# Patient Record
Sex: FEMALE | Race: WHITE | NOT HISPANIC OR LATINO | Employment: FULL TIME | URBAN - METROPOLITAN AREA
[De-identification: names, ages, dates, MRNs, and addresses within clinical notes are randomized per-mention and may not be internally consistent; named-entity substitution may affect disease eponyms.]

---

## 2017-01-09 ENCOUNTER — TELEPHONE (OUTPATIENT)
Dept: RADIOLOGY | Facility: MEDICAL CENTER | Age: 57
End: 2017-01-09

## 2017-01-18 DIAGNOSIS — G43.001 MIGRAINE WITHOUT AURA AND WITH STATUS MIGRAINOSUS, NOT INTRACTABLE: ICD-10-CM

## 2017-01-18 RX ORDER — TOPIRAMATE 25 MG/1
25 TABLET ORAL 2 TIMES DAILY
Qty: 120 TAB | Refills: 5 | Status: SHIPPED | OUTPATIENT
Start: 2017-01-18 | End: 2017-02-21 | Stop reason: SDUPTHER

## 2017-01-18 RX ORDER — TOPIRAMATE SPINKLE 25 MG/1
25 CAPSULE ORAL 2 TIMES DAILY
Qty: 120 CAP | Refills: 0 | Status: SHIPPED | OUTPATIENT
Start: 2017-01-18 | End: 2017-04-13 | Stop reason: RX

## 2017-02-21 DIAGNOSIS — G43.001 MIGRAINE WITHOUT AURA AND WITH STATUS MIGRAINOSUS, NOT INTRACTABLE: ICD-10-CM

## 2017-02-21 NOTE — TELEPHONE ENCOUNTER
Please clarify this before we send it, she needs refills and wants to know about three tabs is ok, she is still having headaches.

## 2017-02-22 RX ORDER — TOPIRAMATE 25 MG/1
25 TABLET ORAL 2 TIMES DAILY
Qty: 120 TAB | Refills: 0 | Status: SHIPPED | OUTPATIENT
Start: 2017-02-22 | End: 2017-04-13 | Stop reason: RX

## 2017-03-29 ENCOUNTER — OFFICE VISIT (OUTPATIENT)
Dept: URGENT CARE | Facility: CLINIC | Age: 57
End: 2017-03-29
Payer: COMMERCIAL

## 2017-03-29 VITALS
SYSTOLIC BLOOD PRESSURE: 134 MMHG | DIASTOLIC BLOOD PRESSURE: 90 MMHG | WEIGHT: 180 LBS | OXYGEN SATURATION: 99 % | HEART RATE: 71 BPM | HEIGHT: 66 IN | RESPIRATION RATE: 16 BRPM | BODY MASS INDEX: 28.93 KG/M2 | TEMPERATURE: 97.9 F

## 2017-03-29 DIAGNOSIS — G43.001 MIGRAINE WITHOUT AURA AND WITH STATUS MIGRAINOSUS, NOT INTRACTABLE: ICD-10-CM

## 2017-03-29 PROCEDURE — 99214 OFFICE O/P EST MOD 30 MIN: CPT | Performed by: FAMILY MEDICINE

## 2017-03-29 RX ORDER — KETOROLAC TROMETHAMINE 30 MG/ML
60 INJECTION, SOLUTION INTRAMUSCULAR; INTRAVENOUS ONCE
Status: COMPLETED | OUTPATIENT
Start: 2017-03-29 | End: 2017-03-29

## 2017-03-29 RX ORDER — DEXAMETHASONE SODIUM PHOSPHATE 10 MG/ML
10 INJECTION INTRAMUSCULAR; INTRAVENOUS ONCE
Status: COMPLETED | OUTPATIENT
Start: 2017-03-29 | End: 2017-03-29

## 2017-03-29 RX ORDER — BUTALBITAL, ACETAMINOPHEN AND CAFFEINE 50; 325; 40 MG/1; MG/1; MG/1
1 TABLET ORAL EVERY 4 HOURS PRN
Qty: 15 TAB | Refills: 0 | Status: ON HOLD | OUTPATIENT
Start: 2017-03-29 | End: 2017-09-18

## 2017-03-29 RX ADMIN — DEXAMETHASONE SODIUM PHOSPHATE 10 MG: 10 INJECTION INTRAMUSCULAR; INTRAVENOUS at 17:01

## 2017-03-29 RX ADMIN — KETOROLAC TROMETHAMINE 60 MG: 30 INJECTION, SOLUTION INTRAMUSCULAR; INTRAVENOUS at 16:58

## 2017-03-29 NOTE — MR AVS SNAPSHOT
"        Sharlene Garcia   3/29/2017 4:15 PM   Office Visit   MRN: [id number]    Department:  [facility]   Dept Phone:  [phone]    Description:  Female : 1960   Provider:  Rd Aponte M.D.           Reason for Visit     Sinus Problem pressure pain, mostly on left side, teeth hurt, eye on left pain pressure, hard to sleep at night, x5days      Allergies as of 3/29/2017     Allergen Noted Reactions    Nkda [No Known Drug Allergy] 2010       Other Environmental 2011         You were diagnosed with     Migraine without aura and with status migrainosus, not intractable   [[id number]]         Vital Signs     Blood Pressure Pulse Temperature Respirations Height Weight    134/90 mmHg 71 36.6 °C (97.9 °F) 16 1.676 m (5' 6\") 81.647 kg (180 lb)    Body Mass Index Oxygen Saturation Smoking Status             29.07 kg/m2 99% Never Smoker          Basic Information     Date Of Birth Sex Race Ethnicity Preferred Language    1960 Female White Non- English      Problem List              ICD-10-CM Priority Class Noted - Resolved    Allergic rhinitis    10/5/2010 - Present    Encounter for routine gynecological examination Z01.419   10/5/2010 - Present    Hyperlipidemia with target LDL less than 160 E78.5   10/28/2010 - Present    Elevated liver enzymes R74.8   10/28/2010 - Present    Menopausal and perimenopausal disorder N95.9   Unknown - Present    Encounter for routine gynecological examination Z01.419   10/10/2011 - Present    S/P colonoscopy Z98.890   10/10/2011 - Present    Calculus of gallbladder and bile duct with cholecystitis K80.60   10/27/2011 - Present    Migraine G43.909   10/24/2015 - Present    Cerebral aneurysm without rupture I67.1   10/25/2015 - Present      Health Maintenance        Date Due Completion Dates    IMM DTaP/Tdap/Td Vaccine (1 - Tdap) 1979 ---    PAP SMEAR 1981 ---    COLONOSCOPY 2010 ---    MAMMOGRAM 4/3/2014 4/3/2013, 3/21/2012, 3/18/2011, " 3/9/2010, 3/9/2010    IMM INFLUENZA (1) 9/1/2016 10/17/2015, 10/16/2015            Current Immunizations     Influenza Vaccine Adult HD 10/16/2015    Influenza Vaccine Quad Inj (Pf) 10/17/2015 10:37 AM    Pneumococcal polysaccharide vaccine (PPSV-23) 10/25/2012      Below and/or attached are the medications your provider expects you to take. Review all of your home medications and newly ordered medications with your provider and/or pharmacist. Follow medication instructions as directed by your provider and/or pharmacist. Please keep your medication list with you and share with your provider. Update the information when medications are discontinued, doses are changed, or new medications (including over-the-counter products) are added; and carry medication information at all times in the event of emergency situations     Allergies:  NKDA - (reactions not documented)     OTHER ENVIRONMENTAL - (reactions not documented)               Medications  Valid as of: March 29, 2017 -  5:18 PM    Generic Name Brand Name Tablet Size Instructions for use    ALPRAZolam (Tab) XANAX 0.25 MG Take 1 Tab by mouth at bedtime as needed for Sleep.        ALPRAZolam (Tab) XANAX 1 MG Take 1 Tab by mouth at bedtime as needed for Sleep. 1 Tab prior to MRI. May repeat x 1        ALPRAZolam (Tab) XANAX 1 MG Take 1 Tab by mouth at bedtime as needed for Sleep. 1 tab prior to MRI. May repeat x 1        Beclomethasone Dipropionate (Aero Soln) QVAR 80 MCG/ACT Inhale 1 Puff by mouth 2 times a day.        Butalbital-APAP-Caffeine (Tab) FIORICET -40 MG Take 1 Tab by mouth every four hours as needed for Headache.        Cetirizine HCl (Tab) ZYRTEC 10 MG Take 10 mg by mouth every day.        Coenzyme Q10-Fish Oil-Vit E   Take  by mouth.        Diphenhydramine-APAP (sleep)   Take  by mouth.        Ginkgo Biloba (Extract) Ginkgo Biloba  by Does not apply route.        hydrocodone/APAP 7.5/325 mg (NORCO) 7-325 Tab (Tab) NORCO 7-325 Take 1-2 Tabs by  mouth every four hours as needed.        Ibuprofen (Cap) Ibuprofen 200 MG Take  by mouth.        Iron   Take  by mouth.        Oxycodone-Acetaminophen (Tab) PERCOCET-10  MG         Promethazine HCl (Tab) PHENERGAN 25 MG Take 1 Tab by mouth every 6 hours as needed for Nausea/Vomiting.        SUMAtriptan Succinate (Tab) IMITREX 100 MG Take 1 Tab by mouth Once PRN for Migraine for up to 1 dose.        Topiramate (Tab) TOPAMAX 25 MG Take 1 Tab by mouth 2 times a day.        Topiramate (CAPSULE SPRINKLE) TOPAMAX 25 MG Take 1 Cap by mouth 2 times a day. 2 tabs AM and 2 tabs PM        Topiramate (Tab) TOPAMAX 25 MG Take 1 Tab by mouth 2 times a day. 2  25 mg. Tabs BID        .                 Medicines prescribed today were sent to:     [facility][id number] - [place], [place] - [address]    [address] [place] [address]    Phone: [phone] Fax: [phone]    Open 24 Hours?: No      Medication refill instructions:       If your prescription bottle indicates you have medication refills left, it is not necessary to call your provider’s office. Please contact your pharmacy and they will refill your medication.    If your prescription bottle indicates you do not have any refills left, you may request refills at any time through one of the following ways: The online [facility] system (except Urgent Care), by calling your provider’s office, or by asking your pharmacy to contact your provider’s office with a refill request. Medication refills are processed only during regular business hours and may not be available until the next business day. Your provider may request additional information or to have a follow-up visit with you prior to refilling your medication.   *Please Note: Medication refills are assigned a new Rx number when refilled electronically. Your pharmacy may indicate that no refills were authorized even though a new prescription for the same medication is available at the pharmacy. Please request the  medicine by name with the pharmacy before contacting your provider for a refill.           MyChart Access Code: Activation code not generated  Current MyChart Status: Active

## 2017-03-29 NOTE — PROGRESS NOTES
Chief Complaint   Patient presents with   •  headache     pressure pain, mostly on left side, teeth hurt, eye on left pain pressure, hard to sleep at night, x5days         Migraine   This is a new problem. The current episode started in the past 7 days. The problem occurs intermittently. The problem has been unchanged. The pain is located in the retro-orbital and left unilateral region. The pain does not radiate. The pain quality is similar to prior headaches. The quality of the pain is described as sharp. Associated symptoms include dizziness, nausea, phonophobia and photophobia. Pertinent negatives include no abdominal pain or fever. The symptoms are aggravated by activity and bright light. Patient has tried acetaminophen for the symptoms. The treatment provided no relief.  past medical history is significant for migraine headaches.       Social History   Substance Use Topics   • Smoking status: Never Smoker    • Smokeless tobacco: Never Used   • Alcohol Use: 0.0 - 0.5 oz/week     0-1 Glasses of wine per week      Comment: once a week           Past Medical History   Diagnosis Date   • ASTHMA    • Allergy    • Anxiety      Due to financial issues, never took medication.   • GERD (gastroesophageal reflux disease) 2005     Doing better now with weight loss 6/10.   • Chronic bronchitis      Including multiple episodes of pnemonia, until 2001   • Headache(784.0)    • Migraine      Rare, many years ago.   • Hyperlipidemia LDL goal < 160 10/28/2010   • Menopausal and perimenopausal disorder 2005           Review of Systems   Constitutional: Negative for fever and chills.   Eyes: Positive for photophobia.   Respiratory: Negative for shortness of breath.    Cardiovascular: Negative for chest pain and palpitations.   Gastrointestinal: Positive for nausea. Negative for abdominal pain.   Skin: Negative for rash.   Neurological: Positive for dizziness and headaches.   Psychiatric/Behavioral: The patient is not  "nervous/anxious.    All other systems reviewed and are negative.         Objective:     Blood pressure 134/90, pulse 71, temperature 36.6 °C (97.9 °F), resp. rate 16, height 1.676 m (5' 6\"), weight 81.647 kg (180 lb), SpO2 99 %.    Physical Exam   Constitutional: pt is oriented to person, place, and time.  Pt appears well-developed and well-nourished. No distress.   HENT:   Head: Normocephalic and atraumatic.   Mouth/Throat: Oropharynx is clear and moist. No oropharyngeal exudate.   Eyes: Conjunctivae and EOM are normal. Pupils are equal, round, and reactive to light. Right eye exhibits no discharge. Left eye exhibits no discharge. No scleral icterus.   Neck: Neck supple.   Cardiovascular: Normal rate and regular rhythm.    Pulmonary/Chest: Effort normal.   Lymphadenopathy:     Pt has no cervical adenopathy.   Neurological: pt is alert and oriented to person, place, and time. No cranial nerve deficit. Coordination normal.   Skin: Skin is warm. Pt is not diaphoretic. No erythema. No pallor.   Psychiatric:  behavior is normal.   Nursing note and vitals reviewed.              Assessment/Plan:     1. Migraine without aura and with status migrainosus, not intractable  Subjective improvement after toradol, decadron injection    - ketorolac (TORADOL) injection 60 mg; 2 mL by Intramuscular route Once.  - dexamethasone (DECADRON) injection (check route below) 10 mg; 1 mL by Intramuscular route Once.  - acetaminophen/caffeine/butalbital 325-40-50 mg (FIORICET) -40 MG Tab; Take 1 Tab by mouth every four hours as needed for Headache.  Dispense: 15 Tab; Refill: 0        "

## 2017-04-13 DIAGNOSIS — G43.109 MIGRAINE WITH AURA AND WITHOUT STATUS MIGRAINOSUS, NOT INTRACTABLE: ICD-10-CM

## 2017-04-13 RX ORDER — TOPIRAMATE 25 MG/1
TABLET ORAL
Qty: 120 TAB | Refills: 5 | Status: ON HOLD | OUTPATIENT
Start: 2017-04-13 | End: 2017-09-18

## 2017-06-09 DIAGNOSIS — G43.001 MIGRAINE WITHOUT AURA AND WITH STATUS MIGRAINOSUS, NOT INTRACTABLE: ICD-10-CM

## 2017-06-09 NOTE — TELEPHONE ENCOUNTER
Pt requesting a refill on Alprazolam, rx was last written for on 12/21/16 with 2 additional refills. Pt was seen on 12/1/16, and does not have a f/v scheduled.

## 2017-06-14 DIAGNOSIS — G43.001 MIGRAINE WITHOUT AURA AND WITH STATUS MIGRAINOSUS, NOT INTRACTABLE: ICD-10-CM

## 2017-06-14 RX ORDER — ALPRAZOLAM 0.25 MG/1
0.25 TABLET ORAL NIGHTLY PRN
Qty: 30 TAB | Refills: 2 | Status: ON HOLD | OUTPATIENT
Start: 2017-06-14 | End: 2017-09-19

## 2017-06-14 RX ORDER — ALPRAZOLAM 0.25 MG/1
0.25 TABLET ORAL NIGHTLY PRN
Qty: 30 TAB | Refills: 5 | Status: ON HOLD | OUTPATIENT
Start: 2017-06-14 | End: 2017-09-18

## 2017-09-17 ENCOUNTER — APPOINTMENT (OUTPATIENT)
Dept: RADIOLOGY | Facility: MEDICAL CENTER | Age: 57
End: 2017-09-17
Attending: EMERGENCY MEDICINE
Payer: COMMERCIAL

## 2017-09-17 ENCOUNTER — RESOLUTE PROFESSIONAL BILLING HOSPITAL PROF FEE (OUTPATIENT)
Dept: HOSPITALIST | Facility: MEDICAL CENTER | Age: 57
End: 2017-09-17
Payer: COMMERCIAL

## 2017-09-17 ENCOUNTER — HOSPITAL ENCOUNTER (OUTPATIENT)
Facility: MEDICAL CENTER | Age: 57
End: 2017-09-19
Attending: EMERGENCY MEDICINE | Admitting: HOSPITALIST
Payer: COMMERCIAL

## 2017-09-17 DIAGNOSIS — R51.9 ACUTE INTRACTABLE HEADACHE, UNSPECIFIED HEADACHE TYPE: ICD-10-CM

## 2017-09-17 DIAGNOSIS — G43.001 MIGRAINE WITHOUT AURA AND WITH STATUS MIGRAINOSUS, NOT INTRACTABLE: ICD-10-CM

## 2017-09-17 PROBLEM — E87.6 HYPOKALEMIA: Status: ACTIVE | Noted: 2017-09-17

## 2017-09-17 PROBLEM — D72.819 LEUKOPENIA: Status: ACTIVE | Noted: 2017-09-17

## 2017-09-17 PROBLEM — G43.909 MIGRAINE HEADACHE: Status: ACTIVE | Noted: 2017-09-17

## 2017-09-17 LAB
ALBUMIN SERPL BCP-MCNC: 4.3 G/DL (ref 3.2–4.9)
ALBUMIN/GLOB SERPL: 1.8 G/DL
ALP SERPL-CCNC: 96 U/L (ref 30–99)
ALT SERPL-CCNC: 124 U/L (ref 2–50)
ANION GAP SERPL CALC-SCNC: 9 MMOL/L (ref 0–11.9)
AST SERPL-CCNC: 76 U/L (ref 12–45)
BASOPHILS # BLD AUTO: 1.4 % (ref 0–1.8)
BASOPHILS # BLD: 0.05 K/UL (ref 0–0.12)
BILIRUB SERPL-MCNC: 0.6 MG/DL (ref 0.1–1.5)
BUN SERPL-MCNC: 10 MG/DL (ref 8–22)
CALCIUM SERPL-MCNC: 8.8 MG/DL (ref 8.4–10.2)
CHLORIDE SERPL-SCNC: 109 MMOL/L (ref 96–112)
CO2 SERPL-SCNC: 19 MMOL/L (ref 20–33)
CREAT SERPL-MCNC: 0.83 MG/DL (ref 0.5–1.4)
EOSINOPHIL # BLD AUTO: 0.07 K/UL (ref 0–0.51)
EOSINOPHIL NFR BLD: 2 % (ref 0–6.9)
ERYTHROCYTE [DISTWIDTH] IN BLOOD BY AUTOMATED COUNT: 45.1 FL (ref 35.9–50)
GFR SERPL CREATININE-BSD FRML MDRD: >60 ML/MIN/1.73 M 2
GLOBULIN SER CALC-MCNC: 2.4 G/DL (ref 1.9–3.5)
GLUCOSE SERPL-MCNC: 96 MG/DL (ref 65–99)
HCT VFR BLD AUTO: 38.7 % (ref 37–47)
HGB BLD-MCNC: 13.3 G/DL (ref 12–16)
IMM GRANULOCYTES # BLD AUTO: 0.01 K/UL (ref 0–0.11)
IMM GRANULOCYTES NFR BLD AUTO: 0.3 % (ref 0–0.9)
LYMPHOCYTES # BLD AUTO: 1.57 K/UL (ref 1–4.8)
LYMPHOCYTES NFR BLD: 44.1 % (ref 22–41)
MCH RBC QN AUTO: 30.7 PG (ref 27–33)
MCHC RBC AUTO-ENTMCNC: 34.4 G/DL (ref 33.6–35)
MCV RBC AUTO: 89.4 FL (ref 81.4–97.8)
MONOCYTES # BLD AUTO: 0.34 K/UL (ref 0–0.85)
MONOCYTES NFR BLD AUTO: 9.6 % (ref 0–13.4)
NEUTROPHILS # BLD AUTO: 1.52 K/UL (ref 2–7.15)
NEUTROPHILS NFR BLD: 42.6 % (ref 44–72)
NRBC # BLD AUTO: 0 K/UL
NRBC BLD AUTO-RTO: 0 /100 WBC
PLATELET # BLD AUTO: 257 K/UL (ref 164–446)
PMV BLD AUTO: 11.4 FL (ref 9–12.9)
POTASSIUM SERPL-SCNC: 3.5 MMOL/L (ref 3.6–5.5)
PROT SERPL-MCNC: 6.7 G/DL (ref 6–8.2)
RBC # BLD AUTO: 4.33 M/UL (ref 4.2–5.4)
SODIUM SERPL-SCNC: 137 MMOL/L (ref 135–145)
WBC # BLD AUTO: 3.6 K/UL (ref 4.8–10.8)

## 2017-09-17 PROCEDURE — 700105 HCHG RX REV CODE 258: Performed by: EMERGENCY MEDICINE

## 2017-09-17 PROCEDURE — G0378 HOSPITAL OBSERVATION PER HR: HCPCS

## 2017-09-17 PROCEDURE — 99285 EMERGENCY DEPT VISIT HI MDM: CPT

## 2017-09-17 PROCEDURE — 96374 THER/PROPH/DIAG INJ IV PUSH: CPT

## 2017-09-17 PROCEDURE — 700102 HCHG RX REV CODE 250 W/ 637 OVERRIDE(OP): Performed by: HOSPITALIST

## 2017-09-17 PROCEDURE — 96375 TX/PRO/DX INJ NEW DRUG ADDON: CPT

## 2017-09-17 PROCEDURE — 70450 CT HEAD/BRAIN W/O DYE: CPT

## 2017-09-17 PROCEDURE — 700111 HCHG RX REV CODE 636 W/ 250 OVERRIDE (IP): Performed by: EMERGENCY MEDICINE

## 2017-09-17 PROCEDURE — 36415 COLL VENOUS BLD VENIPUNCTURE: CPT

## 2017-09-17 PROCEDURE — 700105 HCHG RX REV CODE 258: Performed by: HOSPITALIST

## 2017-09-17 PROCEDURE — 94760 N-INVAS EAR/PLS OXIMETRY 1: CPT

## 2017-09-17 PROCEDURE — A9270 NON-COVERED ITEM OR SERVICE: HCPCS | Performed by: HOSPITALIST

## 2017-09-17 PROCEDURE — 99220 PR INITIAL OBSERVATION CARE,LEVL III: CPT | Performed by: HOSPITALIST

## 2017-09-17 PROCEDURE — 85025 COMPLETE CBC W/AUTO DIFF WBC: CPT

## 2017-09-17 PROCEDURE — 80053 COMPREHEN METABOLIC PANEL: CPT

## 2017-09-17 RX ORDER — PROMETHAZINE HYDROCHLORIDE 25 MG/1
12.5-25 SUPPOSITORY RECTAL EVERY 4 HOURS PRN
Status: DISCONTINUED | OUTPATIENT
Start: 2017-09-17 | End: 2017-09-19 | Stop reason: HOSPADM

## 2017-09-17 RX ORDER — POTASSIUM CHLORIDE 20 MEQ/1
20 TABLET, EXTENDED RELEASE ORAL DAILY
Status: DISCONTINUED | OUTPATIENT
Start: 2017-09-18 | End: 2017-09-19 | Stop reason: HOSPADM

## 2017-09-17 RX ORDER — POLYETHYLENE GLYCOL 3350 17 G/17G
1 POWDER, FOR SOLUTION ORAL
Status: DISCONTINUED | OUTPATIENT
Start: 2017-09-17 | End: 2017-09-19 | Stop reason: HOSPADM

## 2017-09-17 RX ORDER — BUTALBITAL, ACETAMINOPHEN AND CAFFEINE 50; 325; 40 MG/1; MG/1; MG/1
1 TABLET ORAL EVERY 4 HOURS PRN
Status: DISCONTINUED | OUTPATIENT
Start: 2017-09-17 | End: 2017-09-19 | Stop reason: HOSPADM

## 2017-09-17 RX ORDER — KETOROLAC TROMETHAMINE 30 MG/ML
30 INJECTION, SOLUTION INTRAMUSCULAR; INTRAVENOUS ONCE
Status: COMPLETED | OUTPATIENT
Start: 2017-09-17 | End: 2017-09-17

## 2017-09-17 RX ORDER — DIPHENHYDRAMINE HCL 25 MG
25 TABLET ORAL
Status: DISCONTINUED | OUTPATIENT
Start: 2017-09-17 | End: 2017-09-19 | Stop reason: HOSPADM

## 2017-09-17 RX ORDER — ALPRAZOLAM 0.5 MG/1
1 TABLET ORAL NIGHTLY PRN
Status: DISCONTINUED | OUTPATIENT
Start: 2017-09-17 | End: 2017-09-19 | Stop reason: HOSPADM

## 2017-09-17 RX ORDER — DIPHENHYDRAMINE HYDROCHLORIDE 50 MG/ML
25 INJECTION INTRAMUSCULAR; INTRAVENOUS ONCE
Status: COMPLETED | OUTPATIENT
Start: 2017-09-17 | End: 2017-09-17

## 2017-09-17 RX ORDER — ONDANSETRON 4 MG/1
4 TABLET, ORALLY DISINTEGRATING ORAL EVERY 4 HOURS PRN
Status: DISCONTINUED | OUTPATIENT
Start: 2017-09-17 | End: 2017-09-19 | Stop reason: HOSPADM

## 2017-09-17 RX ORDER — ATORVASTATIN CALCIUM 10 MG/1
20 TABLET, FILM COATED ORAL
Status: DISCONTINUED | OUTPATIENT
Start: 2017-09-17 | End: 2017-09-19 | Stop reason: HOSPADM

## 2017-09-17 RX ORDER — PROMETHAZINE HYDROCHLORIDE 25 MG/1
12.5-25 TABLET ORAL EVERY 4 HOURS PRN
Status: DISCONTINUED | OUTPATIENT
Start: 2017-09-17 | End: 2017-09-19 | Stop reason: HOSPADM

## 2017-09-17 RX ORDER — ONDANSETRON 2 MG/ML
4 INJECTION INTRAMUSCULAR; INTRAVENOUS EVERY 4 HOURS PRN
Status: DISCONTINUED | OUTPATIENT
Start: 2017-09-17 | End: 2017-09-19 | Stop reason: HOSPADM

## 2017-09-17 RX ORDER — SODIUM CHLORIDE 9 MG/ML
1000 INJECTION, SOLUTION INTRAVENOUS ONCE
Status: COMPLETED | OUTPATIENT
Start: 2017-09-17 | End: 2017-09-17

## 2017-09-17 RX ORDER — DIPHENHYDRAMINE HYDROCHLORIDE 50 MG/ML
12.5 INJECTION INTRAMUSCULAR; INTRAVENOUS ONCE
Status: DISCONTINUED | OUTPATIENT
Start: 2017-09-17 | End: 2017-09-17

## 2017-09-17 RX ORDER — AMOXICILLIN 250 MG
2 CAPSULE ORAL 2 TIMES DAILY
Status: DISCONTINUED | OUTPATIENT
Start: 2017-09-17 | End: 2017-09-19 | Stop reason: HOSPADM

## 2017-09-17 RX ORDER — LABETALOL HYDROCHLORIDE 5 MG/ML
10 INJECTION, SOLUTION INTRAVENOUS EVERY 4 HOURS PRN
Status: DISCONTINUED | OUTPATIENT
Start: 2017-09-17 | End: 2017-09-19 | Stop reason: HOSPADM

## 2017-09-17 RX ORDER — SODIUM CHLORIDE 9 MG/ML
INJECTION, SOLUTION INTRAVENOUS CONTINUOUS
Status: ACTIVE | OUTPATIENT
Start: 2017-09-17 | End: 2017-09-18

## 2017-09-17 RX ORDER — BISACODYL 10 MG
10 SUPPOSITORY, RECTAL RECTAL
Status: DISCONTINUED | OUTPATIENT
Start: 2017-09-17 | End: 2017-09-19 | Stop reason: HOSPADM

## 2017-09-17 RX ORDER — TOPIRAMATE 25 MG/1
25 TABLET ORAL 2 TIMES DAILY
Status: DISCONTINUED | OUTPATIENT
Start: 2017-09-17 | End: 2017-09-19

## 2017-09-17 RX ORDER — HYDROCODONE BITARTRATE AND ACETAMINOPHEN 7.5; 325 MG/1; MG/1
1-2 TABLET ORAL EVERY 4 HOURS PRN
Status: DISCONTINUED | OUTPATIENT
Start: 2017-09-17 | End: 2017-09-19 | Stop reason: HOSPADM

## 2017-09-17 RX ORDER — CETIRIZINE HYDROCHLORIDE 10 MG/1
10 TABLET ORAL DAILY
Status: DISCONTINUED | OUTPATIENT
Start: 2017-09-18 | End: 2017-09-19 | Stop reason: HOSPADM

## 2017-09-17 RX ORDER — ONDANSETRON 2 MG/ML
4 INJECTION INTRAMUSCULAR; INTRAVENOUS ONCE
Status: COMPLETED | OUTPATIENT
Start: 2017-09-17 | End: 2017-09-17

## 2017-09-17 RX ORDER — IBUPROFEN 200 MG
800 TABLET ORAL 3 TIMES DAILY PRN
Status: DISCONTINUED | OUTPATIENT
Start: 2017-09-17 | End: 2017-09-18

## 2017-09-17 RX ADMIN — TOPIRAMATE 25 MG: 25 TABLET, FILM COATED ORAL at 23:45

## 2017-09-17 RX ADMIN — KETOROLAC TROMETHAMINE 30 MG: 30 INJECTION, SOLUTION INTRAMUSCULAR at 18:52

## 2017-09-17 RX ADMIN — FENTANYL CITRATE 50 MCG: 50 INJECTION, SOLUTION INTRAMUSCULAR; INTRAVENOUS at 18:03

## 2017-09-17 RX ADMIN — ONDANSETRON 4 MG: 2 INJECTION INTRAMUSCULAR; INTRAVENOUS at 18:02

## 2017-09-17 RX ADMIN — DIPHENHYDRAMINE HYDROCHLORIDE 25 MG: 50 INJECTION, SOLUTION INTRAMUSCULAR; INTRAVENOUS at 19:38

## 2017-09-17 RX ADMIN — ATORVASTATIN CALCIUM 20 MG: 40 TABLET, FILM COATED ORAL at 23:46

## 2017-09-17 RX ADMIN — SODIUM CHLORIDE 1000 ML: 9 INJECTION, SOLUTION INTRAVENOUS at 18:03

## 2017-09-17 RX ADMIN — SODIUM CHLORIDE: 9 INJECTION, SOLUTION INTRAVENOUS at 23:44

## 2017-09-17 ASSESSMENT — COPD QUESTIONNAIRES
DO YOU EVER COUGH UP ANY MUCUS OR PHLEGM?: NO/ONLY WITH OCCASIONAL COLDS OR INFECTIONS
COPD SCREENING SCORE: 1
DURING THE PAST 4 WEEKS HOW MUCH DID YOU FEEL SHORT OF BREATH: NONE/LITTLE OF THE TIME
HAVE YOU SMOKED AT LEAST 100 CIGARETTES IN YOUR ENTIRE LIFE: NO/DON'T KNOW

## 2017-09-17 ASSESSMENT — PAIN SCALES - GENERAL
PAINLEVEL_OUTOF10: 7
PAINLEVEL_OUTOF10: 8
PAINLEVEL_OUTOF10: 6

## 2017-09-17 ASSESSMENT — LIFESTYLE VARIABLES
ALCOHOL_USE: NO
EVER_SMOKED: NEVER

## 2017-09-18 ENCOUNTER — APPOINTMENT (OUTPATIENT)
Dept: RADIOLOGY | Facility: MEDICAL CENTER | Age: 57
End: 2017-09-18
Attending: HOSPITALIST
Payer: COMMERCIAL

## 2017-09-18 LAB
ANION GAP SERPL CALC-SCNC: 4 MMOL/L (ref 0–11.9)
BUN SERPL-MCNC: 12 MG/DL (ref 8–22)
CALCIUM SERPL-MCNC: 7.9 MG/DL (ref 8.4–10.2)
CHLORIDE SERPL-SCNC: 114 MMOL/L (ref 96–112)
CO2 SERPL-SCNC: 21 MMOL/L (ref 20–33)
CREAT SERPL-MCNC: 0.77 MG/DL (ref 0.5–1.4)
ERYTHROCYTE [DISTWIDTH] IN BLOOD BY AUTOMATED COUNT: 48 FL (ref 35.9–50)
GFR SERPL CREATININE-BSD FRML MDRD: >60 ML/MIN/1.73 M 2
GLUCOSE SERPL-MCNC: 104 MG/DL (ref 65–99)
HCT VFR BLD AUTO: 36.4 % (ref 37–47)
HGB BLD-MCNC: 11.9 G/DL (ref 12–16)
MCH RBC QN AUTO: 30.3 PG (ref 27–33)
MCHC RBC AUTO-ENTMCNC: 32.7 G/DL (ref 33.6–35)
MCV RBC AUTO: 92.6 FL (ref 81.4–97.8)
PLATELET # BLD AUTO: 232 K/UL (ref 164–446)
PMV BLD AUTO: 11.6 FL (ref 9–12.9)
POTASSIUM SERPL-SCNC: 3.5 MMOL/L (ref 3.6–5.5)
RBC # BLD AUTO: 3.93 M/UL (ref 4.2–5.4)
SODIUM SERPL-SCNC: 139 MMOL/L (ref 135–145)
WBC # BLD AUTO: 3.2 K/UL (ref 4.8–10.8)

## 2017-09-18 PROCEDURE — 70553 MRI BRAIN STEM W/O & W/DYE: CPT

## 2017-09-18 PROCEDURE — 700111 HCHG RX REV CODE 636 W/ 250 OVERRIDE (IP): Performed by: HOSPITALIST

## 2017-09-18 PROCEDURE — 85027 COMPLETE CBC AUTOMATED: CPT

## 2017-09-18 PROCEDURE — 99226 PR SUBSEQUENT OBSERVATION CARE,LEVEL III: CPT | Performed by: INTERNAL MEDICINE

## 2017-09-18 PROCEDURE — A9579 GAD-BASE MR CONTRAST NOS,1ML: HCPCS | Performed by: INTERNAL MEDICINE

## 2017-09-18 PROCEDURE — 700111 HCHG RX REV CODE 636 W/ 250 OVERRIDE (IP): Performed by: INTERNAL MEDICINE

## 2017-09-18 PROCEDURE — 90471 IMMUNIZATION ADMIN: CPT

## 2017-09-18 PROCEDURE — G0378 HOSPITAL OBSERVATION PER HR: HCPCS

## 2017-09-18 PROCEDURE — 90686 IIV4 VACC NO PRSV 0.5 ML IM: CPT | Performed by: HOSPITALIST

## 2017-09-18 PROCEDURE — 80048 BASIC METABOLIC PNL TOTAL CA: CPT

## 2017-09-18 PROCEDURE — 700102 HCHG RX REV CODE 250 W/ 637 OVERRIDE(OP): Performed by: INTERNAL MEDICINE

## 2017-09-18 PROCEDURE — A9270 NON-COVERED ITEM OR SERVICE: HCPCS | Performed by: INTERNAL MEDICINE

## 2017-09-18 PROCEDURE — 700102 HCHG RX REV CODE 250 W/ 637 OVERRIDE(OP): Performed by: HOSPITALIST

## 2017-09-18 PROCEDURE — A9270 NON-COVERED ITEM OR SERVICE: HCPCS | Performed by: HOSPITALIST

## 2017-09-18 PROCEDURE — 700117 HCHG RX CONTRAST REV CODE 255: Performed by: INTERNAL MEDICINE

## 2017-09-18 RX ORDER — NAPROXEN 250 MG/1
500 TABLET ORAL 2 TIMES DAILY
Status: DISCONTINUED | OUTPATIENT
Start: 2017-09-18 | End: 2017-09-19 | Stop reason: HOSPADM

## 2017-09-18 RX ORDER — TOPIRAMATE 25 MG/1
25 TABLET ORAL 2 TIMES DAILY
Status: ON HOLD | COMMUNITY
End: 2017-09-19

## 2017-09-18 RX ORDER — FLUTICASONE PROPIONATE 50 MCG
2 SPRAY, SUSPENSION (ML) NASAL DAILY
Status: DISCONTINUED | OUTPATIENT
Start: 2017-09-18 | End: 2017-09-19 | Stop reason: HOSPADM

## 2017-09-18 RX ORDER — BENZONATATE 100 MG/1
100 CAPSULE ORAL 3 TIMES DAILY PRN
Status: DISCONTINUED | OUTPATIENT
Start: 2017-09-18 | End: 2017-09-19 | Stop reason: HOSPADM

## 2017-09-18 RX ORDER — ALPRAZOLAM 0.5 MG/1
2 TABLET ORAL
Status: COMPLETED | OUTPATIENT
Start: 2017-09-18 | End: 2017-09-18

## 2017-09-18 RX ORDER — SUMATRIPTAN 6 MG/.5ML
6 INJECTION, SOLUTION SUBCUTANEOUS ONCE
Status: COMPLETED | OUTPATIENT
Start: 2017-09-18 | End: 2017-09-18

## 2017-09-18 RX ADMIN — NAPROXEN 500 MG: 250 TABLET ORAL at 21:36

## 2017-09-18 RX ADMIN — BENZONATATE 100 MG: 100 CAPSULE ORAL at 13:18

## 2017-09-18 RX ADMIN — POTASSIUM CHLORIDE 20 MEQ: 1500 TABLET, EXTENDED RELEASE ORAL at 09:59

## 2017-09-18 RX ADMIN — CETIRIZINE HYDROCHLORIDE 10 MG: 10 TABLET, FILM COATED ORAL at 09:59

## 2017-09-18 RX ADMIN — ALPRAZOLAM 1 MG: 0.5 TABLET ORAL at 00:04

## 2017-09-18 RX ADMIN — TOPIRAMATE 25 MG: 25 TABLET, FILM COATED ORAL at 21:37

## 2017-09-18 RX ADMIN — ATORVASTATIN CALCIUM 20 MG: 40 TABLET, FILM COATED ORAL at 21:36

## 2017-09-18 RX ADMIN — ALPRAZOLAM 2 MG: 0.5 TABLET ORAL at 16:09

## 2017-09-18 RX ADMIN — ENOXAPARIN SODIUM 40 MG: 100 INJECTION SUBCUTANEOUS at 09:58

## 2017-09-18 RX ADMIN — GUAIFENESIN 200 MG: 100 SOLUTION ORAL at 09:56

## 2017-09-18 RX ADMIN — TOPIRAMATE 25 MG: 25 TABLET, FILM COATED ORAL at 09:59

## 2017-09-18 RX ADMIN — BUTALBITAL, ACETAMINOPHEN, AND CAFFEINE 1 TABLET: 50; 325; 40 TABLET ORAL at 13:18

## 2017-09-18 RX ADMIN — NAPROXEN 500 MG: 250 TABLET ORAL at 09:27

## 2017-09-18 RX ADMIN — SUMATRIPTAN SUCCINATE 6 MG: 6 INJECTION, SOLUTION SUBCUTANEOUS at 09:33

## 2017-09-18 RX ADMIN — DOCUSATE SODIUM AND SENNOSIDES 2 TABLET: 8.6; 5 TABLET, FILM COATED ORAL at 21:36

## 2017-09-18 RX ADMIN — GADODIAMIDE 20 ML: 287 INJECTION INTRAVENOUS at 17:14

## 2017-09-18 RX ADMIN — GUAIFENESIN 200 MG: 100 SOLUTION ORAL at 16:15

## 2017-09-18 RX ADMIN — INFLUENZA A VIRUS A/MICHIGAN/45/2015 X-275 (H1N1) ANTIGEN (FORMALDEHYDE INACTIVATED), INFLUENZA A VIRUS A/HONG KONG/4801/2014 X-263B (H3N2) ANTIGEN (FORMALDEHYDE INACTIVATED), INFLUENZA B VIRUS B/PHUKET/3073/2013 ANTIGEN (FORMALDEHYDE INACTIVATED), AND INFLUENZA B VIRUS B/BRISBANE/60/2008 ANTIGEN (FORMALDEHYDE INACTIVATED) 0.5 ML: 15; 15; 15; 15 INJECTION, SUSPENSION INTRAMUSCULAR at 00:04

## 2017-09-18 ASSESSMENT — PAIN SCALES - GENERAL
PAINLEVEL_OUTOF10: 10
PAINLEVEL_OUTOF10: 3

## 2017-09-18 ASSESSMENT — ENCOUNTER SYMPTOMS
COUGH: 1
HEADACHES: 1

## 2017-09-18 NOTE — ASSESSMENT & PLAN NOTE
- noted on MRI brain (old); repeat pending  - CT head negative for bleeding  - IR rec'd not amenable to coiling

## 2017-09-18 NOTE — PROGRESS NOTES
2 RN skin assessment done; skin WDL except for scratches on right forearm pt states are a scrape from her dog.

## 2017-09-18 NOTE — ED NOTES
"Chief Complaint   Patient presents with   • Migraine     Pt BIB family c/o severe headache with hx of migrains. Pt states this headache has gotten progressivley worse since it began at apprx 0300 this am. Pt states \"hx of aneurysm that has't been checked since December\"     /89   Pulse 71   Temp 36.9 °C (98.4 °F)   Resp 18   Ht 1.676 m (5' 6\")   Wt 85.5 kg (188 lb 7.9 oz)   SpO2 96%   BMI 30.42 kg/m²     "

## 2017-09-18 NOTE — ED PROVIDER NOTES
"ED Provider Note    CHIEF COMPLAINT  Chief Complaint   Patient presents with   • Migraine     Pt BIB family c/o severe headache with hx of migrains. Pt states this headache has gotten progressivley worse since it began at apprx 0300 this am. Pt states \"hx of aneurysm that has't been checked since December\"       HPI  Sharlene Garcia is a 57 y.o. female who presentsFor evaluation of a headache. Patient states she has a history of migraine headaches and also an aneurysm that is being surveilled yearly with MRI. Today she presents with a headache that she feels is similar to her previous migraines. She states she has a history of Chiari syndrome. The patient states he generally Toradol is effective for her headache she has some nausea. She's had no weakness of her upper or lower extremities. She states this headache is more severe than her usual migraine. She denies any stiff neck fever or chills. Her past medical history is reviewed and she's had an aneurysm repair in the past    REVIEW OF SYSTEMS  See HPI for further details. All other systems are reviewed and negative except as noted above    PAST MEDICAL HISTORY  Past Medical History:   Diagnosis Date   • Hyperlipidemia LDL goal < 160 10/28/2010   • GERD (gastroesophageal reflux disease)     Doing better now with weight loss 6/10.   • Menopausal and perimenopausal disorder    • Allergy    • Anxiety     Due to financial issues, never took medication.   • ASTHMA    • Chronic bronchitis     Including multiple episodes of pnemonia, until    • Headache    • Migraine     Rare, many years ago.       FAMILY HISTORY  Family History   Problem Relation Age of Onset   • Cancer Mother 53     Ovarian  at 57 from it   • Diabetes Father        SOCIAL HISTORY  Social History     Social History   • Marital status:      Spouse name: N/A   • Number of children: N/A   • Years of education: N/A     Social History Main Topics   • Smoking status: Never Smoker   • " "Smokeless tobacco: Never Used   • Alcohol use 0.0 - 0.5 oz/week      Comment: once a week   • Drug use: No   • Sexual activity: Yes     Partners: Male     Birth control/ protection: Post-Menopausal      Comment: menopausal since      Other Topics Concern   • Not on file     Social History Narrative   • No narrative on file       SURGICAL HISTORY  Past Surgical History:   Procedure Laterality Date   • RECOVERY  10/25/2015    Procedure: [facility] RECOVERY ONLY;  Surgeon: Ir-Recovery Surgery;  Location: SURGERY [facility] ORS;  Service:    • CRISTINA BY LAPAROSCOPY  2011    Performed by LUIS ALBERTO ROBLES at SURGERY [facility]   • HYSTEROSCOPY WITH VIDEO OPERATIVE  2010    Performed by PETER MORALES at SURGERY [facility] ORS   • PB  DELIVERY ONLY     • PRIMARY C SECTION     • MAMMOPLASTY AUGMENTATION      And removal calcium deposits   • MN ORAL SURGERY PROCEDURE      wisdom teeth extraction   • BUNIONECTOMY  1978/1984    x2   • PB ENLARGE BREAST WITH IMPLANT         CURRENT MEDICATIONS  Home Medications    **Home medications have not yet been reviewed for this encounter**          ALLERGIES  Allergies   Allergen Reactions   • Nkda [No Known Drug Allergy]    • Other Environmental        PHYSICAL EXAM  VITAL SIGNS: /89   Pulse 71   Temp 36.9 °C (98.4 °F)   Resp 18   Ht 1.676 m (5' 6\")   Wt 85.5 kg (188 lb 7.9 oz)   SpO2 96%   BMI 30.42 kg/m²   Constitutional :  Well developed, Well nourished,Appears to be in moderate pain Non-toxic appearance.   HENT: Head is atraumatic normocephalic, slightly dry mucous membranes  Eyes: Normal-appearing nonicteric  Neck: Normal range of motion, No tenderness, Supple, No stridor.   Lymphatic: No cervical adenopathy.   Cardiovascular: Normal heart rate, Normal rhythm, No murmurs, No rubs, No gallops.   Thorax & Lungs: Equal breath sounds bilaterally no rales rhonchi  Skin: Warm, Dry, No erythema, No rash.   Abdomen is soft " nontender no distention  Neurological the patient is awake alert without focal neurological findings  Extremities no cyanosis or edema        CT-HEAD W/O   Final Result         1.  NO ACUTE ABNORMALITIES ARE NOTED ON CT SCAN OF THE HEAD.      2.  Status post left ICA aneurysm repair.      3.  Chiari I malformation again identified.             Results for orders placed or performed during the hospital encounter of 09/17/17   CBC WITH DIFFERENTIAL   Result Value Ref Range    WBC 3.6 (L) 4.8 - 10.8 K/uL    RBC 4.33 4.20 - 5.40 M/uL    Hemoglobin 13.3 12.0 - 16.0 g/dL    Hematocrit 38.7 37.0 - 47.0 %    MCV 89.4 81.4 - 97.8 fL    MCH 30.7 27.0 - 33.0 pg    MCHC 34.4 33.6 - 35.0 g/dL    RDW 45.1 35.9 - 50.0 fL    Platelet Count 257 164 - 446 K/uL    MPV 11.4 9.0 - 12.9 fL    Neutrophils-Polys 42.60 (L) 44.00 - 72.00 %    Lymphocytes 44.10 (H) 22.00 - 41.00 %    Monocytes 9.60 0.00 - 13.40 %    Eosinophils 2.00 0.00 - 6.90 %    Basophils 1.40 0.00 - 1.80 %    Immature Granulocytes 0.30 0.00 - 0.90 %    Nucleated RBC 0.00 /100 WBC    Neutrophils (Absolute) 1.52 (L) 2.00 - 7.15 K/uL    Lymphs (Absolute) 1.57 1.00 - 4.80 K/uL    Monos (Absolute) 0.34 0.00 - 0.85 K/uL    Eos (Absolute) 0.07 0.00 - 0.51 K/uL    Baso (Absolute) 0.05 0.00 - 0.12 K/uL    Immature Granulocytes (abs) 0.01 0.00 - 0.11 K/uL    NRBC (Absolute) 0.00 K/uL   COMP METABOLIC PANEL   Result Value Ref Range    Sodium 137 135 - 145 mmol/L    Potassium 3.5 (L) 3.6 - 5.5 mmol/L    Chloride 109 96 - 112 mmol/L    Co2 19 (L) 20 - 33 mmol/L    Anion Gap 9.0 0.0 - 11.9    Glucose 96 65 - 99 mg/dL    Bun 10 8 - 22 mg/dL    Creatinine 0.83 0.50 - 1.40 mg/dL    Calcium 8.8 8.4 - 10.2 mg/dL    AST(SGOT) 76 (H) 12 - 45 U/L    ALT(SGPT) 124 (H) 2 - 50 U/L    Alkaline Phosphatase 96 30 - 99 U/L    Total Bilirubin 0.6 0.1 - 1.5 mg/dL    Albumin 4.3 3.2 - 4.9 g/dL    Total Protein 6.7 6.0 - 8.2 g/dL    Globulin 2.4 1.9 - 3.5 g/dL    A-G Ratio 1.8 g/dL   ESTIMATED GFR    Result Value Ref Range    GFR If African American >60 >60 mL/min/1.73 m 2    GFR If Non African American >60 >60 mL/min/1.73 m 2         COURSE & MEDICAL DECISION MAKING  Pertinent Labs & Imaging studies reviewed. (See chart for details)  The patient is presenting with a headache that she feels typical for her migraines. The patient is treated with combination of Toradol fentanyl and Benadryl after obtaining a noncontrast CT which showed no evidence of bleed. At this time I will admit the patient for an intractable headache and MRI scanning to insure the stability of her known aneurysm. She has no evidence of meningitis clinically or subarachnoid hemorrhage on CT. I suspect this is a migraine but given her history I think is prudent to obtain imaging and she'll be admitted to the hospitalist    FINAL IMPRESSION  1. Intractable headache  2. Cerebral aneurysm  3.      Electronically signed by: Efraín Kolb, 9/17/2017

## 2017-09-18 NOTE — PROGRESS NOTES
Pt arrived on floor via gurney. Assumed care. Pt resting comfortably in bed with safety precautions in place.

## 2017-09-18 NOTE — PROGRESS NOTES
Hospital Medicine Interval Note  Date of Service:  9/18/2017    Chief Complaint  57 y.o.-year-old female admitted 9/17/2017 with intractable headache with history of chronic migraines.    Interval Problem Update  Pt c/o headache which worsens with coughing.    Consultants/Specialty  NONE    Disposition  Home when medically stable       Review of Systems   Respiratory: Positive for cough.    Neurological: Positive for headaches.   All other systems reviewed and are negative.     Physical Exam Laboratory/Imaging   Vitals:    09/17/17 2200 09/17/17 2250 09/18/17 0226 09/18/17 0700   BP: 143/76  101/63 125/69   Pulse: 60 (!) 57 66 62   Resp: 20 18 18 18   Temp: 36.4 °C (97.6 °F)  36.6 °C (97.9 °F) 36.6 °C (97.9 °F)   SpO2: 96% 97%  97%   Weight: 86.9 kg (191 lb 9.3 oz)      Height:         Physical Exam   Constitutional: She is oriented to person, place, and time. She appears well-developed and well-nourished.   HENT:   Head: Normocephalic and atraumatic.   Right Ear: External ear normal.   Left Ear: External ear normal.   Nose: Nose normal.   Mouth/Throat: No oropharyngeal exudate.   Eyes: Conjunctivae and EOM are normal. Pupils are equal, round, and reactive to light.   Neck: Normal range of motion. Neck supple.   Cardiovascular: Normal heart sounds.  Exam reveals no gallop and no friction rub.    No murmur heard.  Pulmonary/Chest: Effort normal and breath sounds normal.   Abdominal: Soft. Bowel sounds are normal.   Musculoskeletal: Normal range of motion.   Neurological: She is alert and oriented to person, place, and time. She has normal reflexes.   Skin: Skin is warm and dry.   Nursing note and vitals reviewed.   Lab Results   Component Value Date/Time    WBC 3.2 (L) 09/18/2017 04:46 AM    HEMOGLOBIN 11.9 (L) 09/18/2017 04:46 AM    HEMATOCRIT 36.4 (L) 09/18/2017 04:46 AM    PLATELETCT 232 09/18/2017 04:46 AM     Lab Results   Component Value Date/Time    SODIUM 139 09/18/2017 04:45 AM    POTASSIUM 3.5 (L)  09/18/2017 04:45 AM    CHLORIDE 114 (H) 09/18/2017 04:45 AM    CO2 21 09/18/2017 04:45 AM    GLUCOSE 104 (H) 09/18/2017 04:45 AM    BUN 12 09/18/2017 04:45 AM    CREATININE 0.77 09/18/2017 04:45 AM      Assessment/Plan    * Migraine headache- (present on admission)   Assessment & Plan    - appears to be complex with associated nausea/vomiting/vision changes  - started on prn IV meds with fioricet and antiemetics   - will start trial of IM sumatripan + oral Naproxen and monitor  - hx of cerebral aneurysm noted on MRI; CT negative for bleed; MRI pending  - cont monitoring           Hypokalemia   Assessment & Plan    - cont oral K+ supplementation        Cerebral aneurysm without rupture- (present on admission)   Assessment & Plan    - noted on MRI brain (old); repeat pending  - CT head negative for bleeding  - IR rec'd not amenable to coiling        Leukopenia   Assessment & Plan    - cont monitoring        Hyperlipidemia with target LDL less than 160- (present on admission)   Assessment & Plan    - cont encouraging low-fat diet and Lipitor           Quality-Core Measures

## 2017-09-18 NOTE — CARE PLAN
Problem: Safety  Goal: Will remain free from falls  Outcome: PROGRESSING AS EXPECTED  Pt encouraged to call for assistance as needed. Safety precautions in place. Regular rounding.    Problem: Pain Management  Goal: Pain level will decrease to patient's comfort goal  Outcome: PROGRESSING AS EXPECTED  Meds offered per MAR. Non pharmacologic interventions offered.

## 2017-09-18 NOTE — ED NOTES
Pt sleeping, respirations regular and unlabored.  notified we received a room assignment and will transport pt upstairs at this time.

## 2017-09-18 NOTE — ASSESSMENT & PLAN NOTE
- appears to be complex with associated nausea/vomiting/vision changes  - started on prn IV meds with fioricet and antiemetics   - will start trial of IM sumatripan + oral Naproxen and monitor  - hx of cerebral aneurysm noted on MRI; CT negative for bleed; MRI pending  - cont monitoring

## 2017-09-18 NOTE — PROGRESS NOTES
Bedside report given to Meghan SCHNEIDER. Plan of care discussed. Pt resting comfortably in bed with safety precautions in place.

## 2017-09-18 NOTE — H&P
" Hospital Medicine History and Physical    Date of Service  9/17/2017    Chief Complaint  Chief Complaint   Patient presents with   • Migraine     Pt BIB family c/o severe headache with hx of migrains. Pt states this headache has gotten progressivley worse since it began at apprx 0300 this am. Pt states \"hx of aneurysm that has't been checked since December\"       History of Presenting Illness  57 y.o. female who presented 9/17/2017 with unrelenting headache. Patient states that hers headache started Friday. She has chronic migraines. It intensified over the weekend and by this morning it was 10 out of 10 and it was accompanied by nausea and vertigo and dizziness but no vomiting the patient did not have an aura. Patient at this point will be admitted overnight for management of her headache will also evaluate her with an MRI of the head given her history of an aneurysm.   Consults   None     Code status  Full code    Review of systems  Patient complains of a headache, complaints of nausea, complains of vertigo, complaints of dizziness, complains of generalized weakness denies any chest pain any shortness of breath any abdominal pain denies any auras all other review of systems were reviewed and are negative.     Past Medical History  Past Medical History:   Diagnosis Date   • Hyperlipidemia LDL goal < 160 10/28/2010   • GERD (gastroesophageal reflux disease) 2005    Doing better now with weight loss 6/10.   • Menopausal and perimenopausal disorder 2005   • Allergy    • Anxiety     Due to financial issues, never took medication.   • ASTHMA    • Chronic bronchitis     Including multiple episodes of pnemonia, until 2001   • Headache    • Migraine     Rare, many years ago.       Surgical History  Past Surgical History:   Procedure Laterality Date   • RECOVERY  10/25/2015    Procedure: [facility] RECOVERY ONLY;  Surgeon: Ir-Recovery Surgery;  Location: SURGERY [facility];  Service:    • CRISTINA BY LAPAROSCOPY  11/16/2011    " Performed by LUIS ALBERTO ROBLES at SURGERY [facility] ORS   • HYSTEROSCOPY WITH VIDEO OPERATIVE  2010    Performed by PETER MORALES at SURGERY SAME DAY [facility] ORS   • PB  DELIVERY ONLY     • PRIMARY C SECTION     • MAMMOPLASTY AUGMENTATION      And removal calcium deposits   • VT ORAL SURGERY PROCEDURE      wisdom teeth extraction   • BUNIONECTOMY  1978/1984    x2   • PB ENLARGE BREAST WITH IMPLANT         Medications  No current facility-administered medications on file prior to encounter.      Current Outpatient Prescriptions on File Prior to Encounter   Medication Sig Dispense Refill   • alprazolam (XANAX) 0.25 MG Tab Take 1 Tab by mouth at bedtime as needed for Sleep. 30 Tab 2   • alprazolam (XANAX) 0.25 MG Tab Take 1 Tab by mouth at bedtime as needed for Sleep. 30 Tab 5   • topiramate (TOPAMAX) 25 MG Tab 2 Tabs twice daily 120 Tab 5   • acetaminophen/caffeine/butalbital 325-40-50 mg (FIORICET) -40 MG Tab Take 1 Tab by mouth every four hours as needed for Headache. 15 Tab 0   • hydrocodone/APAP 7.5/325 mg (NORCO) 7-325 Tab Take 1-2 Tabs by mouth every four hours as needed. 90 Tab 0   • alprazolam (XANAX) 1 MG Tab Take 1 Tab by mouth at bedtime as needed for Sleep. 1 tab prior to MRI. May repeat x 1 2 Tab 0   • Ibuprofen (ADVIL MIGRAINE) 200 MG Cap Take  by mouth.     • Diphenhydramine-APAP, sleep, (TYLENOL PM EXTRA STRENGTH PO) Take  by mouth.     • IRON CR PO Take  by mouth.     • Coenzyme Q10-Fish Oil-Vit E (CO-Q 10 OMEGA-3 FISH OIL PO) Take  by mouth.     • Ginkgo Biloba Extract by Does not apply route.     • alprazolam (XANAX) 1 MG Tab Take 1 Tab by mouth at bedtime as needed for Sleep. 1 Tab prior to MRI. May repeat x 1 2 Tab 0   • sumatriptan (IMITREX) 100 MG tablet Take 1 Tab by mouth Once PRN for Migraine for up to 1 dose. 10 Tab 3   • promethazine (PHENERGAN) 25 MG Tab Take 1 Tab by mouth every 6 hours as needed for Nausea/Vomiting. 30 Tab 0   •  Oxycodone-Acetaminophen (PERCOCET-10)  MG Tab      • beclomethasone (QVAR) 80 MCG/ACT inhaler Inhale 1 Puff by mouth 2 times a day.     • cetirizine (ZYRTEC) 10 MG Tab Take 10 mg by mouth every day.         Family History  Family History   Problem Relation Age of Onset   • Cancer Mother 53     Ovarian  at 57 from it   • Diabetes Father        Social History  Social History   Substance Use Topics   • Smoking status: Never Smoker   • Smokeless tobacco: Never Used   • Alcohol use 0.0 - 0.5 oz/week      Comment: once a week       Allergies  Allergies   Allergen Reactions   • Nkda [No Known Drug Allergy]    • Other Environmental         Physical Exam  Laboratory   Hemodynamics  Temp (24hrs), Av.9 °C (98.4 °F), Min:36.9 °C (98.4 °F), Max:36.9 °C (98.4 °F)   Temperature: 36.9 °C (98.4 °F)  Pulse  Av  Min: 71  Max: 71    Blood Pressure: 152/89      Respiratory      Respiration: 18, Pulse Oximetry: 96 %             Physical Exam  57-year-old female who currently is in no acute distress.  She is alert awake oriented ×3 pleasant cooperative female with her  at bedside.  Head is atraumatic, she has a generalized headache that is wrapping around like a band.  Eyes following normal range of gaze, pupils are equal round and reactive bilaterally, sclerae anicteric, conjunctiva is of normal hue.  Nose is midline without any discharge and no nasal fracture identified no epistaxis.  He is bilaterally intact without any discharge no mastoid tenderness.  Oral cavity moist extremities no peripheral infection in the oral cavity.  Neck soft supple no JVD carotid bruits thyromegaly or lymphadenopathy appreciated.  Chest wall moves equally with inspiration and expiration of paradoxical motion no reproducible chest with tenderness.  Heart S1-S2 no murmurs or gallops instructed.  Lungs clear to auscultation no rales or rhonchi detected.  Abdomen is soft bowel sounds present all 4 quadrants no hepatomegaly, no  splenomegaly, no rebound, no tenderness.  Genital exam deferred, rectal exam deferred.  Upper and lower extremity spasm pulses no edema good muscle strength and muscle tone positive deep tendon reflexes.  Skin normal turgor no rashes or abrasions identified.  Neurologically cranial nerves II through XII grossly within normal limits without any focal deficits appreciated.    Recent Labs      09/17/17   1805   WBC  3.6*   RBC  4.33   HEMOGLOBIN  13.3   HEMATOCRIT  38.7   MCV  89.4   MCH  30.7   MCHC  34.4   RDW  45.1   PLATELETCT  257   MPV  11.4     Recent Labs      09/17/17   1805   SODIUM  137   POTASSIUM  3.5*   CHLORIDE  109   CO2  19*   GLUCOSE  96   BUN  10   CREATININE  0.83   CALCIUM  8.8     Recent Labs      09/17/17   1805   ALTSGPT  124*   ASTSGOT  76*   ALKPHOSPHAT  96   TBILIRUBIN  0.6   GLUCOSE  96                 No results found for: TROPONINI  Urinalysis:    Lab Results  Component Value Date/Time   SPECGRAVITY 1.004 03/26/2010 1007   GLUCOSEUR Negative 03/26/2010 1007   KETONES Negative 03/26/2010 1007   NITRITE Negative 03/26/2010 1007        Imaging  CT-HEAD W/O   Final Result         1.  NO ACUTE ABNORMALITIES ARE NOTED ON CT SCAN OF THE HEAD.      2.  Status post left ICA aneurysm repair.      3.  Chiari I malformation again identified.         MR-BRAIN-WITH & W/O    (Results Pending)        Assessment/Plan     I anticipate this patient is appropriate for observation status at this time.    Migraine headache- (present on admission)   Assessment & Plan    Patient states that her headache began Friday when it was just a dull headache. The patient then intensified with the headache by this morning it was 10 out of 10 accompanied by nausea and vertigo the patient did not have any auras the patient at this point came in to be evaluated and at this point in the emergency room after unsuccessful and medical optimization the patient will be admitted overnight for an attempt to control her headache. At  this point she will continue with the Topamax, she will continue at this point with fentanyl for severe pain Norco for moderate pain acetaminophen for mild pain.        Hypokalemia   Assessment & Plan    Patient's potassium supplementation will be provided we will monitor potassium levels.        Cerebral aneurysm without rupture- (present on admission)   Assessment & Plan    MRI of the brain will be requested most recently she had a 2 x 3 mm aneurysm that was not amenable to coiling per interventional radiology.        Leukopenia   Assessment & Plan    Patient white blood cell count is low at 3.6. At this point we'll monitor.        Hyperlipidemia with target LDL less than 160- (present on admission)   Assessment & Plan    Continue with low-fat low-cholesterol diet patient patient's most recent cholesterol was 269 with an HDL of 60 and LDL of 178 she will be initiated at this point on Lipitor.            VTE prophylaxis:Sequential .

## 2017-09-18 NOTE — ED NOTES
"Pt reports benadryl \" just barely took the edge off\". Still appears uncomfortable. Lights dimmed for comfort.   "

## 2017-09-19 ENCOUNTER — APPOINTMENT (OUTPATIENT)
Dept: RADIOLOGY | Facility: MEDICAL CENTER | Age: 57
End: 2017-09-19
Attending: INTERNAL MEDICINE
Payer: COMMERCIAL

## 2017-09-19 ENCOUNTER — HOSPITAL ENCOUNTER (OUTPATIENT)
Facility: MEDICAL CENTER | Age: 57
End: 2017-09-21
Attending: INTERNAL MEDICINE | Admitting: INTERNAL MEDICINE
Payer: COMMERCIAL

## 2017-09-19 ENCOUNTER — RESOLUTE PROFESSIONAL BILLING HOSPITAL PROF FEE (OUTPATIENT)
Dept: HOSPITALIST | Facility: MEDICAL CENTER | Age: 57
End: 2017-09-19
Payer: COMMERCIAL

## 2017-09-19 VITALS
DIASTOLIC BLOOD PRESSURE: 58 MMHG | WEIGHT: 196.87 LBS | BODY MASS INDEX: 31.64 KG/M2 | SYSTOLIC BLOOD PRESSURE: 120 MMHG | HEART RATE: 65 BPM | HEIGHT: 66 IN | TEMPERATURE: 97.4 F | OXYGEN SATURATION: 96 % | RESPIRATION RATE: 16 BRPM

## 2017-09-19 PROBLEM — G43.909 MIGRAINE HEADACHE: Status: RESOLVED | Noted: 2017-09-17 | Resolved: 2017-09-19

## 2017-09-19 PROBLEM — D72.819 LEUKOPENIA: Status: RESOLVED | Noted: 2017-09-17 | Resolved: 2017-09-19

## 2017-09-19 LAB
ALBUMIN SERPL BCP-MCNC: 4 G/DL (ref 3.2–4.9)
ALBUMIN/GLOB SERPL: 1.6 G/DL
ALP SERPL-CCNC: 88 U/L (ref 30–99)
ALT SERPL-CCNC: 84 U/L (ref 2–50)
AMMONIA PLAS-SCNC: 56 UMOL/L (ref 11–45)
ANION GAP SERPL CALC-SCNC: 6 MMOL/L (ref 0–11.9)
ANION GAP SERPL CALC-SCNC: 9 MMOL/L (ref 0–11.9)
APTT PPP: 27.3 SEC (ref 24.7–36)
AST SERPL-CCNC: 32 U/L (ref 12–45)
BASOPHILS # BLD AUTO: 0.3 % (ref 0–1.8)
BASOPHILS # BLD AUTO: 0.4 % (ref 0–1.8)
BASOPHILS # BLD: 0.02 K/UL (ref 0–0.12)
BASOPHILS # BLD: 0.04 K/UL (ref 0–0.12)
BILIRUB SERPL-MCNC: 0.7 MG/DL (ref 0.1–1.5)
BUN SERPL-MCNC: 17 MG/DL (ref 8–22)
BUN SERPL-MCNC: 9 MG/DL (ref 8–22)
CALCIUM SERPL-MCNC: 8.2 MG/DL (ref 8.4–10.2)
CALCIUM SERPL-MCNC: 8.9 MG/DL (ref 8.4–10.2)
CHLORIDE SERPL-SCNC: 111 MMOL/L (ref 96–112)
CHLORIDE SERPL-SCNC: 112 MMOL/L (ref 96–112)
CO2 SERPL-SCNC: 19 MMOL/L (ref 20–33)
CO2 SERPL-SCNC: 20 MMOL/L (ref 20–33)
CREAT SERPL-MCNC: 0.85 MG/DL (ref 0.5–1.4)
CREAT SERPL-MCNC: 0.98 MG/DL (ref 0.5–1.4)
EKG IMPRESSION: NORMAL
EOSINOPHIL # BLD AUTO: 0.04 K/UL (ref 0–0.51)
EOSINOPHIL # BLD AUTO: 0.05 K/UL (ref 0–0.51)
EOSINOPHIL NFR BLD: 0.4 % (ref 0–6.9)
EOSINOPHIL NFR BLD: 0.7 % (ref 0–6.9)
ERYTHROCYTE [DISTWIDTH] IN BLOOD BY AUTOMATED COUNT: 45.1 FL (ref 35.9–50)
ERYTHROCYTE [DISTWIDTH] IN BLOOD BY AUTOMATED COUNT: 46.5 FL (ref 35.9–50)
GFR SERPL CREATININE-BSD FRML MDRD: 58 ML/MIN/1.73 M 2
GFR SERPL CREATININE-BSD FRML MDRD: >60 ML/MIN/1.73 M 2
GLOBULIN SER CALC-MCNC: 2.5 G/DL (ref 1.9–3.5)
GLUCOSE SERPL-MCNC: 111 MG/DL (ref 65–99)
GLUCOSE SERPL-MCNC: 112 MG/DL (ref 65–99)
HCT VFR BLD AUTO: 38 % (ref 37–47)
HCT VFR BLD AUTO: 38.3 % (ref 37–47)
HGB BLD-MCNC: 12.7 G/DL (ref 12–16)
HGB BLD-MCNC: 12.9 G/DL (ref 12–16)
IMM GRANULOCYTES # BLD AUTO: 0.01 K/UL (ref 0–0.11)
IMM GRANULOCYTES # BLD AUTO: 0.02 K/UL (ref 0–0.11)
IMM GRANULOCYTES NFR BLD AUTO: 0.1 % (ref 0–0.9)
IMM GRANULOCYTES NFR BLD AUTO: 0.3 % (ref 0–0.9)
LYMPHOCYTES # BLD AUTO: 1.7 K/UL (ref 1–4.8)
LYMPHOCYTES # BLD AUTO: 4.3 K/UL (ref 1–4.8)
LYMPHOCYTES NFR BLD: 22.6 % (ref 22–41)
LYMPHOCYTES NFR BLD: 48 % (ref 22–41)
MCH RBC QN AUTO: 29.9 PG (ref 27–33)
MCH RBC QN AUTO: 30.2 PG (ref 27–33)
MCHC RBC AUTO-ENTMCNC: 33.2 G/DL (ref 33.6–35)
MCHC RBC AUTO-ENTMCNC: 33.9 G/DL (ref 33.6–35)
MCV RBC AUTO: 88 FL (ref 81.4–97.8)
MCV RBC AUTO: 91 FL (ref 81.4–97.8)
MONOCYTES # BLD AUTO: 0.39 K/UL (ref 0–0.85)
MONOCYTES # BLD AUTO: 0.6 K/UL (ref 0–0.85)
MONOCYTES NFR BLD AUTO: 5.2 % (ref 0–13.4)
MONOCYTES NFR BLD AUTO: 6.7 % (ref 0–13.4)
NEUTROPHILS # BLD AUTO: 3.96 K/UL (ref 2–7.15)
NEUTROPHILS # BLD AUTO: 5.33 K/UL (ref 2–7.15)
NEUTROPHILS NFR BLD: 44.4 % (ref 44–72)
NEUTROPHILS NFR BLD: 70.9 % (ref 44–72)
NRBC # BLD AUTO: 0 K/UL
NRBC # BLD AUTO: 0 K/UL
NRBC BLD AUTO-RTO: 0 /100 WBC
NRBC BLD AUTO-RTO: 0 /100 WBC
PLATELET # BLD AUTO: 236 K/UL (ref 164–446)
PLATELET # BLD AUTO: 247 K/UL (ref 164–446)
PMV BLD AUTO: 11.7 FL (ref 9–12.9)
PMV BLD AUTO: 11.8 FL (ref 9–12.9)
POTASSIUM SERPL-SCNC: 3.5 MMOL/L (ref 3.6–5.5)
POTASSIUM SERPL-SCNC: 3.5 MMOL/L (ref 3.6–5.5)
PROT SERPL-MCNC: 6.5 G/DL (ref 6–8.2)
RBC # BLD AUTO: 4.21 M/UL (ref 4.2–5.4)
RBC # BLD AUTO: 4.32 M/UL (ref 4.2–5.4)
SODIUM SERPL-SCNC: 138 MMOL/L (ref 135–145)
SODIUM SERPL-SCNC: 139 MMOL/L (ref 135–145)
TROPONIN I SERPL-MCNC: <0.01 NG/ML (ref 0–0.04)
TROPONIN I SERPL-MCNC: <0.02 NG/ML (ref 0–0.04)
TSH SERPL DL<=0.005 MIU/L-ACNC: 2.49 UIU/ML (ref 0.3–3.7)
WBC # BLD AUTO: 7.5 K/UL (ref 4.8–10.8)
WBC # BLD AUTO: 9 K/UL (ref 4.8–10.8)

## 2017-09-19 PROCEDURE — A9270 NON-COVERED ITEM OR SERVICE: HCPCS | Performed by: INTERNAL MEDICINE

## 2017-09-19 PROCEDURE — 70450 CT HEAD/BRAIN W/O DYE: CPT

## 2017-09-19 PROCEDURE — 82140 ASSAY OF AMMONIA: CPT

## 2017-09-19 PROCEDURE — 85025 COMPLETE CBC W/AUTO DIFF WBC: CPT

## 2017-09-19 PROCEDURE — 99220 PR INITIAL OBSERVATION CARE,LEVL III: CPT | Performed by: INTERNAL MEDICINE

## 2017-09-19 PROCEDURE — 700105 HCHG RX REV CODE 258: Performed by: INTERNAL MEDICINE

## 2017-09-19 PROCEDURE — 700111 HCHG RX REV CODE 636 W/ 250 OVERRIDE (IP): Performed by: HOSPITALIST

## 2017-09-19 PROCEDURE — 700102 HCHG RX REV CODE 250 W/ 637 OVERRIDE(OP): Performed by: INTERNAL MEDICINE

## 2017-09-19 PROCEDURE — 87040 BLOOD CULTURE FOR BACTERIA: CPT | Mod: 91

## 2017-09-19 PROCEDURE — 93005 ELECTROCARDIOGRAM TRACING: CPT | Performed by: INTERNAL MEDICINE

## 2017-09-19 PROCEDURE — 700111 HCHG RX REV CODE 636 W/ 250 OVERRIDE (IP): Performed by: INTERNAL MEDICINE

## 2017-09-19 PROCEDURE — G0378 HOSPITAL OBSERVATION PER HR: HCPCS

## 2017-09-19 PROCEDURE — 80053 COMPREHEN METABOLIC PANEL: CPT

## 2017-09-19 PROCEDURE — 84443 ASSAY THYROID STIM HORMONE: CPT

## 2017-09-19 PROCEDURE — 36415 COLL VENOUS BLD VENIPUNCTURE: CPT

## 2017-09-19 PROCEDURE — A9270 NON-COVERED ITEM OR SERVICE: HCPCS | Performed by: NURSE PRACTITIONER

## 2017-09-19 PROCEDURE — 71010 DX-CHEST-PORTABLE (1 VIEW): CPT

## 2017-09-19 PROCEDURE — 93010 ELECTROCARDIOGRAM REPORT: CPT | Performed by: INTERNAL MEDICINE

## 2017-09-19 PROCEDURE — 700102 HCHG RX REV CODE 250 W/ 637 OVERRIDE(OP): Performed by: NURSE PRACTITIONER

## 2017-09-19 PROCEDURE — 700102 HCHG RX REV CODE 250 W/ 637 OVERRIDE(OP): Performed by: HOSPITALIST

## 2017-09-19 PROCEDURE — 85730 THROMBOPLASTIN TIME PARTIAL: CPT

## 2017-09-19 PROCEDURE — 84484 ASSAY OF TROPONIN QUANT: CPT | Mod: 91

## 2017-09-19 PROCEDURE — 84484 ASSAY OF TROPONIN QUANT: CPT

## 2017-09-19 PROCEDURE — 80048 BASIC METABOLIC PNL TOTAL CA: CPT

## 2017-09-19 PROCEDURE — A9270 NON-COVERED ITEM OR SERVICE: HCPCS | Performed by: HOSPITALIST

## 2017-09-19 PROCEDURE — 96365 THER/PROPH/DIAG IV INF INIT: CPT

## 2017-09-19 RX ORDER — HYDROCODONE BITARTRATE AND ACETAMINOPHEN 7.5; 325 MG/1; MG/1
1-2 TABLET ORAL EVERY 4 HOURS PRN
Qty: 20 TAB | Refills: 0 | Status: ON HOLD
Start: 2017-09-19 | End: 2017-09-21

## 2017-09-19 RX ORDER — NAPROXEN 250 MG/1
500 TABLET ORAL 2 TIMES DAILY
Status: CANCELLED | OUTPATIENT
Start: 2017-09-19

## 2017-09-19 RX ORDER — TOPIRAMATE 25 MG/1
50 TABLET ORAL 2 TIMES DAILY
Status: CANCELLED | OUTPATIENT
Start: 2017-09-19

## 2017-09-19 RX ORDER — POLYETHYLENE GLYCOL 3350 17 G/17G
1 POWDER, FOR SOLUTION ORAL
Status: DISCONTINUED | OUTPATIENT
Start: 2017-09-19 | End: 2017-09-21 | Stop reason: HOSPADM

## 2017-09-19 RX ORDER — TOPIRAMATE 25 MG/1
50 TABLET ORAL 2 TIMES DAILY
Status: DISCONTINUED | OUTPATIENT
Start: 2017-09-19 | End: 2017-09-20

## 2017-09-19 RX ORDER — POTASSIUM CHLORIDE 20 MEQ/1
20 TABLET, EXTENDED RELEASE ORAL DAILY
Qty: 60 TAB | Refills: 11
Start: 2017-09-19 | End: 2018-12-03

## 2017-09-19 RX ORDER — NAPROXEN 500 MG/1
500 TABLET ORAL 2 TIMES DAILY
Status: DISCONTINUED | OUTPATIENT
Start: 2017-09-19 | End: 2017-09-20

## 2017-09-19 RX ORDER — ATORVASTATIN CALCIUM 20 MG/1
20 TABLET, FILM COATED ORAL
Qty: 30 TAB
Start: 2017-09-19

## 2017-09-19 RX ORDER — TOPIRAMATE 50 MG/1
50 TABLET, FILM COATED ORAL 2 TIMES DAILY
Qty: 60 TAB | Refills: 3
Start: 2017-09-19 | End: 2018-09-07

## 2017-09-19 RX ORDER — POLYETHYLENE GLYCOL 3350 17 G/17G
1 POWDER, FOR SOLUTION ORAL
Status: CANCELLED | OUTPATIENT
Start: 2017-09-19

## 2017-09-19 RX ORDER — BISACODYL 10 MG
10 SUPPOSITORY, RECTAL RECTAL
Status: DISCONTINUED | OUTPATIENT
Start: 2017-09-19 | End: 2017-09-21 | Stop reason: HOSPADM

## 2017-09-19 RX ORDER — ACETAMINOPHEN 325 MG/1
650 TABLET ORAL EVERY 4 HOURS PRN
Status: DISCONTINUED | OUTPATIENT
Start: 2017-09-19 | End: 2017-09-21 | Stop reason: HOSPADM

## 2017-09-19 RX ORDER — ONDANSETRON 4 MG/1
4 TABLET, ORALLY DISINTEGRATING ORAL EVERY 4 HOURS PRN
Status: CANCELLED | OUTPATIENT
Start: 2017-09-19

## 2017-09-19 RX ORDER — LABETALOL HYDROCHLORIDE 5 MG/ML
10 INJECTION, SOLUTION INTRAVENOUS EVERY 4 HOURS PRN
Status: CANCELLED | OUTPATIENT
Start: 2017-09-19

## 2017-09-19 RX ORDER — BENZONATATE 100 MG/1
100 CAPSULE ORAL 3 TIMES DAILY PRN
Status: CANCELLED | OUTPATIENT
Start: 2017-09-19

## 2017-09-19 RX ORDER — FLUTICASONE PROPIONATE 50 MCG
2 SPRAY, SUSPENSION (ML) NASAL DAILY
Status: CANCELLED | OUTPATIENT
Start: 2017-09-20

## 2017-09-19 RX ORDER — CETIRIZINE HYDROCHLORIDE 10 MG/1
10 TABLET ORAL DAILY
Status: DISCONTINUED | OUTPATIENT
Start: 2017-09-20 | End: 2017-09-21 | Stop reason: HOSPADM

## 2017-09-19 RX ORDER — BUTALBITAL, ACETAMINOPHEN AND CAFFEINE 50; 325; 40 MG/1; MG/1; MG/1
1 TABLET ORAL EVERY 4 HOURS PRN
Status: DISCONTINUED | OUTPATIENT
Start: 2017-09-19 | End: 2017-09-20

## 2017-09-19 RX ORDER — ONDANSETRON 2 MG/ML
4 INJECTION INTRAMUSCULAR; INTRAVENOUS EVERY 4 HOURS PRN
Status: DISCONTINUED | OUTPATIENT
Start: 2017-09-19 | End: 2017-09-21 | Stop reason: HOSPADM

## 2017-09-19 RX ORDER — ACETAMINOPHEN 325 MG/1
650 TABLET ORAL EVERY 4 HOURS PRN
Qty: 30 TAB | Refills: 0
Start: 2017-09-19 | End: 2019-01-25

## 2017-09-19 RX ORDER — PROMETHAZINE HYDROCHLORIDE 25 MG/1
12.5-25 TABLET ORAL EVERY 4 HOURS PRN
Status: CANCELLED | OUTPATIENT
Start: 2017-09-19

## 2017-09-19 RX ORDER — HYDROCODONE BITARTRATE AND ACETAMINOPHEN 7.5; 325 MG/1; MG/1
1-2 TABLET ORAL EVERY 4 HOURS PRN
Status: CANCELLED | OUTPATIENT
Start: 2017-09-19

## 2017-09-19 RX ORDER — ONDANSETRON 2 MG/ML
4 INJECTION INTRAMUSCULAR; INTRAVENOUS EVERY 4 HOURS PRN
Status: CANCELLED | OUTPATIENT
Start: 2017-09-19

## 2017-09-19 RX ORDER — CETIRIZINE HYDROCHLORIDE 10 MG/1
10 TABLET ORAL DAILY
Status: CANCELLED | OUTPATIENT
Start: 2017-09-20

## 2017-09-19 RX ORDER — ATORVASTATIN CALCIUM 20 MG/1
20 TABLET, FILM COATED ORAL
Status: DISCONTINUED | OUTPATIENT
Start: 2017-09-19 | End: 2017-09-21 | Stop reason: HOSPADM

## 2017-09-19 RX ORDER — BISACODYL 10 MG
10 SUPPOSITORY, RECTAL RECTAL
Status: CANCELLED | OUTPATIENT
Start: 2017-09-19

## 2017-09-19 RX ORDER — ONDANSETRON 4 MG/1
4 TABLET, ORALLY DISINTEGRATING ORAL EVERY 4 HOURS PRN
Qty: 10 TAB | Refills: 0
Start: 2017-09-19 | End: 2019-01-25

## 2017-09-19 RX ORDER — ALPRAZOLAM 0.5 MG/1
1 TABLET ORAL NIGHTLY PRN
Status: CANCELLED | OUTPATIENT
Start: 2017-09-19

## 2017-09-19 RX ORDER — CETIRIZINE HYDROCHLORIDE 10 MG/1
10 TABLET ORAL DAILY
Qty: 30 TAB
Start: 2017-09-19

## 2017-09-19 RX ORDER — ALPRAZOLAM 0.5 MG/1
1 TABLET ORAL NIGHTLY PRN
Status: DISCONTINUED | OUTPATIENT
Start: 2017-09-19 | End: 2017-09-21 | Stop reason: HOSPADM

## 2017-09-19 RX ORDER — ALPRAZOLAM 0.25 MG/1
0.5 TABLET ORAL NIGHTLY PRN
Qty: 30 TAB | Refills: 2
Start: 2017-09-19 | End: 2018-03-16 | Stop reason: SDUPTHER

## 2017-09-19 RX ORDER — PROMETHAZINE HYDROCHLORIDE 25 MG/1
12.5-25 SUPPOSITORY RECTAL EVERY 4 HOURS PRN
Status: DISCONTINUED | OUTPATIENT
Start: 2017-09-19 | End: 2017-09-21 | Stop reason: HOSPADM

## 2017-09-19 RX ORDER — BUTALBITAL, ACETAMINOPHEN AND CAFFEINE 50; 325; 40 MG/1; MG/1; MG/1
1 TABLET ORAL EVERY 4 HOURS PRN
Status: CANCELLED | OUTPATIENT
Start: 2017-09-19

## 2017-09-19 RX ORDER — NAPROXEN 500 MG/1
500 TABLET ORAL 2 TIMES DAILY
Qty: 60 TAB
Start: 2017-09-19 | End: 2019-01-25

## 2017-09-19 RX ORDER — AMOXICILLIN 250 MG
2 CAPSULE ORAL 2 TIMES DAILY
Status: DISCONTINUED | OUTPATIENT
Start: 2017-09-19 | End: 2017-09-21 | Stop reason: HOSPADM

## 2017-09-19 RX ORDER — PROMETHAZINE HYDROCHLORIDE 25 MG/1
12.5-25 TABLET ORAL EVERY 4 HOURS PRN
Status: DISCONTINUED | OUTPATIENT
Start: 2017-09-19 | End: 2017-09-21 | Stop reason: HOSPADM

## 2017-09-19 RX ORDER — TOPIRAMATE 25 MG/1
50 TABLET ORAL 2 TIMES DAILY
Status: DISCONTINUED | OUTPATIENT
Start: 2017-09-19 | End: 2017-09-19 | Stop reason: HOSPADM

## 2017-09-19 RX ORDER — BUTALBITAL, ACETAMINOPHEN AND CAFFEINE 50; 325; 40 MG/1; MG/1; MG/1
1 TABLET ORAL EVERY 4 HOURS PRN
Qty: 30 TAB | Refills: 0
Start: 2017-09-19 | End: 2019-01-09

## 2017-09-19 RX ORDER — ATORVASTATIN CALCIUM 10 MG/1
20 TABLET, FILM COATED ORAL
Status: CANCELLED | OUTPATIENT
Start: 2017-09-19

## 2017-09-19 RX ORDER — ACETAMINOPHEN 325 MG/1
650 TABLET ORAL EVERY 4 HOURS PRN
Status: DISCONTINUED | OUTPATIENT
Start: 2017-09-19 | End: 2017-09-19 | Stop reason: HOSPADM

## 2017-09-19 RX ORDER — ONDANSETRON 4 MG/1
4 TABLET, ORALLY DISINTEGRATING ORAL EVERY 4 HOURS PRN
Status: DISCONTINUED | OUTPATIENT
Start: 2017-09-19 | End: 2017-09-21 | Stop reason: HOSPADM

## 2017-09-19 RX ORDER — DIPHENHYDRAMINE HCL 25 MG
25 TABLET ORAL
Status: CANCELLED | OUTPATIENT
Start: 2017-09-19

## 2017-09-19 RX ORDER — AMOXICILLIN 250 MG
2 CAPSULE ORAL 2 TIMES DAILY
Status: CANCELLED | OUTPATIENT
Start: 2017-09-19

## 2017-09-19 RX ORDER — DIPHENHYDRAMINE HCL 25 MG
25 TABLET ORAL
Qty: 30 TAB | Refills: 0
Start: 2017-09-19

## 2017-09-19 RX ORDER — LABETALOL HYDROCHLORIDE 5 MG/ML
10 INJECTION, SOLUTION INTRAVENOUS EVERY 4 HOURS PRN
Status: DISCONTINUED | OUTPATIENT
Start: 2017-09-19 | End: 2017-09-21 | Stop reason: HOSPADM

## 2017-09-19 RX ORDER — PROMETHAZINE HYDROCHLORIDE 25 MG/1
12.5-25 SUPPOSITORY RECTAL EVERY 4 HOURS PRN
Status: CANCELLED | OUTPATIENT
Start: 2017-09-19

## 2017-09-19 RX ORDER — FLUTICASONE PROPIONATE 50 MCG
2 SPRAY, SUSPENSION (ML) NASAL DAILY
Status: DISCONTINUED | OUTPATIENT
Start: 2017-09-20 | End: 2017-09-21 | Stop reason: HOSPADM

## 2017-09-19 RX ORDER — POTASSIUM CHLORIDE 20 MEQ/1
20 TABLET, EXTENDED RELEASE ORAL DAILY
Status: DISCONTINUED | OUTPATIENT
Start: 2017-09-20 | End: 2017-09-21 | Stop reason: HOSPADM

## 2017-09-19 RX ORDER — HYDROCODONE BITARTRATE AND ACETAMINOPHEN 7.5; 325 MG/1; MG/1
1-2 TABLET ORAL EVERY 4 HOURS PRN
Status: DISCONTINUED | OUTPATIENT
Start: 2017-09-19 | End: 2017-09-21 | Stop reason: HOSPADM

## 2017-09-19 RX ORDER — DIPHENHYDRAMINE HCL 25 MG
25 TABLET ORAL
Status: DISCONTINUED | OUTPATIENT
Start: 2017-09-19 | End: 2017-09-21 | Stop reason: HOSPADM

## 2017-09-19 RX ORDER — BENZONATATE 100 MG/1
100 CAPSULE ORAL 3 TIMES DAILY PRN
Status: DISCONTINUED | OUTPATIENT
Start: 2017-09-19 | End: 2017-09-21 | Stop reason: HOSPADM

## 2017-09-19 RX ORDER — POTASSIUM CHLORIDE 20 MEQ/1
20 TABLET, EXTENDED RELEASE ORAL DAILY
Status: CANCELLED | OUTPATIENT
Start: 2017-09-20

## 2017-09-19 RX ORDER — ACETAMINOPHEN 325 MG/1
650 TABLET ORAL EVERY 4 HOURS PRN
Status: CANCELLED | OUTPATIENT
Start: 2017-09-19

## 2017-09-19 RX ADMIN — BENZONATATE 100 MG: 100 CAPSULE ORAL at 11:30

## 2017-09-19 RX ADMIN — NAPROXEN 500 MG: 500 TABLET ORAL at 20:27

## 2017-09-19 RX ADMIN — CETIRIZINE HYDROCHLORIDE 10 MG: 10 TABLET, FILM COATED ORAL at 08:39

## 2017-09-19 RX ADMIN — CEFTRIAXONE 2 G: 2 INJECTION, POWDER, FOR SOLUTION INTRAMUSCULAR; INTRAVENOUS at 23:46

## 2017-09-19 RX ADMIN — GUAIFENESIN 200 MG: 100 SOLUTION ORAL at 21:43

## 2017-09-19 RX ADMIN — ACETAMINOPHEN 650 MG: 325 TABLET, FILM COATED ORAL at 06:16

## 2017-09-19 RX ADMIN — ATORVASTATIN CALCIUM 20 MG: 20 TABLET, FILM COATED ORAL at 20:27

## 2017-09-19 RX ADMIN — GUAIFENESIN 200 MG: 100 SOLUTION ORAL at 09:02

## 2017-09-19 RX ADMIN — ENOXAPARIN SODIUM 40 MG: 100 INJECTION SUBCUTANEOUS at 08:41

## 2017-09-19 RX ADMIN — STANDARDIZED SENNA CONCENTRATE AND DOCUSATE SODIUM 2 TABLET: 8.6; 5 TABLET, FILM COATED ORAL at 20:27

## 2017-09-19 RX ADMIN — BUTALBITAL, ACETAMINOPHEN, AND CAFFEINE 1 TABLET: 50; 325; 40 TABLET ORAL at 21:46

## 2017-09-19 RX ADMIN — TOPIRAMATE 50 MG: 25 TABLET, FILM COATED ORAL at 20:27

## 2017-09-19 RX ADMIN — BUTALBITAL, ACETAMINOPHEN, AND CAFFEINE 1 TABLET: 50; 325; 40 TABLET ORAL at 09:03

## 2017-09-19 RX ADMIN — TOPIRAMATE 25 MG: 25 TABLET, FILM COATED ORAL at 08:39

## 2017-09-19 RX ADMIN — POLYETHYLENE GLYCOL 3350 1 PACKET: 17 POWDER, FOR SOLUTION ORAL at 11:32

## 2017-09-19 RX ADMIN — NAPROXEN 500 MG: 250 TABLET ORAL at 08:38

## 2017-09-19 RX ADMIN — POTASSIUM CHLORIDE 20 MEQ: 1500 TABLET, EXTENDED RELEASE ORAL at 08:39

## 2017-09-19 ASSESSMENT — COGNITIVE AND FUNCTIONAL STATUS - GENERAL
CLIMB 3 TO 5 STEPS WITH RAILING: A LITTLE
MOBILITY SCORE: 20
STANDING UP FROM CHAIR USING ARMS: A LITTLE
SUGGESTED CMS G CODE MODIFIER DAILY ACTIVITY: CI
SUGGESTED CMS G CODE MODIFIER MOBILITY: CJ
MOVING FROM LYING ON BACK TO SITTING ON SIDE OF FLAT BED: A LITTLE
HELP NEEDED FOR BATHING: A LITTLE
DAILY ACTIVITIY SCORE: 23
WALKING IN HOSPITAL ROOM: A LITTLE

## 2017-09-19 ASSESSMENT — PATIENT HEALTH QUESTIONNAIRE - PHQ9
2. FEELING DOWN, DEPRESSED, IRRITABLE, OR HOPELESS: NOT AT ALL
1. LITTLE INTEREST OR PLEASURE IN DOING THINGS: NOT AT ALL
SUM OF ALL RESPONSES TO PHQ9 QUESTIONS 1 AND 2: 0
SUM OF ALL RESPONSES TO PHQ QUESTIONS 1-9: 0

## 2017-09-19 ASSESSMENT — LIFESTYLE VARIABLES
ALCOHOL_USE: NO
EVER_SMOKED: NEVER

## 2017-09-19 ASSESSMENT — PAIN SCALES - GENERAL
PAINLEVEL_OUTOF10: 8
PAINLEVEL_OUTOF10: 3
PAINLEVEL_OUTOF10: 7
PAINLEVEL_OUTOF10: 3
PAINLEVEL_OUTOF10: 7

## 2017-09-19 NOTE — DISCHARGE PLANNING
Transportation has been arranged with [facility] to transfer the patient via [facility] to [facility] at 1730. SW on floor Cassie has been notified.

## 2017-09-19 NOTE — DISCHARGE SUMMARY
"CHIEF COMPLAINT ON ADMISSION  Chief Complaint   Patient presents with   • Migraine     Pt BIB family c/o severe headache with hx of migrains. Pt states this headache has gotten progressivley worse since it began at apprx 0300 this am. Pt states \"hx of aneurysm that has't been checked since December\"       CODE STATUS  Full Code    HPI & HOSPITAL COURSE  This is a 57 y.o. female here with unrelenting headache.  The patient has history of migraine headache.  The patient has received Topamax, Fioricet, norco, naproxen, and sumatriptan but still has headache which is 5/10 per the patient. Thus far, the patient had 2 CT head without contrast done.  Neither Ct head show any acute abnormalities but chiari  I malformation is identified.   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  There is a left carotid terminus aneurysm which is status post coiling.  This morning the patient passed out twice while using the bedside commode.  The patient denied any chest pain or shortness of breath prior to passing out.   After waking up, the patient appeared very somnolent. The patient was able to move bilateral lower extremities and squeeze with bilateral hands.   The patient's pupils are equal, round, and reactive.  Extraocular movements are intact.  A stat EKG was done.  It did not show any specific arrhythmia.  Given the patient's persistent headache despite received multi-drugs treatment, the patient will be transferred to [facility] for further evaluation. Also the patient has had fever of 38.7 degree Celsius in the early morning and the patient was complaining of some coughs.  Chest xray done which shows no consolidation and urine analysis ordered and it is pending.  Please note that the patient's fever has resolved.    Therefore, she is discharged in fair and stable condition with close outpatient " follow-up.    FOLLOW-UP  Neurology consult needed.    DISCHARGE PROBLEM LIST  Principal Problem:    Migraine POA: Yes  Active Problems:    Cerebral aneurysm without rupture POA: Yes    Hypokalemia POA: Unknown    Hyperlipidemia with target LDL less than 160 POA: Yes      Overview: ICD-10 transition  Resolved Problems:    Migraine headache POA: Yes    Leukopenia POA: Unknown      FOLLOW UP  No future appointments.  No follow-up provider specified.    MEDICATIONS ON DISCHARGE   Sharlene Garcia   Home Medication Instructions NAVEEN:[id number]    Printed on:09/19/17 5631   Medication Information                      acetaminophen (TYLENOL) 325 MG Tab  Take 2 Tabs by mouth every four hours as needed for Fever or Mild Pain.             acetaminophen/caffeine/butalbital 325-40-50 mg (FIORICET) -40 MG Tab  Take 1 Tab by mouth every four hours as needed for Headache.             alprazolam (XANAX) 0.25 MG Tab  Take 2 Tabs by mouth at bedtime as needed for Sleep.             atorvastatin (LIPITOR) 20 MG Tab  Take 1 Tab by mouth every bedtime.             cetirizine (ZYRTEC) 10 MG Tab  Take 1 Tab by mouth every day.             diphenhydrAMINE (BENADRYL) 25 MG Tab  Take 1 tablet by mouth at bedtime as needed.  May repeat 1 time. (insomnia).             enoxaparin (LOVENOX) 40 MG/0.4ML Solution inj  Inject 40 mg as instructed every day.             hydrocodone-acetaminophen (NORCO) 7.5-325 MG per tablet  Take 1-2 Tabs by mouth every four hours as needed for Moderate Pain.             naproxen (NAPROSYN) 500 MG Tab  Take 1 Tab by mouth 2 Times a Day.             ondansetron (ZOFRAN ODT) 4 MG TABLET DISPERSIBLE  Take 1 Tab by mouth every four hours as needed for Nausea/Vomiting (give PO if no IV route available).             potassium chloride SA (KDUR) 20 MEQ Tab CR  Take 1 Tab by mouth every day.             topiramate (TOPAMAX) 50 MG tablet  Take 1 Tab by mouth 2 Times a Day.                 DIET  Orders Placed This Encounter    Procedures   • Diet Order     Standing Status:   Standing     Number of Occurrences:   1     Order Specific Question:   Diet:     Answer:   Regular [1]       ACTIVITY  As tolerated. fall precaution  Sitter monitoring.      CONSULTATIONS  none    PROCEDURES  None    Vitals:    09/19/17 1226 09/19/17 1231 09/19/17 1236 09/19/17 1431   BP: 117/70 116/71 125/79 108/66   Pulse: 63 65 61 72   Resp:    16   Temp:    36.8 °C (98.3 °F)   SpO2: 93% 94% 95% 95%   Weight:       Height:         Physical Exam   Constitutional: She is oriented to person and place. She appears somnolent.  HENT:   Head: Normocephalic and atraumatic.   Right Ear: External ear normal.   Left Ear: External ear normal.   Nose: Nose normal.   Mouth/Throat: No oropharyngeal exudate.   Eyes: Conjunctivae and EOM are normal. Pupils are equal, round, and reactive to light.   Neck: Normal range of motion. Neck supple.   Cardiovascular: Normal heart sounds.  Exam reveals no gallop and no friction rub.    No murmur heard.  Pulmonary/Chest: Effort normal and breath sounds normal.   Abdominal: Soft. Bowel sounds are normal.   Musculoskeletal: Move bilateral lower extremities and squeeze with bilateral hands..   Neurological: She is alert and oriented to person and place.  Skin: Skin is warm and dry.   Nursing note and vitals reviewed.    LABORATORY  Lab Results   Component Value Date/Time    SODIUM 139 09/19/2017 12:01 PM    POTASSIUM 3.5 (L) 09/19/2017 12:01 PM    CHLORIDE 111 09/19/2017 12:01 PM    CO2 19 (L) 09/19/2017 12:01 PM    GLUCOSE 111 (H) 09/19/2017 12:01 PM    BUN 17 09/19/2017 12:01 PM    CREATININE 0.98 09/19/2017 12:01 PM        Lab Results   Component Value Date/Time    WBC 9.0 09/19/2017 12:01 PM    HEMOGLOBIN 12.9 09/19/2017 12:01 PM    HEMATOCRIT 38.0 09/19/2017 12:01 PM    PLATELETCT 247 09/19/2017 12:01 PM        Total time of the discharge process exceeds 32 minutes

## 2017-09-19 NOTE — PROGRESS NOTES
Code blue called, arrived to patient room, Dr. Monroy at bedside, pt on floor, awake and speaking with staff. Patient's attending Dr. Rene at bedside. Pt assisted back to bed by staff. CXR, CT head, UA, EKG, and labs ordered. Neuro assessment completed by Dr. Rene. Patient 's/70's, O2 sats > 93% on room air. No orders to transfer at this time.

## 2017-09-19 NOTE — PROGRESS NOTES
Direct admit from [facility]. Dr. Rene accepting for syncope. ADT signed and held by Dr. Rene and will need to be released upon patient arrival to unit. Patient arriving via ground transport.

## 2017-09-19 NOTE — PROGRESS NOTES
Patient was c/o being cold and noted patient is shivering. Denies of hx of seizures. Temp WNL. Updated Marianela BRANTLEY of this. No new order. Will monitor patient closely. Also, provided with warm blankets.

## 2017-09-19 NOTE — PROGRESS NOTES
BS report received. Patient in bed, alert and oriented, family at BS. No c/o pain, nausea or sob at this time. POC discussed, verbalized understanding. Bed low and locked, bed alarm on. Call light and belonging within reach and demonstrated use of call light. Fall precaution in effect. Hourly rounding in place.

## 2017-09-19 NOTE — RESPIRATORY CARE
Code Blue    Called for code blue to the room where apparently the patient had fallen off the toilet.  Patient starting to follow questions with saturations 96% on RA.  Two MD's and two RN's at the bedside.

## 2017-09-19 NOTE — PROGRESS NOTES
Report received from JENNIE Han. POC discussed, Pt resting comfortably in bed, sitter present at bedside.

## 2017-09-19 NOTE — DISCHARGE PLANNING
Pt discussed at IDT rounds.  Dr. Rene requesting transfer to [facility] for neurology consult.  ROSCOE called transfer center to request a bed assignment but transfer center needs d.c and re-admit orders.  ROSCOE preparing PCS form but needs to know if pt needs tele or medical bed.  ROSCOE called RN Clerk Mcbride requesting Dr. Rene call ROSCOE after he has finished rounds.

## 2017-09-19 NOTE — DISCHARGE PLANNING
ROSCOE Matthews receives a call from transfer center with bed assignment ([facility] [address] bed 1) and the bed is clean and ready.  ROSCOE calls [facility] Katty requesting she set up [facility] transport.  ROSCOE gave bs RN Bhupendra the COBRA and bs RN Gibran speaks to pt's , Maciel who had a lot of questions when ROSCOE called to let him know that the pt is being transferred.  ROSCOE will notify bs RN's once ETA is given from [facility].      REMSA  1950.  ROSCOE informed jorge Leary.

## 2017-09-19 NOTE — PROGRESS NOTES
Bed alarm sounded. CNA ran to room. Patient was on bedside commode. CNA at patient's side and reports patient passed out and was assisted to the floor. CNA calling for help. Ambika RN, responds patient unresponsive. Code blue called. Orders for CT of head, chest x-ray, EKG, and labs. Patient assisted back to bed. Responding to questions. Vitals signs stable. Small bump noted to the head. No other signs of injury. Patient continues to be lethargic but responds appropriately to questions.

## 2017-09-19 NOTE — PROGRESS NOTES
8013  Bed Alarm went off. Found patient standing by the bathroom room. Assisted with NIRU Chapin and this RN to the toilet. Patient instructed not get up on her own while CNA told this RN to get a new gown. Few seconds later, CNA heard a stump sound, found patient on her knees, did not hit the head. Assist by this RN and CNA to bed via wheelchair. VSS and no injury noted to knees. Bed alarm on.     3363  Marianela BRANTLEY updated. No new order at this time. Will continue to monitor patient.

## 2017-09-19 NOTE — DISCHARGE PLANNING
Medical Social Work      Referral: ROSCOE reviewed the chart this AM.      Intervention: Per flowsheet, pt lives with spouse and expects to d.c home.  No SS or DC needs at this time.      Plan: ROSCOE Matthews available to assist with any d.c planning.      Care Transition Team Assessment    Information Source  Orientation : Oriented x 4  Information Given By: Patient    Readmission Evaluation  Is this a readmission?: No    Elopement Risk  Legal Hold: No  Ambulatory or Self Mobile in Wheelchair: No-Not an Elopement Risk  Elopement Risk: Not at Risk for Elopement    Interdisciplinary Discharge Planning  Does Admitting Nurse Feel This Could be a Complex Discharge?: No  Lives with - Patient's Self Care Capacity: Spouse, Child Less than 18 Years of Age  Patient or legal guardian wants to designate a caregiver (see row info): No  Support Systems: Family Member(s), Spouse / Significant Other  Housing / Facility: [address]  Do You Take your Prescribed Medications Regularly: Yes  Able to Return to Previous ADL's: Yes  Mobility Issues: No  Prior Services: None  Patient Expects to be Discharged to:: Home  Assistance Needed: No  Durable Medical Equipment: Not Applicable    Discharge Preparedness  What is your plan after discharge?: Uncertain - pending medical team collaboration  What are your discharge supports?: Spouse         Finances  Prescription Coverage: Yes    Vision / Hearing Impairment  Vision Impairment : Yes  Right Eye Vision: Impaired, Wears Glasses  Left Eye Vision: Impaired, Wears Glasses  Hearing Impairment : No    Values / Beliefs / Concerns  Values / Beliefs Concerns : No    Advance Directive  Advance Directive?: None    Domestic Abuse  Have you ever been the victim of abuse or violence?: No    Psychological Assessment  History of Substance Abuse: None

## 2017-09-19 NOTE — PROGRESS NOTES
Patient was on bedside commode. Passed out and fell. Assisted back to bed vital signs stable no signs of injury. MD and  notified.

## 2017-09-19 NOTE — PROGRESS NOTES
0603  Paged Marianela MIRANDA to update about the patient's temp 101.7.     0606  Spoke to Marianela MIRANDA. Received order for Ylenol 650 mg Q6 PRN.

## 2017-09-19 NOTE — PROGRESS NOTES
0900Report received, plan of care reviewed and discussed, assessment complete, oriented to room, bed alarm on, nonskid socks applied, advised to call for assistance.   1100 Discussed plan of care, encouraged and answered all questions, complaints of headache and cough, medication administered per MAR.  1300 Complaints of cough and headache worsens during coughing, medication administered per MAR, will continue to monitor.  1500 Resting in bed, family at bedside.  1700 Anxiety medication given for MRI, all needs met at this time.  Report given to next shift.

## 2017-09-19 NOTE — CARE PLAN
Problem: Safety  Goal: Will remain free from falls    Intervention: Implement fall precautions  Room/floor clear. Non skid socks on. Proper signs up. Bed alarm on, bed low and locked. Call light and belonging within reach. Hourly rounding in place to make sure needs are met.       Problem: Infection  Goal: Will remain free from infection    Intervention: Implement standard precautions and perform hand washing before and after patient contact  Hand washing every encounter. IV ports scrubbed with alcohol when hanging medicine. Patient watch for s/s of infection. Patient taught to report s/s of infection, verbalized understanding.       Problem: Venous Thromboembolism (VTW)/Deep Vein Thrombosis (DVT) Prevention:  Goal: Patient will participate in Venous Thrombosis (VTE)/Deep Vein Thrombosis (DVT)Prevention Measures    Intervention: Encourage patient to perform ankle flex, foot rotation, and knee flex exercises in addition to other prophylatic measures every hour while awake  Encourage to perform flexion of the feet while in bed and awake, verbalized understanding.       Problem: Pain Management  Goal: Pain level will decrease to patient's comfort goal    Intervention: Follow pain managment plan developed in collaboration with patient and Interdisciplinary Team  Pain assessment Q4H or Q2H after medication intervention. Encouraged patient to report pain, verbalized understanding. Medicated PRN.

## 2017-09-20 ENCOUNTER — APPOINTMENT (OUTPATIENT)
Dept: RADIOLOGY | Facility: MEDICAL CENTER | Age: 57
End: 2017-09-20
Attending: INTERNAL MEDICINE
Payer: COMMERCIAL

## 2017-09-20 PROBLEM — R73.9 HYPERGLYCEMIA: Status: ACTIVE | Noted: 2017-09-20

## 2017-09-20 LAB
ALBUMIN SERPL BCP-MCNC: 3.7 G/DL (ref 3.2–4.9)
ALBUMIN/GLOB SERPL: 1.5 G/DL
ALP SERPL-CCNC: 81 U/L (ref 30–99)
ALT SERPL-CCNC: 61 U/L (ref 2–50)
ANION GAP SERPL CALC-SCNC: 9 MMOL/L (ref 0–11.9)
APPEARANCE UR: CLEAR
AST SERPL-CCNC: 18 U/L (ref 12–45)
BACTERIA #/AREA URNS HPF: ABNORMAL /HPF
BASOPHILS # BLD AUTO: 0.4 % (ref 0–1.8)
BASOPHILS # BLD: 0.02 K/UL (ref 0–0.12)
BILIRUB SERPL-MCNC: 0.3 MG/DL (ref 0.1–1.5)
BILIRUB UR QL STRIP.AUTO: NEGATIVE
BUN SERPL-MCNC: 17 MG/DL (ref 8–22)
CALCIUM SERPL-MCNC: 8.2 MG/DL (ref 8.5–10.5)
CHLORIDE SERPL-SCNC: 111 MMOL/L (ref 96–112)
CO2 SERPL-SCNC: 20 MMOL/L (ref 20–33)
COLOR UR: YELLOW
CREAT SERPL-MCNC: 0.74 MG/DL (ref 0.5–1.4)
EOSINOPHIL # BLD AUTO: 0.1 K/UL (ref 0–0.51)
EOSINOPHIL NFR BLD: 1.8 % (ref 0–6.9)
EPI CELLS #/AREA URNS HPF: ABNORMAL /HPF
ERYTHROCYTE [DISTWIDTH] IN BLOOD BY AUTOMATED COUNT: 47.4 FL (ref 35.9–50)
ERYTHROCYTE [SEDIMENTATION RATE] IN BLOOD BY WESTERGREN METHOD: 11 MM/HOUR (ref 0–30)
GFR SERPL CREATININE-BSD FRML MDRD: >60 ML/MIN/1.73 M 2
GLOBULIN SER CALC-MCNC: 2.4 G/DL (ref 1.9–3.5)
GLUCOSE SERPL-MCNC: 102 MG/DL (ref 65–99)
GLUCOSE UR STRIP.AUTO-MCNC: NEGATIVE MG/DL
HCT VFR BLD AUTO: 36.4 % (ref 37–47)
HGB BLD-MCNC: 12.1 G/DL (ref 12–16)
HYALINE CASTS #/AREA URNS LPF: ABNORMAL /LPF
IMM GRANULOCYTES # BLD AUTO: 0.01 K/UL (ref 0–0.11)
IMM GRANULOCYTES NFR BLD AUTO: 0.2 % (ref 0–0.9)
KETONES UR STRIP.AUTO-MCNC: NEGATIVE MG/DL
LEUKOCYTE ESTERASE UR QL STRIP.AUTO: ABNORMAL
LV EJECT FRACT  99904: 65
LV EJECT FRACT MOD 2C 99903: 80.73
LV EJECT FRACT MOD 4C 99902: 78.45
LV EJECT FRACT MOD BP 99901: 77.89
LYMPHOCYTES # BLD AUTO: 2.97 K/UL (ref 1–4.8)
LYMPHOCYTES NFR BLD: 53.7 % (ref 22–41)
MAGNESIUM SERPL-MCNC: 1.9 MG/DL (ref 1.5–2.5)
MCH RBC QN AUTO: 30.2 PG (ref 27–33)
MCHC RBC AUTO-ENTMCNC: 33.2 G/DL (ref 33.6–35)
MCV RBC AUTO: 90.8 FL (ref 81.4–97.8)
MICRO URNS: ABNORMAL
MONOCYTES # BLD AUTO: 0.3 K/UL (ref 0–0.85)
MONOCYTES NFR BLD AUTO: 5.4 % (ref 0–13.4)
NEUTROPHILS # BLD AUTO: 2.13 K/UL (ref 2–7.15)
NEUTROPHILS NFR BLD: 38.5 % (ref 44–72)
NITRITE UR QL STRIP.AUTO: NEGATIVE
NRBC # BLD AUTO: 0 K/UL
NRBC BLD AUTO-RTO: 0 /100 WBC
PH UR STRIP.AUTO: 5 [PH]
PLATELET # BLD AUTO: 238 K/UL (ref 164–446)
PMV BLD AUTO: 11.4 FL (ref 9–12.9)
POTASSIUM SERPL-SCNC: 3.5 MMOL/L (ref 3.6–5.5)
PROT SERPL-MCNC: 6.1 G/DL (ref 6–8.2)
PROT UR QL STRIP: NEGATIVE MG/DL
RBC # BLD AUTO: 4.01 M/UL (ref 4.2–5.4)
RBC # URNS HPF: ABNORMAL /HPF
RBC UR QL AUTO: NEGATIVE
SODIUM SERPL-SCNC: 140 MMOL/L (ref 135–145)
SP GR UR STRIP.AUTO: 1.02
TROPONIN I SERPL-MCNC: <0.01 NG/ML (ref 0–0.04)
UROBILINOGEN UR STRIP.AUTO-MCNC: 0.2 MG/DL
WBC # BLD AUTO: 5.5 K/UL (ref 4.8–10.8)
WBC #/AREA URNS HPF: ABNORMAL /HPF

## 2017-09-20 PROCEDURE — 85652 RBC SED RATE AUTOMATED: CPT

## 2017-09-20 PROCEDURE — A9270 NON-COVERED ITEM OR SERVICE: HCPCS | Performed by: INTERNAL MEDICINE

## 2017-09-20 PROCEDURE — 700111 HCHG RX REV CODE 636 W/ 250 OVERRIDE (IP): Performed by: INTERNAL MEDICINE

## 2017-09-20 PROCEDURE — 81001 URINALYSIS AUTO W/SCOPE: CPT

## 2017-09-20 PROCEDURE — 700102 HCHG RX REV CODE 250 W/ 637 OVERRIDE(OP): Performed by: INTERNAL MEDICINE

## 2017-09-20 PROCEDURE — 93306 TTE W/DOPPLER COMPLETE: CPT

## 2017-09-20 PROCEDURE — 84484 ASSAY OF TROPONIN QUANT: CPT

## 2017-09-20 PROCEDURE — 80053 COMPREHEN METABOLIC PANEL: CPT

## 2017-09-20 PROCEDURE — 36415 COLL VENOUS BLD VENIPUNCTURE: CPT

## 2017-09-20 PROCEDURE — 85025 COMPLETE CBC W/AUTO DIFF WBC: CPT

## 2017-09-20 PROCEDURE — 99226 PR SUBSEQUENT OBSERVATION CARE,LEVEL III: CPT | Performed by: INTERNAL MEDICINE

## 2017-09-20 PROCEDURE — 93306 TTE W/DOPPLER COMPLETE: CPT | Mod: 26 | Performed by: INTERNAL MEDICINE

## 2017-09-20 PROCEDURE — 83735 ASSAY OF MAGNESIUM: CPT

## 2017-09-20 PROCEDURE — G0378 HOSPITAL OBSERVATION PER HR: HCPCS

## 2017-09-20 PROCEDURE — 700101 HCHG RX REV CODE 250: Performed by: INTERNAL MEDICINE

## 2017-09-20 RX ORDER — IBUPROFEN 800 MG/1
400 TABLET ORAL EVERY 6 HOURS PRN
Status: DISCONTINUED | OUTPATIENT
Start: 2017-09-20 | End: 2017-09-21 | Stop reason: HOSPADM

## 2017-09-20 RX ORDER — TOPIRAMATE 100 MG/1
100 TABLET, FILM COATED ORAL 2 TIMES DAILY
Status: DISCONTINUED | OUTPATIENT
Start: 2017-09-20 | End: 2017-09-21 | Stop reason: HOSPADM

## 2017-09-20 RX ORDER — LORAZEPAM 1 MG/1
1 TABLET ORAL
Status: COMPLETED | OUTPATIENT
Start: 2017-09-20 | End: 2017-09-20

## 2017-09-20 RX ADMIN — GUAIFENESIN 200 MG: 100 SOLUTION ORAL at 02:45

## 2017-09-20 RX ADMIN — GUAIFENESIN 200 MG: 100 SOLUTION ORAL at 14:54

## 2017-09-20 RX ADMIN — ALPRAZOLAM 1 MG: 0.5 TABLET ORAL at 20:24

## 2017-09-20 RX ADMIN — CETIRIZINE HYDROCHLORIDE 10 MG: 10 TABLET, FILM COATED ORAL at 09:32

## 2017-09-20 RX ADMIN — LORAZEPAM 0.5 MG: 1 TABLET ORAL at 22:14

## 2017-09-20 RX ADMIN — TOPIRAMATE 50 MG: 25 TABLET, FILM COATED ORAL at 09:32

## 2017-09-20 RX ADMIN — FLUTICASONE PROPIONATE 100 MCG: 50 SPRAY, METERED NASAL at 09:33

## 2017-09-20 RX ADMIN — ATORVASTATIN CALCIUM 20 MG: 20 TABLET, FILM COATED ORAL at 20:24

## 2017-09-20 RX ADMIN — NAPROXEN 500 MG: 500 TABLET ORAL at 09:32

## 2017-09-20 RX ADMIN — POTASSIUM CHLORIDE 20 MEQ: 1500 TABLET, EXTENDED RELEASE ORAL at 09:35

## 2017-09-20 RX ADMIN — ENOXAPARIN SODIUM 40 MG: 100 INJECTION SUBCUTANEOUS at 09:33

## 2017-09-20 RX ADMIN — HYDROCODONE BITARTRATE AND ACETAMINOPHEN 1 TABLET: 7.5; 325 TABLET ORAL at 09:32

## 2017-09-20 RX ADMIN — HYDROCODONE BITARTRATE AND ACETAMINOPHEN 2 TABLET: 7.5; 325 TABLET ORAL at 02:44

## 2017-09-20 RX ADMIN — TOPIRAMATE 100 MG: 100 TABLET ORAL at 20:24

## 2017-09-20 RX ADMIN — GUAIFENESIN 200 MG: 100 SOLUTION ORAL at 23:34

## 2017-09-20 RX ADMIN — STANDARDIZED SENNA CONCENTRATE AND DOCUSATE SODIUM 2 TABLET: 8.6; 5 TABLET, FILM COATED ORAL at 20:25

## 2017-09-20 ASSESSMENT — ENCOUNTER SYMPTOMS
CONSTIPATION: 0
FEVER: 0
DIZZINESS: 1
PND: 0
PALPITATIONS: 0
TINGLING: 0
DIARRHEA: 0
HEARTBURN: 0
FALLS: 0
BACK PAIN: 0
WHEEZING: 0
BLURRED VISION: 0
SPUTUM PRODUCTION: 0
SHORTNESS OF BREATH: 0
EYE PAIN: 0
COUGH: 1
DIZZINESS: 0
SPEECH CHANGE: 0
COUGH: 0
VOMITING: 1
LOSS OF CONSCIOUSNESS: 1
NECK PAIN: 1
NAUSEA: 0
TREMORS: 0
WEIGHT LOSS: 0
FOCAL WEAKNESS: 0
ABDOMINAL PAIN: 0
WEAKNESS: 0
DEPRESSION: 0
HALLUCINATIONS: 0
SORE THROAT: 0
SEIZURES: 0
CHILLS: 0
PHOTOPHOBIA: 1
VOMITING: 0
NAUSEA: 1
HEADACHES: 1
DOUBLE VISION: 0

## 2017-09-20 ASSESSMENT — PAIN SCALES - GENERAL
PAINLEVEL_OUTOF10: 1
PAINLEVEL_OUTOF10: 2
PAINLEVEL_OUTOF10: 7
PAINLEVEL_OUTOF10: 0
PAINLEVEL_OUTOF10: 1
PAINLEVEL_OUTOF10: 2

## 2017-09-20 ASSESSMENT — LIFESTYLE VARIABLES: SUBSTANCE_ABUSE: 0

## 2017-09-20 NOTE — H&P
DATE OF ADMISSION:  09/19/2017    CHIEF COMPLAINT:  Headache.    HISTORY OF PRESENTING ILLNESS:  The patient is a 57-year-old  female   who has known history of migraine headaches, and patient actually presented to   [facility] on 09/17/2017 with chief complaint of severe headache   that just got worse within 24 hours prior to coming to hospital.  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  Patient does take Topamax at home   and while in [facility], patient received multiple medications   including Topamax, Fioricet, Naproxen and Norco and that has not helped   relieve patient's headache.  Patient was also given IM sumatriptan, and still   patient's headache continued to persist.  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  Please note the   patient was given Xanax prior to going to the MRI study to help patient remain   stable.  After giving the Xanax, patient became more lethargic and somnolent   even after over a 12-hour period.  This morning when I examined the patient in   the hospital, the patient was noted to be very lethargic; in fact code blue   was called as the patient repeatedly passed out twice while she was sitting on   the commode.  When I examined the patient, the patient appears to be very   somnolent and patient states that she does not have any chest pain or any   shortness of breath and she just blacked out.  However, patient is  breathing   spontaneously on her own and patient does not appear to have any obvious   trauma on her head, but CT scan was nonetheless repeated.  CT scan of the   head, the second set, does not show any acute intracranial hemorrhage and no   mass effect, and there was no hydrocephalus.  However, the patient still   reports headache, so given that patient has failed multiple medical therapies   to remove headache, the patient transferred to [facility] for   neurology evaluation.    PAST MEDICAL HISTORY:  According to our records is that of hyperlipidemia,   GERD, menopausal disorder, anxiety, asthma, chronic bronchitis and migraine   headaches.  Patient also had left carotid aneurysm that is status post   coiling.    PAST SURGICAL HISTORY:  Including cholecystectomy, hysteroscopy, ,   mammoplasty, wisdom teeth extraction, bunionectomy x2 and also the left   carotid coiling.    MEDICATIONS:  Please note the patient is on the following medications at home   which include Xanax 0.25 mg 1 tab my mouth at bedtime as needed for sleep, and   patient also on Topamax 25 mg 2 tablets twice daily, Fioricet 1 tablet by   mouth q. 4 hours p.r.n. for headache, patient also has been on Advil 20 mg 1   capsule by mouth as needed for headache, patient also on Benadryl with Tylenol   P.M. to be taken at night for insomnia.  Patient takes some iron supplement   at home, coenzyme Q10, Ginkgo biloba extract.  Patient also takes sumatriptan   100 mg 1 tablet by mouth as needed for migraine, Phenergan 25 mg 1 tablet   every 6 hours for nausea or vomiting and QVAR 80 mcg 1 puff by mouth twice a   day and Zyrtec 10 mg 1 capsule by mouth q. daily.    ALLERGIES:  The patient has no known drug allergies.    CODE STATUS:  Full code.    REVIEW OF SYSTEMS:  Patient's complains of headache.  Also complains of   weakness, but denies chest pain or shortness of breath or abdominal pain.  An   attempt at review of systems was  done; however, the patient was quite   somnolent to answer all my questions, so this was the limitation.    FAMILY HISTORY:  Patient's mother had ovarian cancer,  at 57, and father   had diabetes mellitus.    SOCIAL HISTORY:  The patient never smoked tobacco and patient drinks about   once a week.  Patient denies illicit drug use.    PHYSICAL EXAMINATION:  VITAL SIGNS:  The patient's temperature is 36.8, heart rate of 72, respiratory   rate is 16, blood pressure is 108/66 and oxygen saturation 95% on room air.  GENERAL:  The patient is somnolent and able to wake to commands.  Patient does   follow commands and does not appear to be in any acute distress.  HEENT:  Patient appears to be normocephalic, atraumatic.  Pupils are equal,   round and reactive to light.  There is no vertical nystagmus or horizontal   nystagmus.  There is no discharge from eyes, ears, nose or throat, ___ dry,   throat erythematous.  NECK:  Supple.  No carotid bruits.  No JVD.  CARDIOVASCULAR:  Patient has regular rate and rhythm.  No murmurs, rubs or   gallops.  RESPIRATORY:  Chest is clear to auscultation bilaterally.  There is no   wheezing, rales or rhonchi.  ABDOMEN:  Soft, nontender, nondistended.  Bowel sounds present.  EXTREMITIES:  The patient is able to move bilateral upper extremities and   lower extremities on her own.  She is able to  her bilateral hands, but   due to her drowsiness, she is not moving much.  She is mostly moving her legs   in the horizontal planes, not lifting up against direct gravity.  NEUROLOGIC:  The patient does withdraw from pain and sensation to light touch   is intact in bilateral upper extremities and lower extremities, and there is   no difference to sensation in bilateral face and there does not appear to be   any facial droop.    LABORATORY DATA AND DIAGNOSTIC STUDIES:  The patient's white blood cell count   9, hemoglobin is 12.9, hematocrit 38, MCV of 88, platelet count 247.  Sodium   139,  potassium 3.5, chloride 111, CO2 is 19, glucose is 111, BUN is 17,   creatinine is 0.98.  Calcium is 8.9 and AST is 32.  ALT is 84.  Alkaline   phosphatase is 88.  Total bilirubin is 0.7.  Albumin of 4.  Total protein is   6.5.  Globulin is 2.5.  Troponin first set less than 0.02.  Please note that   patient also had an EKG that was done when the code blue was called.  The EKG   per my interpretation showed the patient has a sinus rhythm with 62 beats per   minutes.  There were no ST elevations or any significant ST depressions.    Patient also had a chest x-ray that was done and the chest x-ray showed that   patient has interstitial prominence likely accentuated by expiratory   technique, and there were no pleural effusions.  Heart is normal in size.    ASSESSMENT:  1.  Syncope x2 episode.  2.  Intractable headache, possibly due to migraine.  3.  Hyperlipidemia.  4.  Gastroesophageal reflux disease.  5.  History of asthma.  6.  Hypokalemia.    PLAN:  In regards to patient's syncopal episode possible etiology including   vasovagal versus orthostatic or other unknown cause.  Patient already had MRI   imaging done and that showed no acute abnormality, but we need to check an   echocardiogram to assess patient's cardiac function.  I will put patient on   telemetry monitoring.  In regards to patient's intractable headache, patient   has been given multiple medications, but not effective, and patient will   continue on Fioricet and Norco and also will continue on Topamax.  I have   already consulted neurologist, Dr. Sullivan, who agreed to see the patient.    Also, patient will continue on neuro check and patient should be on fall   precautions as per the recommendation per neurology.  In regards to patient's   hypokalemia, we will continue to give patient potassium supplement.  We will   check patient's magnesium level.  In regards to patient's hyperlipidemia,   patient will be resumed on Lipitor.  Please note the patient  also had fever   which was earlier in the day around 4 a.m., and there was a documentation of   fever of 38.7 with heart rate of 101, but that has quickly resolved.  Chest   x-ray does not reveal any consolidation and urinalysis is currently pending.    I have also ordered 2 sets of blood culture to be drawn to establish the   etiology of the patient's fever.  We will continue to monitor closely.  If   patient should spike another fever, we will consider initiating antibiotic.       ____________________________________     DO MARGE Dominguez / NTS    DD:  09/19/2017 20:19:08  DT:  09/19/2017 22:32:38    D#:  [id number]  Job#:  [id number]

## 2017-09-20 NOTE — PROGRESS NOTES
Report given to JENNIE Ryan @ [facility] neurosciences, POC discussed, pt being transported via [facility] @ 8880, assigned to [facility], room 180 bed 1 @ [facility].

## 2017-09-20 NOTE — CONSULTS
NEUROLOGY INITIAL NOTE    Referring Physician Dr. Rene     Chief Complaint: intractable headache       History of Present Illness: Sharlene Garcia is a 57 y.o. Female with past medical history of migraine following Dr. Torres, patient   Has history of coiled aneurysm in the MCA in 2015, chronic migraine on Topamax 75 mg BID, on last weekend she started to develop a cough followed by intractable headache every time she cough, patient has photosensitively and prefer dark place, nausea and vomiting during the pain episode, pain described as 10/10 allover the head, increase with cough, failed to improve with ibuprofen, patient went to the hospital has some response after receiving Toradol, benadryl, Narco, pain gradually improved, patient think this episode  similar  to previous severe migraine attacks, no change in the behavior of headache, has some numbness in the upper and lower extremities during the episode, that currently resolved, the patient denies taking Fioricet, and occasionally taking Norco. she denies any fever, chills, neck stiffness.    During hospitalization patient had episode of syncope in the bathroom, not sure if its cardiac source, patient don't remember what happen, awake without confusion, she reported two attacks pf chest and neck pain in July and august.    ROS:   Review of Systems   Constitutional: Negative for chills, fever and weight loss.   HENT: Negative for hearing loss and sore throat.    Eyes: Positive for photophobia. Negative for blurred vision and double vision.   Respiratory: Positive for cough. Negative for shortness of breath.    Cardiovascular: Positive for chest pain. Negative for leg swelling.   Gastrointestinal: Positive for nausea and vomiting.   Genitourinary: Negative for frequency and urgency.   Musculoskeletal: Positive for neck pain.   Skin: Negative for rash.   Neurological: Positive for dizziness, loss of consciousness and headaches. Negative for tingling, tremors, speech  change, focal weakness and seizures.   Psychiatric/Behavioral: Negative for depression, hallucinations and substance abuse.         Past Medical History:   Past Medical History:   Diagnosis Date   • Hyperlipidemia LDL goal < 160 10/28/2010   • GERD (gastroesophageal reflux disease)     Doing better now with weight loss 6/10.   • Menopausal and perimenopausal disorder    • Allergy    • Anxiety     Due to financial issues, never took medication.   • ASTHMA    • Chronic bronchitis     Including multiple episodes of pnemonia, until    • Headache    • Migraine     Rare, many years ago.         Past Surgical History:  Past Surgical History:   Procedure Laterality Date   • RECOVERY  10/25/2015    Procedure: [facility] RECOVERY ONLY;  Surgeon: [facility]Rec[facility] Surgery;  Location: SURGERY [facility];  Service:    • CRISTINA BY LAPAROSCOPY  2011    Performed by LUIS ALBERTO ROBLES at SURGERY [facility]   • HYSTEROSCOPY WITH VIDEO OPERATIVE  2010    Performed by PETER MORALES at SURGERY SAME DAY [facility] ORS   • PB  DELIVERY ONLY     • PRIMARY C SECTION     • MAMMOPLASTY AUGMENTATION      And removal calcium deposits   • OR ORAL SURGERY PROCEDURE      wisdom teeth extraction   • BUNIONECTOMY  1978/1984    x2   • PB ENLARGE BREAST WITH IMPLANT           Current Outpatient Medications:  Home Medications    **Home medications have not yet been reviewed for this encounter**           Medication Allergy/Sensitivities:  Allergies   Allergen Reactions   • Nkda [No Known Drug Allergy]    • Other Environmental          Family History:  Family History   Problem Relation Age of Onset   • Cancer Mother 53     Ovarian  at 57 from it   • Diabetes Father          Social History:  Social History     Social History   • Marital status:      Spouse name: N/A   • Number of children: N/A   • Years of education: N/A     Occupational History   • Not on file.     Social History Main Topics   •  Smoking status: Never Smoker   • Smokeless tobacco: Never Used   • Alcohol use 0.0 - 0.5 oz/week      Comment: once a week   • Drug use: No   • Sexual activity: Yes     Partners: Male     Birth control/ protection: Post-Menopausal      Comment: menopausal since 2005     Other Topics Concern   • Not on file     Social History Narrative   • No narrative on file         Physical Exam     Vitals:    09/20/17 0005 09/20/17 0010 09/20/17 0400 09/20/17 0800   BP: 106/68 106/74 114/67 118/63   Pulse:   67 (!) 57   Resp:   12 15   Temp:   36.3 °C (97.4 °F) 36.9 °C (98.5 °F)   SpO2:   93% 93%   Weight:         Body mass index is 31.85 kg/m².  /63   Pulse (!) 57 Comment: RN notified  Temp 36.9 °C (98.5 °F)   Resp 15   Wt 89.5 kg (197 lb 5 oz)   SpO2 93%   Breastfeeding? No   BMI 31.85 kg/m²   O2 therapy: Pulse Oximetry: 93 %, O2 (LPM): 0, O2 Delivery: None (Room Air)    Physical Exam   Constitutional: No distress.   HENT:   Head: Atraumatic.   Eyes: Pupils are equal, round, and reactive to light.   Neck: No JVD present. No tracheal deviation present.   Cardiovascular: Normal rate.    No murmur heard.  Musculoskeletal: She exhibits no edema or tenderness.   Neurological:   Orientation to time, place, and person were normal.  Recent and remote memory were normal.  Attention span and concentration were normal.  spontaneous speech  Fund of knowledge was normal for age and education.  All cranial nerves were normal: Pupils were equally round and reactive to light.     Motor: (tone, bulk and strength): 5/5 strength in both upper and lower extremity, proximal and distal - no abnormal movement noted.  Patient has normal muscle bulk and tone.  Sensation  was normal  Reflexes were normal and symmetrical in both upper and lower extremities  Coordination finger-to-nose, rapid alternating movements) was normal.    Skin: She is not diaphoretic.         Lab Data Review:  Recent Results (from the past 24 hour(s))   EKG     Collection Time: 17 11:56 AM   Result Value Ref Range    Report       [facility]    Test Date:  2017  Pt Name:    BLANCA AHS [facility]                   Department: MED  MRN:        [id number]                      Room:       3313  Gender:     F                            Technician: YESICA  :        1960                   Requested By:JENNIFER ALICEA  Order #:    [id number]                    Reading MD: Dinh Duggan MD    Measurements  Intervals                                Axis  Rate:       62                           P:          41  MI:         140                          QRS:        -11  QRSD:       98                           T:          25  QT:         424  QTc:        431    Interpretive Statements  SINUS RHYTHM  Compared to ECG 11/10/2011 17:40:01  No significant changes    Electronically Signed On 2017 13:30:09 PDT by Dinh Duggan MD     TROPONIN    Collection Time: 17 12:01 PM   Result Value Ref Range    Troponin I <0.02 0.00 - 0.04 ng/mL   CBC WITH DIFFERENTIAL    Collection Time: 17 12:01 PM   Result Value Ref Range    WBC 9.0 4.8 - 10.8 K/uL    RBC 4.32 4.20 - 5.40 M/uL    Hemoglobin 12.9 12.0 - 16.0 g/dL    Hematocrit 38.0 37.0 - 47.0 %    MCV 88.0 81.4 - 97.8 fL    MCH 29.9 27.0 - 33.0 pg    MCHC 33.9 33.6 - 35.0 g/dL    RDW 45.1 35.9 - 50.0 fL    Platelet Count 247 164 - 446 K/uL    MPV 11.7 9.0 - 12.9 fL    Neutrophils-Polys 44.40 44.00 - 72.00 %    Lymphocytes 48.00 (H) 22.00 - 41.00 %    Monocytes 6.70 0.00 - 13.40 %    Eosinophils 0.40 0.00 - 6.90 %    Basophils 0.40 0.00 - 1.80 %    Immature Granulocytes 0.10 0.00 - 0.90 %    Nucleated RBC 0.00 /100 WBC    Neutrophils (Absolute) 3.96 2.00 - 7.15 K/uL    Lymphs (Absolute) 4.30 1.00 - 4.80 K/uL    Monos (Absolute) 0.60 0.00 - 0.85 K/uL    Eos (Absolute) 0.04 0.00 - 0.51 K/uL    Baso (Absolute) 0.04 0.00 - 0.12 K/uL    Immature Granulocytes (abs) 0.01 0.00 - 0.11 K/uL    NRBC (Absolute) 0.00 K/uL   COMP  METABOLIC PANEL    Collection Time: 09/19/17 12:01 PM   Result Value Ref Range    Sodium 139 135 - 145 mmol/L    Potassium 3.5 (L) 3.6 - 5.5 mmol/L    Chloride 111 96 - 112 mmol/L    Co2 19 (L) 20 - 33 mmol/L    Anion Gap 9.0 0.0 - 11.9    Glucose 111 (H) 65 - 99 mg/dL    Bun 17 8 - 22 mg/dL    Creatinine 0.98 0.50 - 1.40 mg/dL    Calcium 8.9 8.4 - 10.2 mg/dL    AST(SGOT) 32 12 - 45 U/L    ALT(SGPT) 84 (H) 2 - 50 U/L    Alkaline Phosphatase 88 30 - 99 U/L    Total Bilirubin 0.7 0.1 - 1.5 mg/dL    Albumin 4.0 3.2 - 4.9 g/dL    Total Protein 6.5 6.0 - 8.2 g/dL    Globulin 2.5 1.9 - 3.5 g/dL    A-G Ratio 1.6 g/dL   APTT    Collection Time: 09/19/17 12:01 PM   Result Value Ref Range    APTT 27.3 24.7 - 36.0 sec   ESTIMATED GFR    Collection Time: 09/19/17 12:01 PM   Result Value Ref Range    GFR If African American >60 >60 mL/min/1.73 m 2    GFR If Non African American 58 (A) >60 mL/min/1.73 m 2   BLOOD CULTURE    Collection Time: 09/19/17  3:47 PM   Result Value Ref Range    Significant Indicator NEG     Source BLD     Site Peripheral     Blood Culture       No Growth    Note: Blood cultures are incubated for 5 days and  are monitored continuously.Positive blood cultures  are called to the RN and reported as soon as  they are identified.  Blood culture testing and Gram stain, if indicated, are  performed at [facility], [address] [place] [address].  Positive blood cultures are  sent to [facility], [address] [place] [address], for organism identification and  susceptibility testing.     BLOOD CULTURE    Collection Time: 09/19/17  3:47 PM   Result Value Ref Range    Significant Indicator NEG     Source BLD     Site Peripheral     Blood Culture       No Growth    Note: Blood cultures are incubated for 5 days and  are monitored continuously.Positive blood cultures  are called to the RN and reported as soon as  they are identified.  Blood culture testing and Gram  stain, if indicated, are  performed at [facility], [address] [place] [address].  Positive blood cultures are  sent to [facility], [address] [place] [address], for organism identification and  susceptibility testing.     TSH    Collection Time: 09/19/17  8:39 PM   Result Value Ref Range    TSH 2.490 0.300 - 3.700 uIU/mL   AMMONIA    Collection Time: 09/19/17  8:39 PM   Result Value Ref Range    Ammonia 56 (H) 11 - 45 umol/L   TROPONIN    Collection Time: 09/19/17  8:39 PM   Result Value Ref Range    Troponin I <0.01 0.00 - 0.04 ng/mL   URINALYSIS    Collection Time: 09/20/17  2:00 AM   Result Value Ref Range    Color Yellow     Character Clear     Specific Gravity 1.018 <1.035    Ph 5.0 5.0 - 8.0    Glucose Negative Negative mg/dL    Ketones Negative Negative mg/dL    Protein Negative Negative mg/dL    Bilirubin Negative Negative    Urobilinogen, Urine 0.2 Negative    Nitrite Negative Negative    Leukocyte Esterase Moderate (A) Negative    Occult Blood Negative Negative    Micro Urine Req Microscopic    URINE MICROSCOPIC (W/UA)    Collection Time: 09/20/17  2:00 AM   Result Value Ref Range    WBC 5-10 (A) /hpf    RBC 0-2 /hpf    Bacteria Few (A) None /hpf    Epithelial Cells Few /hpf    Hyaline Cast 0-2 /lpf   TROPONIN    Collection Time: 09/20/17  2:38 AM   Result Value Ref Range    Troponin I <0.01 0.00 - 0.04 ng/mL   CBC WITH DIFFERENTIAL    Collection Time: 09/20/17  2:38 AM   Result Value Ref Range    WBC 5.5 4.8 - 10.8 K/uL    RBC 4.01 (L) 4.20 - 5.40 M/uL    Hemoglobin 12.1 12.0 - 16.0 g/dL    Hematocrit 36.4 (L) 37.0 - 47.0 %    MCV 90.8 81.4 - 97.8 fL    MCH 30.2 27.0 - 33.0 pg    MCHC 33.2 (L) 33.6 - 35.0 g/dL    RDW 47.4 35.9 - 50.0 fL    Platelet Count 238 164 - 446 K/uL    MPV 11.4 9.0 - 12.9 fL    Neutrophils-Polys 38.50 (L) 44.00 - 72.00 %    Lymphocytes 53.70 (H) 22.00 - 41.00 %    Monocytes 5.40 0.00 - 13.40 %    Eosinophils 1.80 0.00 - 6.90 %     Basophils 0.40 0.00 - 1.80 %    Immature Granulocytes 0.20 0.00 - 0.90 %    Nucleated RBC 0.00 /100 WBC    Neutrophils (Absolute) 2.13 2.00 - 7.15 K/uL    Lymphs (Absolute) 2.97 1.00 - 4.80 K/uL    Monos (Absolute) 0.30 0.00 - 0.85 K/uL    Eos (Absolute) 0.10 0.00 - 0.51 K/uL    Baso (Absolute) 0.02 0.00 - 0.12 K/uL    Immature Granulocytes (abs) 0.01 0.00 - 0.11 K/uL    NRBC (Absolute) 0.00 K/uL   COMP METABOLIC PANEL    Collection Time: 09/20/17  2:38 AM   Result Value Ref Range    Sodium 140 135 - 145 mmol/L    Potassium 3.5 (L) 3.6 - 5.5 mmol/L    Chloride 111 96 - 112 mmol/L    Co2 20 20 - 33 mmol/L    Anion Gap 9.0 0.0 - 11.9    Glucose 102 (H) 65 - 99 mg/dL    Bun 17 8 - 22 mg/dL    Creatinine 0.74 0.50 - 1.40 mg/dL    Calcium 8.2 (L) 8.5 - 10.5 mg/dL    AST(SGOT) 18 12 - 45 U/L    ALT(SGPT) 61 (H) 2 - 50 U/L    Alkaline Phosphatase 81 30 - 99 U/L    Total Bilirubin 0.3 0.1 - 1.5 mg/dL    Albumin 3.7 3.2 - 4.9 g/dL    Total Protein 6.1 6.0 - 8.2 g/dL    Globulin 2.4 1.9 - 3.5 g/dL    A-G Ratio 1.5 g/dL   MAGNESIUM    Collection Time: 09/20/17  2:38 AM   Result Value Ref Range    Magnesium 1.9 1.5 - 2.5 mg/dL   ESTIMATED GFR    Collection Time: 09/20/17  2:38 AM   Result Value Ref Range    GFR If African American >60 >60 mL/min/1.73 m 2    GFR If Non African American >60 >60 mL/min/1.73 m 2       Imaging/Procedures Review:    ndependant Imaging Review: Completed  ECHOCARDIOGRAM COMP W/O CONT    (Results Pending)         Assessment/Plan     # Intractable headache, severe migraine attack  - attack was secondary to cough, had similar episode in the past, currently on Topamax 75 mg tid  - CT scan and MRI head showed no acute abnormality   - not taking Fioricet, not overmedicating herself    Plan  - MRA head without contrast was ordered  - increased the dose of Topamax to 100 mg BID  - ESR was ordered  - stopped Fioricet  - consider adequate cough suppression  - OK to give narcotics for acute headache setting,  no narcotics on discharge         Thank you for this interesting consult.

## 2017-09-20 NOTE — PROGRESS NOTES
[facility] Hospitalist Progress Note    Date of Service: 2017    Chief Complaint  57 y.o. female admitted 2017 with headache and migraine    Interval Problem Update   patient was transferred here due to abnormal MRI findings. Patient still has some memory headache. Patient's pain is local, 6-8/10, intermittent and does not radiate to other location, sharp and with some tingling. Can be controlled by pain meds.      Consultants/Specialty  neuro    Disposition  home        Review of Systems   Constitutional: Negative for chills, fever and weight loss.   HENT: Negative for congestion, ear discharge, ear pain, hearing loss and nosebleeds.    Eyes: Negative for blurred vision and pain.   Respiratory: Negative for cough, sputum production, shortness of breath and wheezing.    Cardiovascular: Negative for chest pain, palpitations, leg swelling and PND.   Gastrointestinal: Negative for abdominal pain, constipation, diarrhea, heartburn, nausea and vomiting.   Genitourinary: Negative for dysuria, frequency and hematuria.   Musculoskeletal: Positive for neck pain. Negative for back pain, falls and joint pain.   Skin: Negative for rash.   Neurological: Positive for headaches. Negative for dizziness, tremors, speech change, seizures and weakness.   Psychiatric/Behavioral: Negative for depression, substance abuse and suicidal ideas.      Physical Exam  Laboratory/Imaging   Hemodynamics  Temp (24hrs), Av.1 °C (97 °F), Min:35.7 °C (96.3 °F), Max:36.3 °C (97.4 °F)   Temperature: 36.3 °C (97.4 °F)  Pulse  Av  Min: 66  Max: 68    Blood Pressure: 114/67      Respiratory      Respiration: 12, Pulse Oximetry: 93 %     Work Of Breathing / Effort: Mild  RUL Breath Sounds: Clear, RML Breath Sounds: Clear, RLL Breath Sounds: Clear, CEFERINO Breath Sounds: Clear, LLL Breath Sounds: Clear    Fluids  No intake or output data in the 24 hours ending 17 0721    Nutrition  Orders Placed This Encounter   Procedures   • Diet Order      Standing Status:   Standing     Number of Occurrences:   1     Order Specific Question:   Diet:     Answer:   Regular [1]     Physical Exam   Constitutional: She is oriented to person, place, and time. She appears well-developed and well-nourished.   HENT:   Head: Normocephalic.   Nose: Nose normal.   Mouth/Throat: No oropharyngeal exudate.   Eyes: EOM are normal. Pupils are equal, round, and reactive to light.   Neck: Normal range of motion. Neck supple. No JVD present. No thyromegaly present.   Cardiovascular: Normal rate, regular rhythm and normal heart sounds.  Exam reveals no gallop and no friction rub.    No murmur heard.  Pulmonary/Chest: Effort normal and breath sounds normal. No respiratory distress. She has no wheezes. She has no rales.   Abdominal: Soft. Bowel sounds are normal. She exhibits no distension and no mass. There is no tenderness. There is no rebound and no guarding.   Musculoskeletal: Normal range of motion. She exhibits no edema or tenderness.   Lymphadenopathy:     She has no cervical adenopathy.   Neurological: She is alert and oriented to person, place, and time. No cranial nerve deficit.   Skin: Skin is warm. No rash noted.   Psychiatric: Her behavior is normal.       Recent Labs      09/19/17   0440  09/19/17   1201  09/20/17   0238   WBC  7.5  9.0  5.5   RBC  4.21  4.32  4.01*   HEMOGLOBIN  12.7  12.9  12.1   HEMATOCRIT  38.3  38.0  36.4*   MCV  91.0  88.0  90.8   MCH  30.2  29.9  30.2   MCHC  33.2*  33.9  33.2*   RDW  46.5  45.1  47.4   PLATELETCT  236  247  238   MPV  11.8  11.7  11.4     Recent Labs      09/19/17   0440  09/19/17   1201  09/20/17   0238   SODIUM  138  139  140   POTASSIUM  3.5*  3.5*  3.5*   CHLORIDE  112  111  111   CO2  20  19*  20   GLUCOSE  112*  111*  102*   BUN  9 17  17   CREATININE  0.85  0.98  0.74   CALCIUM  8.2*  8.9  8.2*     Recent Labs      09/19/17   1201   APTT  27.3                  Assessment/Plan     No new Assessment & Plan notes have been  filed under this hospital service since the last note was generated.  Service: Hospital Medicine      Reviewed items::  Labs reviewed, Radiology images reviewed and Medications reviewed  Jung catheter::  No Jung  DVT prophylaxis - mechanical:  SCDs   For complexity-based billing, please refer to the history, exam, and decison making above. In addition, I spent 35 minutes caring for the patient today. More than 50% of the time was spent counseling and coordinating care.    I have discussed with RN and CM and SW and other consultants about patient's plan.

## 2017-09-20 NOTE — PROGRESS NOTES
Winifred Hyatt Fall Risk Assessment:     Last Known Fall: No falls  Mobility: Dizziness/generalized weakness  Medications: Cardiovascular and central nervous system meds  Mental Status/LOC/Awareness: Awake, alert, and oriented to date, place, and person  Toileting Needs: No needs  Volume/Electrolyte Status: No problems  Communication/Sensory: Visual (Glasses)/hearing deficit  Behavior: Appropriate behavior  Winifred Hyatt Fall Risk Total: 6  Fall Risk Level: NO RISK    Universal Fall Precautions:  call light/belongings in reach, bed in low position and locked, wheelchairs and assistive devices out of sight, siderails up x 2, use non-slip footwear, adequate lighting, clutter free and spill free environment, educate on level of risk, educate to call for assistance    Fall Risk Level Interventions:          Patient Specific Interventions:     Medication: review medications with patient and family  Mental Status/LOC/Awareness: reinforce the use of call light  Toileting: provide frquent toileting  Volume/Electrolyte Status: monitor abnormal lab values  Communication/Sensory: update plan of care on whiteboard  Behavioral: administer medication as ordered  Mobility: ensure bed is locked and in lowest position

## 2017-09-21 VITALS
TEMPERATURE: 97 F | HEART RATE: 79 BPM | OXYGEN SATURATION: 97 % | RESPIRATION RATE: 18 BRPM | BODY MASS INDEX: 31.85 KG/M2 | SYSTOLIC BLOOD PRESSURE: 124 MMHG | DIASTOLIC BLOOD PRESSURE: 87 MMHG | WEIGHT: 197.31 LBS

## 2017-09-21 PROBLEM — E87.6 HYPOKALEMIA: Status: RESOLVED | Noted: 2017-09-17 | Resolved: 2017-09-21

## 2017-09-21 LAB
ALBUMIN SERPL BCP-MCNC: 3.5 G/DL (ref 3.2–4.9)
ALBUMIN/GLOB SERPL: 1.3 G/DL
ALP SERPL-CCNC: 80 U/L (ref 30–99)
ALT SERPL-CCNC: 49 U/L (ref 2–50)
ANION GAP SERPL CALC-SCNC: 7 MMOL/L (ref 0–11.9)
AST SERPL-CCNC: 17 U/L (ref 12–45)
BASOPHILS # BLD AUTO: 0.9 % (ref 0–1.8)
BASOPHILS # BLD: 0.04 K/UL (ref 0–0.12)
BILIRUB SERPL-MCNC: 0.3 MG/DL (ref 0.1–1.5)
BUN SERPL-MCNC: 20 MG/DL (ref 8–22)
CALCIUM SERPL-MCNC: 8.6 MG/DL (ref 8.5–10.5)
CHLORIDE SERPL-SCNC: 111 MMOL/L (ref 96–112)
CO2 SERPL-SCNC: 21 MMOL/L (ref 20–33)
CREAT SERPL-MCNC: 0.85 MG/DL (ref 0.5–1.4)
EOSINOPHIL # BLD AUTO: 0.25 K/UL (ref 0–0.51)
EOSINOPHIL NFR BLD: 5.4 % (ref 0–6.9)
ERYTHROCYTE [DISTWIDTH] IN BLOOD BY AUTOMATED COUNT: 47.2 FL (ref 35.9–50)
GFR SERPL CREATININE-BSD FRML MDRD: >60 ML/MIN/1.73 M 2
GLOBULIN SER CALC-MCNC: 2.6 G/DL (ref 1.9–3.5)
GLUCOSE SERPL-MCNC: 93 MG/DL (ref 65–99)
HCT VFR BLD AUTO: 35.3 % (ref 37–47)
HGB BLD-MCNC: 11.7 G/DL (ref 12–16)
IMM GRANULOCYTES # BLD AUTO: 0.02 K/UL (ref 0–0.11)
IMM GRANULOCYTES NFR BLD AUTO: 0.4 % (ref 0–0.9)
LYMPHOCYTES # BLD AUTO: 2.74 K/UL (ref 1–4.8)
LYMPHOCYTES NFR BLD: 58.8 % (ref 22–41)
MCH RBC QN AUTO: 30.2 PG (ref 27–33)
MCHC RBC AUTO-ENTMCNC: 33.1 G/DL (ref 33.6–35)
MCV RBC AUTO: 91.2 FL (ref 81.4–97.8)
MONOCYTES # BLD AUTO: 0.3 K/UL (ref 0–0.85)
MONOCYTES NFR BLD AUTO: 6.4 % (ref 0–13.4)
NEUTROPHILS # BLD AUTO: 1.31 K/UL (ref 2–7.15)
NEUTROPHILS NFR BLD: 28.1 % (ref 44–72)
NRBC # BLD AUTO: 0 K/UL
NRBC BLD AUTO-RTO: 0 /100 WBC
PLATELET # BLD AUTO: 234 K/UL (ref 164–446)
PMV BLD AUTO: 11.8 FL (ref 9–12.9)
POTASSIUM SERPL-SCNC: 3.8 MMOL/L (ref 3.6–5.5)
PROT SERPL-MCNC: 6.1 G/DL (ref 6–8.2)
RBC # BLD AUTO: 3.87 M/UL (ref 4.2–5.4)
SODIUM SERPL-SCNC: 139 MMOL/L (ref 135–145)
WBC # BLD AUTO: 4.7 K/UL (ref 4.8–10.8)

## 2017-09-21 PROCEDURE — 70544 MR ANGIOGRAPHY HEAD W/O DYE: CPT

## 2017-09-21 PROCEDURE — A9270 NON-COVERED ITEM OR SERVICE: HCPCS | Performed by: INTERNAL MEDICINE

## 2017-09-21 PROCEDURE — 700102 HCHG RX REV CODE 250 W/ 637 OVERRIDE(OP): Performed by: INTERNAL MEDICINE

## 2017-09-21 PROCEDURE — 99217 PR OBSERVATION CARE DISCHARGE: CPT | Performed by: INTERNAL MEDICINE

## 2017-09-21 PROCEDURE — 36415 COLL VENOUS BLD VENIPUNCTURE: CPT

## 2017-09-21 PROCEDURE — 700111 HCHG RX REV CODE 636 W/ 250 OVERRIDE (IP): Performed by: INTERNAL MEDICINE

## 2017-09-21 PROCEDURE — 700101 HCHG RX REV CODE 250: Performed by: INTERNAL MEDICINE

## 2017-09-21 PROCEDURE — G0378 HOSPITAL OBSERVATION PER HR: HCPCS

## 2017-09-21 PROCEDURE — 80053 COMPREHEN METABOLIC PANEL: CPT

## 2017-09-21 PROCEDURE — 85025 COMPLETE CBC W/AUTO DIFF WBC: CPT

## 2017-09-21 RX ORDER — HYDROCODONE BITARTRATE AND ACETAMINOPHEN 7.5; 325 MG/1; MG/1
1-2 TABLET ORAL EVERY 4 HOURS PRN
Qty: 5 TAB | Refills: 0 | Status: SHIPPED | OUTPATIENT
Start: 2017-09-21 | End: 2018-04-26

## 2017-09-21 RX ORDER — TOPIRAMATE 100 MG/1
100 TABLET, FILM COATED ORAL 2 TIMES DAILY
Qty: 60 TAB | Refills: 0 | Status: SHIPPED | OUTPATIENT
Start: 2017-09-21 | End: 2018-01-18 | Stop reason: SDUPTHER

## 2017-09-21 RX ADMIN — TOPIRAMATE 100 MG: 100 TABLET ORAL at 08:11

## 2017-09-21 RX ADMIN — POTASSIUM CHLORIDE 20 MEQ: 1500 TABLET, EXTENDED RELEASE ORAL at 08:11

## 2017-09-21 RX ADMIN — BENZONATATE 100 MG: 100 CAPSULE ORAL at 08:20

## 2017-09-21 RX ADMIN — HYDROCODONE BITARTRATE AND ACETAMINOPHEN 2 TABLET: 7.5; 325 TABLET ORAL at 08:11

## 2017-09-21 RX ADMIN — STANDARDIZED SENNA CONCENTRATE AND DOCUSATE SODIUM 2 TABLET: 8.6; 5 TABLET, FILM COATED ORAL at 08:11

## 2017-09-21 RX ADMIN — FLUTICASONE PROPIONATE 100 MCG: 50 SPRAY, METERED NASAL at 08:13

## 2017-09-21 RX ADMIN — CETIRIZINE HYDROCHLORIDE 10 MG: 10 TABLET, FILM COATED ORAL at 08:11

## 2017-09-21 RX ADMIN — ENOXAPARIN SODIUM 40 MG: 100 INJECTION SUBCUTANEOUS at 08:11

## 2017-09-21 ASSESSMENT — LIFESTYLE VARIABLES: EVER_SMOKED: NEVER

## 2017-09-21 ASSESSMENT — PAIN SCALES - GENERAL
PAINLEVEL_OUTOF10: 0
PAINLEVEL_OUTOF10: 0
PAINLEVEL_OUTOF10: 6

## 2017-09-21 NOTE — PROGRESS NOTES
Pt is alert and oriented x4. Complains of a headache brought on due to her coughing per pt, PRN medication given to make pt more comfortable. Pt aware of safety measures in place. Will continue to monitor pt.

## 2017-09-21 NOTE — PROGRESS NOTES
Assumed care of pt from day RN. Pt in bed with frame alarm on. Call light within reach.  Needs met.   MRA completed.  Will monitor, assist and medicate per MAR for duration of shift.  Hourly rounding implemented.

## 2017-09-21 NOTE — PROGRESS NOTES
NEUROLOGY PROGRESS NOTE         Interval history: patient feel tired today, after increasing the dose of topiramate, she has an occipital  a headache in the morning that improve after giving her pain medication, headache was secondary to lying on the bed for a long time and pressure on the occipital nerve, denies nausea or vomiting, patient ok to discharge, she prefer to be discharged tomorrow so her son can come to take her home      ROS:   ROS  Constitutional: Negative for chills, fever and weight loss.   HENT: Negative for hearing loss and sore throat.    Eyes: Negative for blurred vision and double vision.   Respiratory:  Negative for shortness of breath.    Cardiovascular. Negative for leg swelling.   Gastrointestinal:negative for nausea of vomiting   Genitourinary: Negative for frequency and urgency.   Musculoskeletal: Positive for neck pain and occipital pain.   Skin: Negative for rash.   Neurological: Positive for  headaches. Negative for tingling, tremors, speech change, focal weakness and seizures.   Psychiatric/Behavioral: Negative for depression, hallucinations and substance abuse.         Current Outpatient Medications:  Home Medications    **Home medications have not yet been reviewed for this encounter**           Medication Allergy/Sensitivities:  Allergies   Allergen Reactions   • Nkda [No Known Drug Allergy]    • Other Environmental            Physical Exam     Vitals:    09/20/17 2000 09/21/17 0000 09/21/17 0400 09/21/17 0800   BP: 118/71 134/67 134/67 124/87   Pulse: 64 79 61 79   Resp: 12 16 12 18   Temp: 36.5 °C (97.7 °F) 36.4 °C (97.5 °F) 36.7 °C (98.1 °F) 36.1 °C (97 °F)   SpO2: 96% 97% 98% 97%   Weight:         Body mass index is 31.85 kg/m².  /87   Pulse 79   Temp 36.1 °C (97 °F)   Resp 18   Wt 89.5 kg (197 lb 5 oz)   SpO2 97%   Breastfeeding? No   BMI 31.85 kg/m²   O2 therapy: Pulse Oximetry: 97 %, O2 (LPM): 0, O2 Delivery: None (Room Air)    Physical Exam  Head: Atraumatic.    Eyes: Pupils are equal, round, and reactive to light.   Neck: No JVD present. No tracheal deviation present.   Cardiovascular: Normal rate.    No murmur heard.  Musculoskeletal: She exhibits no edema or tenderness.   Neurological:   Orientation to time, place, and person were normal.  Recent and remote memory were normal.  Attention span and concentration were normal.  spontaneous speech  Fund of knowledge was normal for age and education.  All cranial nerves were normal: Pupils were equally round and reactive to light.   Motor: (tone, bulk and strength): 5/5 strength in both upper and lower extremity, proximal and distal - no abnormal movement noted.  Patient has normal muscle bulk and tone.  Sensation  was normal  Reflexes were normal and symmetrical in both upper and lower extremities  Coordination finger-to-nose, rapid alternating movements) was normal.    Skin: She is not diaphoretic.     Lab Data Review:  Recent Results (from the past 24 hour(s))   ECHOCARDIOGRAM COMP W/O CONT    Collection Time: 09/20/17 11:52 AM   Result Value Ref Range    Eject.Frac. MOD BP 77.89     Eject.Frac. MOD 4C 78.45     Eject.Frac. MOD 2C 80.73     Left Ventrical Ejection Fraction 65    CBC WITH DIFFERENTIAL    Collection Time: 09/21/17  3:52 AM   Result Value Ref Range    WBC 4.7 (L) 4.8 - 10.8 K/uL    RBC 3.87 (L) 4.20 - 5.40 M/uL    Hemoglobin 11.7 (L) 12.0 - 16.0 g/dL    Hematocrit 35.3 (L) 37.0 - 47.0 %    MCV 91.2 81.4 - 97.8 fL    MCH 30.2 27.0 - 33.0 pg    MCHC 33.1 (L) 33.6 - 35.0 g/dL    RDW 47.2 35.9 - 50.0 fL    Platelet Count 234 164 - 446 K/uL    MPV 11.8 9.0 - 12.9 fL    Neutrophils-Polys 28.10 (L) 44.00 - 72.00 %    Lymphocytes 58.80 (H) 22.00 - 41.00 %    Monocytes 6.40 0.00 - 13.40 %    Eosinophils 5.40 0.00 - 6.90 %    Basophils 0.90 0.00 - 1.80 %    Immature Granulocytes 0.40 0.00 - 0.90 %    Nucleated RBC 0.00 /100 WBC    Neutrophils (Absolute) 1.31 (L) 2.00 - 7.15 K/uL    Lymphs (Absolute) 2.74 1.00 - 4.80  K/uL    Monos (Absolute) 0.30 0.00 - 0.85 K/uL    Eos (Absolute) 0.25 0.00 - 0.51 K/uL    Baso (Absolute) 0.04 0.00 - 0.12 K/uL    Immature Granulocytes (abs) 0.02 0.00 - 0.11 K/uL    NRBC (Absolute) 0.00 K/uL   COMP METABOLIC PANEL    Collection Time: 09/21/17  3:52 AM   Result Value Ref Range    Sodium 139 135 - 145 mmol/L    Potassium 3.8 3.6 - 5.5 mmol/L    Chloride 111 96 - 112 mmol/L    Co2 21 20 - 33 mmol/L    Anion Gap 7.0 0.0 - 11.9    Glucose 93 65 - 99 mg/dL    Bun 20 8 - 22 mg/dL    Creatinine 0.85 0.50 - 1.40 mg/dL    Calcium 8.6 8.5 - 10.5 mg/dL    AST(SGOT) 17 12 - 45 U/L    ALT(SGPT) 49 2 - 50 U/L    Alkaline Phosphatase 80 30 - 99 U/L    Total Bilirubin 0.3 0.1 - 1.5 mg/dL    Albumin 3.5 3.2 - 4.9 g/dL    Total Protein 6.1 6.0 - 8.2 g/dL    Globulin 2.6 1.9 - 3.5 g/dL    A-G Ratio 1.3 g/dL   ESTIMATED GFR    Collection Time: 09/21/17  3:52 AM   Result Value Ref Range    GFR If African American >60 >60 mL/min/1.73 m 2    GFR If Non African American >60 >60 mL/min/1.73 m 2       Imaging/Procedures Review:    ndependant Imaging Review: Completed  MR-MRA HEAD-W/O   Final Result      Stable residual aneurysm at the left supraclinoid carotid bifurcation.      ECHOCARDIOGRAM COMP W/O CONT   Final Result          Assessment/Plan     # Intractable headache, severe migraine attack  - attack was secondary to cough, had similar episode in the past, currently on Topamax 75 mg tid  - CT scan and MRI head showed no acute abnormality   - not taking Fioricet, not overmedicating herself  - MRA head without contrast was reviewed,  - patient feel more lethargic after we increased the dose of Topamax to 100 mg BID  - ESR was normal    Plan  - OK to discharge the patient from neurology stand point  - continue Topamax to 100 mg BID  - avoid Fioricet on discharge  - OK to give narcotics for acute headache setting, no narcotics on discharge

## 2017-09-21 NOTE — DISCHARGE SUMMARY
Hospital Medicine Discharge Note     Admit Date:  9/19/2017       Discharge Date:   9/21/2017    Attending Physician:  Petrona Barillas     Diagnoses (includes active and resolved):   Principal Problem:    Migraine POA: Yes  Active Problems:    Cerebral aneurysm without rupture POA: Yes    Elevated liver enzymes POA: Yes    Hyperglycemia POA: Unknown  Resolved Problems:    Hypokalemia POA: Yes        Chief Complain  Migraine headache    Hosiery of Present Illness  Patient was admitted for medical headache and abnormal MRI findings.   Detailed info pls see H&P by Dr. Mai    [facility] Summary (Brief Narrative):       Patient was admitted to the hospital for migraine headache. MRI showing of normal venous finding. Patient was then transferred to this hospital for neurology consult. Neurologist was consulted and recommended MRA. MRI did show residual stable aneurysm without significant changes. Patient is headache slightly improved after medication. Patient currently stable and will be discharged home and follow-up as outpatient.     Consultants:      neurologist    Procedures:        none    Discharge Medications:        (x)  Medication Reconciliation Completed       Medication List      CHANGE how you take these medications      Instructions   * topiramate 50 MG tablet  What changed:  Another medication with the same name was added. Make sure you understand how and when to take each.  Commonly known as:  TOPAMAX   Take 1 Tab by mouth 2 Times a Day.  Dose:  50 mg     * topiramate 100 MG Tabs  What changed:  You were already taking a medication with the same name, and this prescription was added. Make sure you understand how and when to take each.  Commonly known as:  TOPAMAX   Take 1 Tab by mouth 2 Times a Day.  Dose:  100 mg        * This list has 2 medication(s) that are the same as other medications prescribed for you. Read the directions carefully, and ask your doctor or other care provider to review them with you.             CONTINUE taking these medications      Instructions   acetaminophen 325 MG Tabs  Commonly known as:  TYLENOL   Take 2 Tabs by mouth every four hours as needed for Fever or Mild Pain.  Dose:  650 mg     acetaminophen/caffeine/butalbital 325-40-50 mg -40 MG Tabs  Commonly known as:  FIORICET   Take 1 Tab by mouth every four hours as needed for Headache.  Dose:  1 Tab     alprazolam 0.25 MG Tabs  Commonly known as:  XANAX   Doctor's comments:  Si at hs prn sleep  Take 2 Tabs by mouth at bedtime as needed for Sleep.  Dose:  0.5 mg     atorvastatin 20 MG Tabs  Commonly known as:  LIPITOR   Take 1 Tab by mouth every bedtime.  Dose:  20 mg     cetirizine 10 MG Tabs  Commonly known as:  ZYRTEC   Take 1 Tab by mouth every day.  Dose:  10 mg     diphenhydrAMINE 25 MG Tabs  Commonly known as:  BENADRYL   Take 1 tablet by mouth at bedtime as needed.  May repeat 1 time. (insomnia).  Dose:  25 mg     enoxaparin 40 MG/0.4ML Soln inj  Commonly known as:  LOVENOX   Inject 40 mg as instructed every day.  Dose:  40 mg     hydrocodone-acetaminophen 7.5-325 MG per tablet  Commonly known as:  NORCO   Take 1-2 Tabs by mouth every four hours as needed for Moderate Pain.  Dose:  1-2 Tab     naproxen 500 MG Tabs  Commonly known as:  NAPROSYN   Take 1 Tab by mouth 2 Times a Day.  Dose:  500 mg     ondansetron 4 MG Tbdp  Commonly known as:  ZOFRAN ODT   Take 1 Tab by mouth every four hours as needed for Nausea/Vomiting (give PO if no IV route available).  Dose:  4 mg     potassium chloride SA 20 MEQ Tbcr  Commonly known as:  Kdur   Take 1 Tab by mouth every day.  Dose:  20 mEq            Disposition:   Discharge home    Diet:   Regular    Activity:   As tolerated    Code status:   Full code    Primary Care Provider:    Alberto Gandara M.D.    Follow up appointment details :      PCP in 2 weeks  No follow-up provider specified.  No future appointments.    Pending Studies:        None    Time spent on discharge day  patient visit: >35 minutes    #################################################    Interval history/exam for day of discharge:    Vitals:    09/20/17 2000 09/21/17 0000 09/21/17 0400 09/21/17 0800   BP: 118/71 134/67 134/67 124/87   Pulse: 64 79 61 79   Resp: 12 16 12 18   Temp: 36.5 °C (97.7 °F) 36.4 °C (97.5 °F) 36.7 °C (98.1 °F) 36.1 °C (97 °F)   SpO2: 96% 97% 98% 97%   Weight:         Weight/BMI: Body mass index is 31.85 kg/m².  Pulse Oximetry: 97 %, O2 (LPM): 0, O2 Delivery: None (Room Air)    Gen: AAOx3, NAD  Eyes: PELLA  Neck: no JVD, no lymphadenopathy  Cardia: RRR, no mrg  Lungs: CTAB, no rales, rhonci or wheezing  Abd: NABS, soft, non extended, no mass  EXT: No C/C/E, peripheral pulse 2+ b/l  Neuro: CN II-XII intact, non focal, reflex 2+ symmetrical  Skin: Intact, no lesion, warm  Psych: Appropriate.    Most Recent Labs:    Lab Results   Component Value Date/Time    WBC 4.7 (L) 09/21/2017 03:52 AM    RBC 3.87 (L) 09/21/2017 03:52 AM    HEMOGLOBIN 11.7 (L) 09/21/2017 03:52 AM    HEMATOCRIT 35.3 (L) 09/21/2017 03:52 AM    MCV 91.2 09/21/2017 03:52 AM    MCH 30.2 09/21/2017 03:52 AM    MCHC 33.1 (L) 09/21/2017 03:52 AM    MPV 11.8 09/21/2017 03:52 AM    NEUTSPOLYS 28.10 (L) 09/21/2017 03:52 AM    LYMPHOCYTES 58.80 (H) 09/21/2017 03:52 AM    MONOCYTES 6.40 09/21/2017 03:52 AM    EOSINOPHILS 5.40 09/21/2017 03:52 AM    BASOPHILS 0.90 09/21/2017 03:52 AM    HYPOCHROMIA 1+ 03/26/2010 10:07 AM      Lab Results   Component Value Date/Time    SODIUM 139 09/21/2017 03:52 AM    POTASSIUM 3.8 09/21/2017 03:52 AM    CHLORIDE 111 09/21/2017 03:52 AM    CO2 21 09/21/2017 03:52 AM    GLUCOSE 93 09/21/2017 03:52 AM    BUN 20 09/21/2017 03:52 AM    CREATININE 0.85 09/21/2017 03:52 AM      Lab Results   Component Value Date/Time    ALTSGPT 49 09/21/2017 03:52 AM    ASTSGOT 17 09/21/2017 03:52 AM    ALKPHOSPHAT 80 09/21/2017 03:52 AM    TBILIRUBIN 0.3 09/21/2017 03:52 AM    ALBUMIN 3.5 09/21/2017 03:52 AM    GLOBULIN 2.6  09/21/2017 03:52 AM    INR 0.96 10/24/2015 01:52 PM     Lab Results   Component Value Date/Time    PROTHROMBTM 12.8 10/24/2015 01:52 PM    INR 0.96 10/24/2015 01:52 PM        Imaging/ Testing:      MR-MRA HEAD-W/O   Final Result      Stable residual aneurysm at the left supraclinoid carotid bifurcation.      ECHOCARDIOGRAM COMP W/O CONT   Final Result          Instructions:      The patient was instructed to return to the ER in the event of worsening symptoms. I have counseled the patient on the importance of compliance and the patient has agreed to proceed with all medical recommendations and follow up plan indicated above.   The patient understands that all medications come with benefits and risks. Risks may include permanent injury or death and these risks can be minimized with close reassessment and monitoring.        This dictation was created using voice recognition software. The accuracy of the dictation is limited to the abilities of the software. Although every efforts have been used to decrease the error, I expect there may be some errors of grammar and possibly content.

## 2017-09-21 NOTE — CARE PLAN
Problem: Infection  Goal: Will remain free from infection  Outcome: PROGRESSING AS EXPECTED      Problem: Bowel/Gastric:  Goal: Normal bowel function is maintained or improved  Outcome: PROGRESSING AS EXPECTED

## 2017-09-21 NOTE — PROGRESS NOTES
Winifred Hyatt Fall Risk Assessment:     Last Known Fall: No falls  Mobility: Dizziness/generalized weakness  Medications: Cardiovascular and central nervous system meds  Mental Status/LOC/Awareness: Awake, alert, and oriented to date, place, and person  Toileting Needs: No needs  Volume/Electrolyte Status: No problems  Communication/Sensory: Visual (Glasses)/hearing deficit  Behavior: Appropriate behavior  Winifred Hyatt Fall Risk Total: 6  Fall Risk Level: NO RISK    Universal Fall Precautions:  call light/belongings in reach, bed in low position and locked, wheelchairs and assistive devices out of sight, siderails up x 2, use non-slip footwear, adequate lighting, clutter free and spill free environment, educate on level of risk, educate to call for assistance    Fall Risk Level Interventions:          Patient Specific Interventions:     Medication: review medications with patient and family and limit combination of prn medications  Mental Status/LOC/Awareness: reinforce falls education, check on patient hourly, utilize bed/chair fall alarm and reinforce the use of call light  Toileting: provide frquent toileting, instruct patient/family on the need to call for assistance when toileting and do not leave patient unattended in bathroom/refer to toileting scripting  Volume/Electrolyte Status: ensure patient remains hydrated and monitor abnormal lab values  Communication/Sensory: update plan of care on whiteboard, ensure proper positioning when transferrng/ambulating and ensure patient has glasses/contacts and hearing aids/dentures  Behavioral: collaborate with doctor for possible psych consult, engage patient in daily activities, administer medication as ordered, initiate plan of care for alcohol withdrawal and instruct/reinforce fall program rationale  Mobility: dangle prior to standing, utilize bed/chair fall alarm, ensure bed is locked and in lowest position and provide appropriate assistive device

## 2017-09-21 NOTE — CARE PLAN
Problem: Safety  Goal: Will remain free from falls  Outcome: PROGRESSING AS EXPECTED      Problem: Infection  Goal: Will remain free from infection  Outcome: PROGRESSING AS EXPECTED    Intervention: Assess signs and symptoms of infection  Assessed for signs and symptoms of infection.   Intervention: Implement standard precautions and perform hand washing before and after patient contact  Standard precautions and hand washing performed before and after patient contact per protocol.    Intervention: Assess for removal of potential routes of infection, such as IV, central line, intra-arterial or urinary catheters  Removal of potential routes of infection assessed.

## 2017-09-21 NOTE — DISCHARGE INSTRUCTIONS
Discharge Instructions    Discharged to home by car with relative. Discharged via wheelchair, hospital escort: Yes.  Special equipment needed: Not Applicable    Be sure to schedule a follow-up appointment with your primary care doctor or any specialists as instructed.     Discharge Plan:   Diet Plan: Discussed  Activity Level: Discussed  Confirmed Symptoms Management: Discussed  Medication Reconciliation Updated: Yes  Influenza Vaccine Indication: Not indicated: Previously immunized this influenza season and > 8 years of age    I understand that a diet low in cholesterol, fat, and sodium is recommended for good health. Unless I have been given specific instructions below for another diet, I accept this instruction as my diet prescription.   Other diet: Regular diet as tolerated     Special Instructions: None    · Is patient discharged on Warfarin / Coumadin?   No     · Is patient Post Blood Transfusion?  No    Depression / Suicide Risk    As you are discharged from this [facility] facility, it is important to learn how to keep safe from harming yourself.    Recognize the warning signs:  · Abrupt changes in personality, positive or negative- including increase in energy   · Giving away possessions  · Change in eating patterns- significant weight changes-  positive or negative  · Change in sleeping patterns- unable to sleep or sleeping all the time   · Unwillingness or inability to communicate  · Depression  · Unusual sadness, discouragement and loneliness  · Talk of wanting to die  · Neglect of personal appearance   · Rebelliousness- reckless behavior  · Withdrawal from people/activities they love  · Confusion- inability to concentrate     If you or a loved one observes any of these behaviors or has concerns about self-harm, here's what you can do:  · Talk about it- your feelings and reasons for harming yourself  · Remove any means that you might use to hurt yourself (examples: pills, rope, extension cords,  firearm)  · Get professional help from the community (Mental Health, Substance Abuse, psychological counseling)  · Do not be alone:Call your Safe Contact- someone whom you trust who will be there for you.  · Call your local CRISIS HOTLINE 417-4318 or 247-110-3239  · Call your local Children's Mobile Crisis Response Team [place] (446) 869-8147 or www.[employer]  · Call the toll free National Suicide Prevention Hotlines   · National Suicide Prevention Lifeline 859-398-HZQI (5364)  · National Hope Line Network 800-SUICIDE (173-6635)

## 2017-09-21 NOTE — PROGRESS NOTES
Pt is aware that she is being discharged. IV has been removed and discharge instructions have been given. Pt has prescription for pain medication and educated on tylenol additive to medication. Pt states that her ride will be here within the hour.

## 2017-09-22 ENCOUNTER — PATIENT OUTREACH (OUTPATIENT)
Dept: HEALTH INFORMATION MANAGEMENT | Facility: OTHER | Age: 57
End: 2017-09-22

## 2017-09-24 LAB
BACTERIA BLD CULT: NORMAL
BACTERIA BLD CULT: NORMAL
SIGNIFICANT IND 70042: NORMAL
SIGNIFICANT IND 70042: NORMAL
SITE SITE: NORMAL
SITE SITE: NORMAL
SOURCE SOURCE: NORMAL
SOURCE SOURCE: NORMAL

## 2017-09-27 ENCOUNTER — TELEPHONE (OUTPATIENT)
Dept: NEUROLOGY | Facility: MEDICAL CENTER | Age: 57
End: 2017-09-27

## 2017-09-27 NOTE — TELEPHONE ENCOUNTER
I was just in [facility] for a week and they ran an MRI, an MRA a heart ultra-sound and I believe that may be all.  However, I was given no results.  Is there any way I can ask Dr. Torres to review my test results and let me know what's going on?  I know I was due for an MRI in December.  I imagine I wont need one now.  I am very disappointed with the [facility] team this visit.  I will await an response to hear the outcome of my tests.  Dr. Felton came in to see me while I was in the Hospital as well.     Sharlene Garcia      Pt called left  also stating that she is really frustrated that has not gotten any of her results for when she was seen in the hospital was there for a week without any results.

## 2018-01-18 ENCOUNTER — PATIENT MESSAGE (OUTPATIENT)
Dept: NEUROLOGY | Facility: MEDICAL CENTER | Age: 58
End: 2018-01-18

## 2018-01-18 ENCOUNTER — OFFICE VISIT (OUTPATIENT)
Dept: NEUROLOGY | Facility: MEDICAL CENTER | Age: 58
End: 2018-01-18
Payer: COMMERCIAL

## 2018-01-18 VITALS
OXYGEN SATURATION: 98 % | HEIGHT: 66 IN | HEART RATE: 74 BPM | BODY MASS INDEX: 30.95 KG/M2 | RESPIRATION RATE: 16 BRPM | SYSTOLIC BLOOD PRESSURE: 134 MMHG | DIASTOLIC BLOOD PRESSURE: 88 MMHG | TEMPERATURE: 97.9 F | WEIGHT: 192.6 LBS

## 2018-01-18 DIAGNOSIS — I67.1 CEREBRAL ANEURYSM WITHOUT RUPTURE: ICD-10-CM

## 2018-01-18 DIAGNOSIS — G43.001 MIGRAINE WITHOUT AURA AND WITH STATUS MIGRAINOSUS, NOT INTRACTABLE: ICD-10-CM

## 2018-01-18 DIAGNOSIS — R41.89 COGNITIVE CHANGE: ICD-10-CM

## 2018-01-18 PROCEDURE — 99214 OFFICE O/P EST MOD 30 MIN: CPT | Performed by: PSYCHIATRY & NEUROLOGY

## 2018-01-18 RX ORDER — TOPIRAMATE 100 MG/1
100 TABLET, FILM COATED ORAL 2 TIMES DAILY
Qty: 180 TAB | Refills: 1 | Status: SHIPPED | OUTPATIENT
Start: 2018-01-18 | End: 2018-07-06 | Stop reason: SDUPTHER

## 2018-01-18 ASSESSMENT — PATIENT HEALTH QUESTIONNAIRE - PHQ9
CLINICAL INTERPRETATION OF PHQ2 SCORE: 4
SUM OF ALL RESPONSES TO PHQ QUESTIONS 1-9: 11
5. POOR APPETITE OR OVEREATING: 1 - SEVERAL DAYS

## 2018-01-18 NOTE — PATIENT INSTRUCTIONS
IMPRESSION:    1. Migraine under Topamax, without Topamax, headache became worse  2. Aneurysm status post coiling -- monitored by Dr Hazel and us-- every year  3. Claustrophobia ( needs Xanax 2mg before procedure)    PLAN/RECOMMENDATIONS:        We will continue Topamax 100mg two times per day  We will offer EEG with all these aneurysm and intractable headache    SIGNATURE:  Cathie Cam      CC:  Alberto Gandara M.D.      9/2017  1.  Minimal supratentorial white matter disease most consistent with microvascular ischemic change versus demyelination or gliosis. Common findings for the patient's age.  2.  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 No evidence of intercurrent hemorrhage. No significant change.  3.  Chiari I malformation. Stable finding.  4.  Status post coiling supraclinoid left carotid terminus aneurysm. Expected susceptibility artifact from the coil pack. No definite recurrent aneurysm, however, MRA would be a better way to evaluate.

## 2018-01-18 NOTE — PROGRESS NOTES
NEUROLOGY NOTE    Referring Physician  Alberto Gandara M.D.      CHIEF COMPLAINT:  Headache responded with Topamax  Chief Complaint   Patient presents with   • Establish Care     Migraine       PRESENT ILLNESS:       This 57-year-old woman who has had a history of migraines in the past that usually were rare but has had episodes of severe facial pain that is bilateral although it more common more severe on the left that can persist for a couple of weeks. One time when she had this a chiropractor manipulated her jaw which relieved. She was found have a 7 mm left carotid aneurysm that was coiled by interventional radiology and was scheduled to have a follow-up MRI but has never been scheduled. She has a complaint of falling sound heard in her head that is not apparently tinnitus but it is diminished by the presence of external white noise. One of her MRI study she was shown to have some sort of vascular anomaly and around the deborah and could not be clearly seen on the MRA that was done prior to the coiling. She has been using nasal steroids frequently on the advice of an allergist. She does have some wheezing attributable to allergies. She says she does not sleep well at night. Considerable stress with job changes which he now apparently has stable employment pattern. She complains of a chronic daily headache different from her migraines often felt occipital region occasionally bifrontal generally described as steady occasionally with photophobia rarely with nausea significantly milder than her usual migraines.    PAST MEDICAL HISTORY:  Past Medical History:   Diagnosis Date   • Allergy    • Anxiety     Due to financial issues, never took medication.   • ASTHMA    • Chronic bronchitis     Including multiple episodes of pnemonia, until 2001   • GERD (gastroesophageal reflux disease) 2005    Doing better now with weight loss 6/10.   • Headache    • Hyperlipidemia LDL goal < 160 10/28/2010   • Menopausal and  perimenopausal disorder    • Migraine     Rare, many years ago.       PAST SURGICAL HISTORY:  Past Surgical History:   Procedure Laterality Date   • RECOVERY  10/25/2015    Procedure: [facility] RECOVERY ONLY;  Surgeon: [facility]Rec[facility] Surgery;  Location: SURGERY [facility];  Service:    • CRISTINA BY LAPAROSCOPY  2011    Performed by LUIS ALBERTO ROBLES at SURGERY [facility]   • HYSTEROSCOPY WITH VIDEO OPERATIVE  2010    Performed by PETER MORALES at SURGERY SAME DAY [facility]   • PB  DELIVERY ONLY     • PRIMARY C SECTION     • MAMMOPLASTY AUGMENTATION      And removal calcium deposits   • ME ORAL SURGERY PROCEDURE      wisdom teeth extraction   • BUNIONECTOMY  1978/1984    x2   • PB ENLARGE BREAST WITH IMPLANT         FAMILY HISTORY:  Family History   Problem Relation Age of Onset   • Cancer Mother 53     Ovarian  at 57 from it   • Diabetes Father        SOCIAL HISTORY:  Social History     Social History   • Marital status:      Spouse name: N/A   • Number of children: N/A   • Years of education: N/A     Occupational History   • Not on file.     Social History Main Topics   • Smoking status: Never Smoker   • Smokeless tobacco: Never Used   • Alcohol use 0.0 - 0.5 oz/week      Comment: once a week   • Drug use: No   • Sexual activity: Yes     Partners: Male     Birth control/ protection: Post-Menopausal      Comment: menopausal since      Other Topics Concern   • Not on file     Social History Narrative   • No narrative on file     ALLERGIES:  Allergies   Allergen Reactions   • Nkda [No Known Drug Allergy]    • Other Environmental      TOBHX  History   Smoking Status   • Never Smoker   Smokeless Tobacco   • Never Used     ALCHX  History   Alcohol Use   • 0.0 - 0.5 oz/week     Comment: once a week     DRUGHX  History   Drug Use No           MEDICATIONS:  Current Outpatient Prescriptions   Medication   • cetirizine (ZYRTEC) 10 MG Tab   • diphenhydrAMINE  "(BENADRYL) 25 MG Tab   • hydrocodone-acetaminophen (NORCO) 7.5-325 MG per tablet   • topiramate (TOPAMAX) 100 MG Tab   • acetaminophen/caffeine/butalbital 325-40-50 mg (FIORICET) -40 MG Tab   • acetaminophen (TYLENOL) 325 MG Tab   • naproxen (NAPROSYN) 500 MG Tab   • alprazolam (XANAX) 0.25 MG Tab   • enoxaparin (LOVENOX) 40 MG/0.4ML Solution inj   • topiramate (TOPAMAX) 50 MG tablet   • ondansetron (ZOFRAN ODT) 4 MG TABLET DISPERSIBLE   • atorvastatin (LIPITOR) 20 MG Tab   • potassium chloride SA (KDUR) 20 MEQ Tab CR     No current facility-administered medications for this visit.        REVIEW OF SYSTEM:    Constitutional: Denies fevers, Denies weight changes   Eyes: Denies changes in vision, no eye pain   Ears/Nose/Throat/Mouth: Denies nasal congestion or sore throat   Cardiovascular: Denies chest pain or palpitations   Respiratory: Denies SOB.   Gastrointestinal/Hepatic: Denies abdominal pain, nausea, vomiting, diarrhea, constipation or GI bleeding   Genitourinary: Denies bladder dysfunction, dysuria or frequency   Musculoskeletal/Rheum: Denies joint pain and swelling   Skin/Breast: Denies rash, denies breast lumps or discharge   Neurological: aneurysm status post coil  Psychiatric: denies mood disorder   Endocrine: denies hx of diabetes or thyroid dysfunction   Heme/Oncology/Lymph Nodes: Denies enlarged lymph nodes, denies brusing or known bleeding disorder   Allergic/Immunologic: Denies hx of allergies         PHYSICAL AND NEUROLOGICAL EXMAINATIONS:  VITAL SIGNS: /88   Pulse 74   Temp 36.6 °C (97.9 °F)   Resp 16   Ht 1.676 m (5' 6\")   Wt 87.4 kg (192 lb 9.6 oz)   SpO2 98%   BMI 31.09 kg/m²   CURRENT WEIGHT: [unfilled]  BMI: Body mass index is 31.09 kg/m².  PREVIOUS WEIGHTS:  Wt Readings from Last 25 Encounters:   01/18/18 87.4 kg (192 lb 9.6 oz)   09/19/17 89.5 kg (197 lb 5 oz)   09/19/17 89.3 kg (196 lb 13.9 oz)   03/29/17 81.6 kg (180 lb)   12/01/16 89.7 kg (197 lb 12.8 oz)   08/31/16 88.6 kg " (195 lb 6.4 oz)   06/29/16 88.5 kg (195 lb 3.2 oz)   05/11/16 78 kg (172 lb)   04/22/16 78 kg (172 lb)   10/26/15 81.2 kg (179 lb 0.2 oz)   10/24/15 78 kg (172 lb)   11/16/11 77.1 kg (170 lb)   10/10/11 78.5 kg (173 lb)   11/29/10 76.2 kg (168 lb)   10/05/10 75.8 kg (167 lb 3.2 oz)   03/26/10 83.5 kg (184 lb)       General appearance of patient: WDWN(+) NAD(+)    EYES  o Fundus : Papilledem(-) Exudates(-) Hemorrhage(-)  Nervous System  Orientation to time, place and person(+)  Memory normal(+)  Language: aphasia(-)  Knowledge: past(+) Current(+)  Attention(+)  Cranial Nerves  • Nerve 2: intact  • Nerve 3,4,6: intact  • Nerve 5 : intact  • Nerve 7: intact  • Nerve 8: intact  • Nerve 9 & 10: intact  • Nerve 11: intact  • Nerve 12: intact  Muscle Power and muscle tone: symmetric, normal in upper and lower  Sensory System: Pin sensation intact(+)  Reflexes: symmetric throughout  Cerebellar Function FNP normal   Gait : Steady(+) TandemGait steady(+)  Heart and Vascular  Peripheral Vasucular system : Edema (-) Swelling(-)  RHB, Breathing sound clear  abdomen bowel sound normoactive  Extremities freely moveable  Joints no contracture       NEUROIMAGING: I reviewed the MRI/CT of brain           IMPRESSION:    1. Migraine under Topamax, without Topamax, headache became worse  2. Aneurysm status post coiling -- monitored by Dr Hazel every year  3. Claustrophobia ( needs Xanax 2mg before procedure)    PLAN/RECOMMENDATIONS:        We will continue Topamax 100mg two times per day  We will offer EEG with all these aneurysm and intractable headache    SIGNATURE:  Cathie aCm      CC:  Alberto Gandara M.D.      9/2017  1.  Minimal supratentorial white matter disease most consistent with microvascular ischemic change versus demyelination or gliosis. Common findings for the patient's age.  2.  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 No evidence of intercurrent hemorrhage. No significant change.  3.  Chiari I  malformation. Stable finding.  4.  Status post coiling supraclinoid left carotid terminus aneurysm. Expected susceptibility artifact from the coil pack. No definite recurrent aneurysm, however, MRA would be a better way to evaluate.

## 2018-01-19 ENCOUNTER — TELEPHONE (OUTPATIENT)
Dept: NEUROLOGY | Facility: MEDICAL CENTER | Age: 58
End: 2018-01-19

## 2018-01-19 NOTE — TELEPHONE ENCOUNTER
Sharlene Garcia   to Cathie Cam M.D.           4:03 PM   I was contacted by [facility] regarding the test that Dr. Cam wanted me to have.  Two problems.  #1 they cannot do the test on a Saturday, which would not be a problem except #2, they want me to be sleep deprived and I have a job.  I have to work, so I do not see how I could keep myself awake and even if I was able to, I would not be able to function at work if I did.  I do not believe I will be able to take this test unless I can do it on a Weekend.  Please pass this info on to Dr. Cam.     me  to Sharlene Garcia           10:56 AM   You can schedule test for the first appoint of the day @ 7:30am. We can give you a note if you need to go home and take a nap afterward.     Have a good weekend      Last read by Sharlene Garcia at 11:10 AM on 1/19/2018.   My  would want to take off work to accompany me.  The last time I was admitted to the hospital, I had a couple of episodes of blacking out and I have severe vertigo issues.  I don't take chances.  I would have to take a whole day off.  If I could do the test on a Saturday, it would not be an issue.  But since I cannot, both my  and I cannot afford to take a day off for a test.     Dr Cam please advise

## 2018-01-22 ENCOUNTER — TELEPHONE (OUTPATIENT)
Dept: NEUROLOGY | Facility: MEDICAL CENTER | Age: 58
End: 2018-01-22

## 2018-01-22 NOTE — TELEPHONE ENCOUNTER
Patient called wondering why she is getting calls from here. I informed her it was probably someone trying to schedule her for a EEG  that was ordered. Patient still is not wanting to schedule EEg. She is concerned about not being able to stay awake and cannot miss work. She wants to get done on a Saturday. Explained to her they do not don that exam on Saturday's. I reassured her if she can't the full amount of awake time she can still do testing. I asked to think about her options and call scheduling when she makes a decision.

## 2018-02-15 ENCOUNTER — TELEPHONE (OUTPATIENT)
Dept: NEUROLOGY | Facility: MEDICAL CENTER | Age: 58
End: 2018-02-15

## 2018-02-15 NOTE — TELEPHONE ENCOUNTER
Sharlene ARIZMENDI [facility]   Phone Number: [phone]             I would like to ask Dr. Cam if he is aware of anything that will help with my tinnitus.   I have been told by several people that they have gotten help with theirs.   Dr. Torres never really discussed it with me.  However, mine is quite severe and seems to be getting worse.  It's getting to the point I can barely hear over it.  Do you think you could pass a note to Dr. Cam to see if he might have some ideas as to what I can do to help me with the horrible noise in my head?  It is getting to the point where I just want to scream.  It is so disruptive that I am having a hard time at work.  Please let me know.     Thank you!     Sharlene     Spoke to patient she in a lot of pain all of the time. She sleeps every opportunity she can even at work.   She stated Dr Torres gave her Xanax to  Relax her prn for sleep when her pain is bad.       Please advise    Sent via [employer]    Thank you

## 2018-02-16 NOTE — TELEPHONE ENCOUNTER
First of all, I do not favor xanax, valium for tinnitus  Advise dietary restrictions  Advise EEG ( after EEG, we may offer anti-seizure medication to help headache and tinnitus

## 2018-02-16 NOTE — TELEPHONE ENCOUNTER
First of all, I do not favor xanax, valium for tinnitus  Advise dietary restrictions  Advise EEG ( after EEG, we may offer anti-seizure medication to help headache and tinnitus)    ________________________________________________________________________          ________________________________________________________________________

## 2018-03-16 DIAGNOSIS — G43.001 MIGRAINE WITHOUT AURA AND WITH STATUS MIGRAINOSUS, NOT INTRACTABLE: ICD-10-CM

## 2018-03-19 RX ORDER — ALPRAZOLAM 0.25 MG/1
0.5 TABLET ORAL NIGHTLY PRN
Qty: 16 TAB | Refills: 0 | Status: SHIPPED | OUTPATIENT
Start: 2018-03-19 | End: 2018-04-18

## 2018-04-11 ENCOUNTER — TELEPHONE (OUTPATIENT)
Dept: NEUROLOGY | Facility: MEDICAL CENTER | Age: 58
End: 2018-04-11

## 2018-04-11 NOTE — TELEPHONE ENCOUNTER
Spoke to patient leg pain so bad she could hardly to get out of bed. She she does not want to go to her pcp, because she thinks it's a neuro problem. I made appointment for tomorrow.

## 2018-04-12 ENCOUNTER — OFFICE VISIT (OUTPATIENT)
Dept: NEUROLOGY | Facility: MEDICAL CENTER | Age: 58
End: 2018-04-12
Payer: COMMERCIAL

## 2018-04-12 VITALS
SYSTOLIC BLOOD PRESSURE: 140 MMHG | DIASTOLIC BLOOD PRESSURE: 82 MMHG | HEART RATE: 72 BPM | WEIGHT: 197 LBS | OXYGEN SATURATION: 97 % | BODY MASS INDEX: 31.66 KG/M2 | TEMPERATURE: 98 F | HEIGHT: 66 IN | RESPIRATION RATE: 18 BRPM

## 2018-04-12 DIAGNOSIS — G43.019 INTRACTABLE MIGRAINE WITHOUT AURA AND WITHOUT STATUS MIGRAINOSUS: ICD-10-CM

## 2018-04-12 DIAGNOSIS — G93.5 CHIARI MALFORMATION TYPE I (HCC): ICD-10-CM

## 2018-04-12 DIAGNOSIS — H93.13 TINNITUS OF BOTH EARS: ICD-10-CM

## 2018-04-12 PROCEDURE — 99214 OFFICE O/P EST MOD 30 MIN: CPT | Performed by: PSYCHIATRY & NEUROLOGY

## 2018-04-12 RX ORDER — ACETAZOLAMIDE 250 MG/1
500 TABLET ORAL 2 TIMES DAILY
Qty: 120 TAB | Refills: 4 | Status: SHIPPED | OUTPATIENT
Start: 2018-04-12 | End: 2018-09-07

## 2018-04-12 ASSESSMENT — PATIENT HEALTH QUESTIONNAIRE - PHQ9: CLINICAL INTERPRETATION OF PHQ2 SCORE: 0

## 2018-04-12 NOTE — PATIENT INSTRUCTIONS
IMPRESSION:    1. Migraine under Topamax, without Topamax, headache became worse  2. Aneurysm status post coiling -- monitored by Dr Hazel every year ( MRI compatible)  3. Claustrophobia ( needs Xanax 2mg before procedure)  4. Chiari I malformation. Stable finding ( coughing did make her headache worse)      PLAN/RECOMMENDATIONS:    The major concerns today are funny spells of pain and hurting    Regarding the tinnitus, advise      1. ENT consultation   2. EEG and try new medicine (We will offer EEG with all these aneurysm and intractable headache)   3. Diet Modification-- see reference    We will continue Topamax 100mg two times per day  This time, we will add Diamox 500mg two times per day regarding headache as well    Advise dietary restrictions  Advise EEG ( after EEG, we may offer anti-seizure medication to help headache and tinnitus)    ________________________________________________________________________          ________________________________________________________________________        SIGNATURE:  Cathie Cam      CC:  Alberto Gandara M.D.      9/2017  1.  Minimal supratentorial white matter disease most consistent with microvascular ischemic change versus demyelination or gliosis. Common findings for the patient's age.  2.  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 No evidence of intercurrent hemorrhage. No significant change.  3.  Chiari I malformation. Stable finding.  4.  Status post coiling supraclinoid left carotid terminus aneurysm. Expected susceptibility artifact from the coil pack. No definite recurrent aneurysm, however, MRA would be a better way to evaluate.            ________________________________________________________________________  REFERENCE ARNOLD-CHIARI TYPE I          ________________________________________________________________________

## 2018-04-12 NOTE — PROGRESS NOTES
NEUROLOGY NOTE    Referring Physician  Alberto Gandara M.D.      CHIEF COMPLAINT:  Headache responded with Topamax  Chief Complaint   Patient presents with   • Follow-Up     Leg pain       PRESENT ILLNESS:       This 57-year-old woman who has had a history of migraines in the past that usually were rare but has had episodes of severe facial pain that is bilateral although it more common more severe on the left that can persist for a couple of weeks. One time when she had this a chiropractor manipulated her jaw which relieved. She was found have a 7 mm left carotid aneurysm that was coiled by interventional radiology and was scheduled to have a follow-up MRI but has never been scheduled. She has a complaint of falling sound heard in her head that is not apparently tinnitus but it is diminished by the presence of external white noise. One of her MRI study she was shown to have some sort of vascular anomaly and around the deborah and could not be clearly seen on the MRA that was done prior to the coiling. She has been using nasal steroids frequently on the advice of an allergist. She does have some wheezing attributable to allergies. She says she does not sleep well at night. Considerable stress with job changes which he now apparently has stable employment pattern. She complains of a chronic daily headache different from her migraines often felt occipital region occasionally bifrontal generally described as steady occasionally with photophobia rarely with nausea significantly milder than her usual migraines.    PAST MEDICAL HISTORY:  Past Medical History:   Diagnosis Date   • Allergy    • Anxiety     Due to financial issues, never took medication.   • ASTHMA    • Chronic bronchitis     Including multiple episodes of pnemonia, until 2001   • GERD (gastroesophageal reflux disease) 2005    Doing better now with weight loss 6/10.   • Headache    • Hyperlipidemia LDL goal < 160 10/28/2010   • Menopausal and  perimenopausal disorder    • Migraine     Rare, many years ago.       PAST SURGICAL HISTORY:  Past Surgical History:   Procedure Laterality Date   • RECOVERY  10/25/2015    Procedure: [facility] RECOVERY ONLY;  Surgeon: Ir-Recovery Surgery;  Location: SURGERY [facility];  Service:    • CRISTINA BY LAPAROSCOPY  2011    Performed by LUIS ALBERTO ROBLES at SURGERY [facility]   • HYSTEROSCOPY WITH VIDEO OPERATIVE  2010    Performed by PETER MORALES at SURGERY SAME DAY [facility]S   • PB  DELIVERY ONLY     • PRIMARY C SECTION     • MAMMOPLASTY AUGMENTATION      And removal calcium deposits   • NC ORAL SURGERY PROCEDURE      wisdom teeth extraction   • BUNIONECTOMY  1978/1984    x2   • PB ENLARGE BREAST WITH IMPLANT         FAMILY HISTORY:  Family History   Problem Relation Age of Onset   • Cancer Mother 53     Ovarian  at 57 from it   • Diabetes Father        SOCIAL HISTORY:  Social History     Social History   • Marital status:      Spouse name: N/A   • Number of children: N/A   • Years of education: N/A     Occupational History   • Not on file.     Social History Main Topics   • Smoking status: Never Smoker   • Smokeless tobacco: Never Used   • Alcohol use 0.0 - 0.5 oz/week      Comment: once a week   • Drug use: No   • Sexual activity: Yes     Partners: Male     Birth control/ protection: Post-Menopausal      Comment: menopausal since      Other Topics Concern   • Not on file     Social History Narrative   • No narrative on file     ALLERGIES:  Allergies   Allergen Reactions   • Nkda [No Known Drug Allergy]    • Other Environmental      TOBHX  History   Smoking Status   • Never Smoker   Smokeless Tobacco   • Never Used     ALCHX  History   Alcohol Use   • 0.0 - 0.5 oz/week     Comment: once a week     DRUGHX  History   Drug Use No           MEDICATIONS:  Current Outpatient Prescriptions   Medication   • topiramate (TOPAMAX) 100 MG Tab   • cetirizine (ZYRTEC) 10  "MG Tab   • diphenhydrAMINE (BENADRYL) 25 MG Tab   • ALPRAZolam (XANAX) 0.25 MG Tab   • hydrocodone-acetaminophen (NORCO) 7.5-325 MG per tablet   • acetaminophen/caffeine/butalbital 325-40-50 mg (FIORICET) -40 MG Tab   • acetaminophen (TYLENOL) 325 MG Tab   • naproxen (NAPROSYN) 500 MG Tab   • enoxaparin (LOVENOX) 40 MG/0.4ML Solution inj   • topiramate (TOPAMAX) 50 MG tablet   • ondansetron (ZOFRAN ODT) 4 MG TABLET DISPERSIBLE   • atorvastatin (LIPITOR) 20 MG Tab   • potassium chloride SA (KDUR) 20 MEQ Tab CR     No current facility-administered medications for this visit.        REVIEW OF SYSTEM:    Constitutional: Denies fevers, Denies weight changes   Eyes: Denies changes in vision, no eye pain   Ears/Nose/Throat/Mouth: Denies nasal congestion or sore throat   Cardiovascular: Denies chest pain or palpitations   Respiratory: Denies SOB.   Gastrointestinal/Hepatic: Denies abdominal pain, nausea, vomiting, diarrhea, constipation or GI bleeding   Genitourinary: Denies bladder dysfunction, dysuria or frequency   Musculoskeletal/Rheum: Denies joint pain and swelling   Skin/Breast: Denies rash, denies breast lumps or discharge   Neurological: aneurysm status post coil  Psychiatric: denies mood disorder   Endocrine: denies hx of diabetes or thyroid dysfunction   Heme/Oncology/Lymph Nodes: Denies enlarged lymph nodes, denies brusing or known bleeding disorder   Allergic/Immunologic: Denies hx of allergies         PHYSICAL AND NEUROLOGICAL EXMAINATIONS:  VITAL SIGNS: /82   Pulse 72   Temp 36.7 °C (98 °F)   Resp 18   Ht 1.676 m (5' 6\")   Wt 89.4 kg (197 lb)   SpO2 97%   BMI 31.80 kg/m²   CURRENT WEIGHT: [unfilled]  BMI: Body mass index is 31.8 kg/m².  PREVIOUS WEIGHTS:  Wt Readings from Last 25 Encounters:   04/12/18 89.4 kg (197 lb)   01/18/18 87.4 kg (192 lb 9.6 oz)   09/19/17 89.5 kg (197 lb 5 oz)   09/19/17 89.3 kg (196 lb 13.9 oz)   03/29/17 81.6 kg (180 lb)   12/01/16 89.7 kg (197 lb 12.8 oz)   "   08/31/16 88.6 kg (195 lb 6.4 oz)   06/29/16 88.5 kg (195 lb 3.2 oz)   05/11/16 78 kg (172 lb)   04/22/16 78 kg (172 lb)   10/26/15 81.2 kg (179 lb 0.2 oz)   10/24/15 78 kg (172 lb)   11/16/11 77.1 kg (170 lb)   10/10/11 78.5 kg (173 lb)   11/29/10 76.2 kg (168 lb)   10/05/10 75.8 kg (167 lb 3.2 oz)   03/26/10 83.5 kg (184 lb)       General appearance of patient: WDWN(+) NAD(+)    EYES  o Fundus : Papilledem(-) Exudates(-) Hemorrhage(-)  Nervous System  Orientation to time, place and person(+)  Memory normal(+)  Language: aphasia(-)  Knowledge: past(+) Current(+)  Attention(+)  Cranial Nerves  • Nerve 2: intact  • Nerve 3,4,6: intact  • Nerve 5 : intact  • Nerve 7: intact  • Nerve 8: intact  • Nerve 9 & 10: intact  • Nerve 11: intact  • Nerve 12: intact  Muscle Power and muscle tone: symmetric, normal in upper and lower  Sensory System: Pin sensation intact(+)  Reflexes: symmetric throughout  Cerebellar Function FNP normal   Gait : Steady(+) TandemGait steady(+)  Heart and Vascular  Peripheral Vasucular system : Edema (-) Swelling(-)  RHB, Breathing sound clear  abdomen bowel sound normoactive  Extremities freely moveable  Joints no contracture       NEUROIMAGING: I reviewed the MRI/CT of brain   First of all, I do not favor xanax, valium for tinnitus  Advise dietary restrictions  Advise EEG ( after EEG, we may offer anti-seizure medication to help headache and tinnitus)    ________________________________________________________________________          ________________________________________________________________________          IMPRESSION:    1. Migraine under Topamax, without Topamax, headache became worse  2. Aneurysm status post coiling -- monitored by Dr Hazel every year ( MRI compatible)  3. Claustrophobia ( needs Xanax 2mg before procedure)  4. Chiari I malformation. Stable finding ( coughing did make her headache worse)      PLAN/RECOMMENDATIONS:    The major concerns today are funny spells of pain  and hurting    Regarding the tinnitus, advise      1. ENT consultation   2. EEG and try new medicine (We will offer EEG with all these aneurysm and intractable headache)   3. Diet Modification-- see reference    We will continue Topamax 100mg two times per day  This time, we will add Diamox 500mg two times per day regarding headache as well    Advise dietary restrictions  Advise EEG ( after EEG, we may offer anti-seizure medication to help headache and tinnitus)    ________________________________________________________________________          ________________________________________________________________________        SIGNATURE:  Cathie Cam      CC:  Alberto Gandara M.D.      9/2017  1.  Minimal supratentorial white matter disease most consistent with microvascular ischemic change versus demyelination or gliosis. Common findings for the patient's age.  2.  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 No evidence of intercurrent hemorrhage. No significant change.  3.  Chiari I malformation. Stable finding.  4.  Status post coiling supraclinoid left carotid terminus aneurysm. Expected susceptibility artifact from the coil pack. No definite recurrent aneurysm, however, MRA would be a better way to evaluate.            ________________________________________________________________________  REFERENCE ARNOLD-CHIARI TYPE I          ________________________________________________________________________

## 2018-04-26 ENCOUNTER — OFFICE VISIT (OUTPATIENT)
Dept: URGENT CARE | Facility: CLINIC | Age: 58
End: 2018-04-26
Payer: COMMERCIAL

## 2018-04-26 VITALS
HEIGHT: 66 IN | WEIGHT: 206 LBS | TEMPERATURE: 97.4 F | RESPIRATION RATE: 18 BRPM | BODY MASS INDEX: 33.11 KG/M2 | OXYGEN SATURATION: 95 % | SYSTOLIC BLOOD PRESSURE: 130 MMHG | DIASTOLIC BLOOD PRESSURE: 85 MMHG | HEART RATE: 68 BPM

## 2018-04-26 DIAGNOSIS — G43.009 MIGRAINE WITHOUT AURA AND WITHOUT STATUS MIGRAINOSUS, NOT INTRACTABLE: ICD-10-CM

## 2018-04-26 DIAGNOSIS — J22 LRTI (LOWER RESPIRATORY TRACT INFECTION): ICD-10-CM

## 2018-04-26 DIAGNOSIS — R05.9 COUGH: ICD-10-CM

## 2018-04-26 DIAGNOSIS — J98.01 BRONCHOSPASM: ICD-10-CM

## 2018-04-26 PROCEDURE — 99214 OFFICE O/P EST MOD 30 MIN: CPT | Performed by: NURSE PRACTITIONER

## 2018-04-26 RX ORDER — DOXYCYCLINE HYCLATE 100 MG
100 TABLET ORAL 2 TIMES DAILY
Qty: 14 TAB | Refills: 0 | Status: SHIPPED | OUTPATIENT
Start: 2018-04-26 | End: 2018-05-03

## 2018-04-26 RX ORDER — METHYLPREDNISOLONE 4 MG/1
4 TABLET ORAL DAILY
Qty: 1 KIT | Refills: 0 | Status: SHIPPED | OUTPATIENT
Start: 2018-04-26 | End: 2018-12-03

## 2018-04-26 RX ORDER — CODEINE PHOSPHATE AND GUAIFENESIN 10; 100 MG/5ML; MG/5ML
5 SOLUTION ORAL EVERY 4 HOURS PRN
Qty: 75 ML | Refills: 0 | Status: SHIPPED | OUTPATIENT
Start: 2018-04-26 | End: 2018-04-29

## 2018-04-26 ASSESSMENT — ENCOUNTER SYMPTOMS
DIZZINESS: 0
INSOMNIA: 0
COUGH: 1
SEIZURES: 0
HEADACHES: 1
SHORTNESS OF BREATH: 0
SPEECH CHANGE: 0
SINUS PRESSURE: 0
FEVER: 1
MYALGIAS: 1
BLURRED VISION: 0
SCALP TENDERNESS: 0
VISUAL CHANGE: 0
MIGRAINE HEADACHES: 1
CHILLS: 0
TINGLING: 0
EYE PAIN: 0
VOMITING: 0
NAUSEA: 1
SENSORY CHANGE: 0
WEIGHT LOSS: 0
FOCAL WEAKNESS: 0
SORE THROAT: 1

## 2018-04-26 NOTE — PROGRESS NOTES
Subjective:     Sharlene Garcia is a 58 y.o. female who presents for Cough (X 4 days dry cough and congestion, headache .)       Cough   This is a new problem. The current episode started in the past 7 days. The problem has been unchanged. The problem occurs constantly. The cough is productive of sputum. Associated symptoms include a fever, headaches, myalgias, nasal congestion, postnasal drip and a sore throat. Pertinent negatives include no chest pain, chills, rash, shortness of breath or weight loss. Nothing aggravates the symptoms. She has tried OTC cough suppressant for the symptoms. The treatment provided no relief. Her past medical history is significant for pneumonia.   Headache    This is a chronic problem. The current episode started in the past 7 days. The problem occurs constantly. The problem has been unchanged. The pain is located in the bilateral region. The pain does not radiate. The pain quality is similar to prior headaches. The quality of the pain is described as throbbing. The pain is at a severity of 7/10. The pain is severe. Associated symptoms include coughing, a fever, nausea and a sore throat. Pertinent negatives include no blurred vision, dizziness, eye pain, insomnia, scalp tenderness, seizures, sinus pressure, tingling, visual change, vomiting or weight loss. Nothing aggravates the symptoms. She has tried triptans, Excedrin and NSAIDs for the symptoms. The treatment provided no relief. Her past medical history is significant for migraine headaches.     Past Medical History:   Diagnosis Date   • Allergy    • Anxiety     Due to financial issues, never took medication.   • ASTHMA    • Chronic bronchitis     Including multiple episodes of pnemonia, until 2001   • GERD (gastroesophageal reflux disease) 2005    Doing better now with weight loss 6/10.   • Headache(784.0)    • Hyperlipidemia LDL goal < 160 10/28/2010   • Menopausal and perimenopausal disorder 2005   • Migraine     Rare, many years  ago.     Past Surgical History:   Procedure Laterality Date   • RECOVERY  10/25/2015    Procedure: [facility] RECOVERY ONLY;  Surgeon: Ir-Recovery Surgery;  Location: SURGERY [facility];  Service:    • CRISTINA BY LAPAROSCOPY  2011    Performed by LUIS ALBERTO ROBLES at SURGERY [facility]   • HYSTEROSCOPY WITH VIDEO OPERATIVE  2010    Performed by PETER MORALES at SURGERY SAME DAY [facility]S   • PB  DELIVERY ONLY     • PRIMARY C SECTION     • MAMMOPLASTY AUGMENTATION      And removal calcium deposits   • KS ORAL SURGERY PROCEDURE      wisdom teeth extraction   • BUNIONECTOMY  1978/1984    x2   • PB ENLARGE BREAST WITH IMPLANT       Social History     Social History   • Marital status:      Spouse name: N/A   • Number of children: N/A   • Years of education: N/A     Occupational History   • Not on file.     Social History Main Topics   • Smoking status: Never Smoker   • Smokeless tobacco: Never Used   • Alcohol use 0.0 - 0.5 oz/week      Comment: once a week   • Drug use: No   • Sexual activity: Yes     Partners: Male     Birth control/ protection: Post-Menopausal      Comment: menopausal since      Other Topics Concern   • Not on file     Social History Narrative   • No narrative on file      Family History   Problem Relation Age of Onset   • Cancer Mother 53     Ovarian  at 57 from it   • Diabetes Father     Review of Systems   Constitutional: Positive for fever and malaise/fatigue. Negative for chills and weight loss.   HENT: Positive for congestion, postnasal drip and sore throat. Negative for sinus pressure.    Eyes: Negative for blurred vision and pain.   Respiratory: Positive for cough. Negative for shortness of breath.    Cardiovascular: Negative for chest pain.   Gastrointestinal: Positive for nausea. Negative for vomiting.   Genitourinary: Negative for hematuria.   Musculoskeletal: Positive for myalgias.   Skin: Negative for rash.   Neurological:  "Positive for headaches. Negative for dizziness, tingling, sensory change, speech change, focal weakness and seizures.   Psychiatric/Behavioral: The patient does not have insomnia.      Allergies   Allergen Reactions   • Nkda [No Known Drug Allergy]    • Other Environmental       Objective:   /85   Pulse 68   Temp 36.3 °C (97.4 °F)   Resp 18   Ht 1.676 m (5' 6\")   Wt 93.4 kg (206 lb)   SpO2 95%   BMI 33.25 kg/m²   Physical Exam   Constitutional: She is oriented to person, place, and time. She appears well-developed and well-nourished. No distress.   HENT:   Head: Normocephalic and atraumatic.   Right Ear: Tympanic membrane normal.   Left Ear: Tympanic membrane normal.   Nose: Nose normal. Right sinus exhibits no maxillary sinus tenderness and no frontal sinus tenderness. Left sinus exhibits no maxillary sinus tenderness and no frontal sinus tenderness.   Mouth/Throat: Uvula is midline, oropharynx is clear and moist and mucous membranes are normal. No posterior oropharyngeal edema, posterior oropharyngeal erythema or tonsillar abscesses. No tonsillar exudate.   Eyes: Conjunctivae and EOM are normal. Pupils are equal, round, and reactive to light. Right eye exhibits no discharge. Left eye exhibits no discharge.   Cardiovascular: Normal rate and regular rhythm.    No murmur heard.  Pulmonary/Chest: Effort normal and breath sounds normal. No respiratory distress. She has no decreased breath sounds. She has no wheezes. She has no rhonchi. She has no rales.   Patient coughing throughout exam   Abdominal: Soft. She exhibits no distension. There is no tenderness.   Neurological: She is alert and oriented to person, place, and time. She has normal strength and normal reflexes. No cranial nerve deficit or sensory deficit. She displays a negative Romberg sign. GCS eye subscore is 4. GCS verbal subscore is 5. GCS motor subscore is 6.   Skin: Skin is warm, dry and intact.   Psychiatric: She has a normal mood and " affect.         Assessment/Plan:   Assessment    1. LRTI (lower respiratory tract infection)  2. Cough  3. Migraine without aura and without status migrainosus, not intractable  - doxycycline (VIBRAMYCIN) 100 MG Tab; Take 1 Tab by mouth 2 times a day for 7 days.  Dispense: 14 Tab; Refill: 0  - guaifenesin-codeine (CHERATUSSIN AC) Solution oral solution; Take 5 mL by mouth every four hours as needed for Cough for up to 3 days.  Dispense: 75 mL; Refill: 0  - MethylPREDNISolone (MEDROL DOSEPAK) 4 MG Tablet Therapy Pack; Take 1 Tab by mouth every day.  Dispense: 1 Kit; Refill: 0    Patient with a bronchospasms and cough causing increase in pain and migraine headache. We'll provide patient with cough suppressant.  Mitro query was performed to review the . The patient appears appropriate to receive medication as prescribed.  Patient currently taking medication for daily migraine headaches.    Patient given precautionary s/sx that mandate immediate follow up and evaluation in the ED. Advised of risks of not doing so.    DDX, Supportive care, and indications for immediate follow-up discussed with patient.    Instructed to return to clinic or nearest emergency department if we are not available for any change in condition, further concerns, or worsening of symptoms.    The patient demonstrated a good understanding and agreed with the treatment plan.

## 2018-04-26 NOTE — LETTER
April 26, 2018         Patient: Sharlene Garcia   YOB: 1960   Date of Visit: 4/26/2018           To Whom it May Concern:    Sharlene Garcia was seen in my clinic on 4/26/2018. She may return to work on 4/28/18.    If you have any questions or concerns, please don't hesitate to call.        Sincerely,           RENUKA Ash.  Electronically Signed

## 2018-06-03 ENCOUNTER — OFFICE VISIT (OUTPATIENT)
Dept: URGENT CARE | Facility: CLINIC | Age: 58
End: 2018-06-03
Payer: COMMERCIAL

## 2018-06-03 VITALS
TEMPERATURE: 97.9 F | HEIGHT: 66 IN | SYSTOLIC BLOOD PRESSURE: 126 MMHG | OXYGEN SATURATION: 96 % | DIASTOLIC BLOOD PRESSURE: 78 MMHG | BODY MASS INDEX: 33.75 KG/M2 | WEIGHT: 210 LBS | RESPIRATION RATE: 14 BRPM | HEART RATE: 79 BPM

## 2018-06-03 DIAGNOSIS — G43.801 OTHER MIGRAINE WITH STATUS MIGRAINOSUS, NOT INTRACTABLE: ICD-10-CM

## 2018-06-03 PROCEDURE — 99214 OFFICE O/P EST MOD 30 MIN: CPT | Mod: 25 | Performed by: PHYSICIAN ASSISTANT

## 2018-06-03 PROCEDURE — 96372 THER/PROPH/DIAG INJ SC/IM: CPT | Performed by: PHYSICIAN ASSISTANT

## 2018-06-03 RX ORDER — KETOROLAC TROMETHAMINE 30 MG/ML
60 INJECTION, SOLUTION INTRAMUSCULAR; INTRAVENOUS ONCE
Status: COMPLETED | OUTPATIENT
Start: 2018-06-03 | End: 2018-06-03

## 2018-06-03 RX ADMIN — KETOROLAC TROMETHAMINE 60 MG: 30 INJECTION, SOLUTION INTRAMUSCULAR; INTRAVENOUS at 11:59

## 2018-06-03 ASSESSMENT — ENCOUNTER SYMPTOMS
NAUSEA: 1
PALPITATIONS: 0
COUGH: 0
VISUAL CHANGE: 0
FEVER: 0
SINUS PRESSURE: 0
WHEEZING: 0
SENSORY CHANGE: 0
TINGLING: 0
VOMITING: 0
ABDOMINAL PAIN: 0
FACIAL SWEATING: 0
BLURRED VISION: 0
SORE THROAT: 0
CHILLS: 0
MIGRAINE HEADACHES: 1
INSOMNIA: 0
SPUTUM PRODUCTION: 0
LOSS OF BALANCE: 0
SHORTNESS OF BREATH: 0
FOCAL WEAKNESS: 0
PHOTOPHOBIA: 1
HEADACHES: 1
EYE PAIN: 0
MYALGIAS: 0
ABNORMAL BEHAVIOR: 0

## 2018-06-03 NOTE — PROGRESS NOTES
Subjective:      Sharlene Garcia is a 58 y.o. female who presents with Migraine            Headache    This is a recurrent (new onset 4 days ago) problem. Episode onset: 4 days. The problem occurs constantly. The problem has been unchanged. The pain is located in the parietal region. The pain does not radiate. The pain quality is similar to prior headaches. The quality of the pain is described as aching. The pain is moderate. Associated symptoms include nausea and photophobia. Pertinent negatives include no abdominal pain, abnormal behavior, blurred vision, coughing, ear pain, eye pain, facial sweating, fever, insomnia, loss of balance, muscle aches, sinus pressure, sore throat, tingling, visual change or vomiting. She has tried NSAIDs and triptans (last dose of IBuprofen last night) for the symptoms. Her past medical history is significant for migraine headaches. There is no history of recent head traumas.     Past Medical History:   Diagnosis Date   • Allergy    • Anxiety     Due to financial issues, never took medication.   • ASTHMA    • Chronic bronchitis     Including multiple episodes of pnemonia, until    • GERD (gastroesophageal reflux disease)     Doing better now with weight loss 6/10.   • Headache(784.0)    • Hyperlipidemia LDL goal < 160 10/28/2010   • Menopausal and perimenopausal disorder    • Migraine     Rare, many years ago.       Past Surgical History:   Procedure Laterality Date   • RECOVERY  10/25/2015    Procedure: [facility] RECOVERY ONLY;  Surgeon: Ir-Recovery Surgery;  Location: SURGERY [facility];  Service:    • CRISTINA BY LAPAROSCOPY  2011    Performed by LUIS ALBERTO ROBLES at SURGERY [facility]   • HYSTEROSCOPY WITH VIDEO OPERATIVE  2010    Performed by PETER MORALES at SURGERY SAME DAY [facility]   • PB  DELIVERY ONLY     • PRIMARY C SECTION     • MAMMOPLASTY AUGMENTATION      And removal calcium deposits   • CO ORAL SURGERY PROCEDURE    "   wisdom teeth extraction   • BUNIONECTOMY  1978/1984    x2   • PB ENLARGE BREAST WITH IMPLANT         Family History   Problem Relation Age of Onset   • Cancer Mother 53     Ovarian  at 57 from it   • Diabetes Father        Allergies   Allergen Reactions   • Nkda [No Known Drug Allergy]    • Other Environmental        Medications, Allergies, and current problem list reviewed today in Epic    Review of Systems   Constitutional: Negative for chills, fever and malaise/fatigue.   HENT: Negative for congestion, ear discharge, ear pain, sinus pressure and sore throat.    Eyes: Positive for photophobia. Negative for blurred vision and pain.   Respiratory: Negative for cough, sputum production, shortness of breath and wheezing.    Cardiovascular: Negative for chest pain, palpitations and leg swelling.   Gastrointestinal: Positive for nausea. Negative for abdominal pain and vomiting.   Musculoskeletal: Negative for myalgias.   Neurological: Positive for headaches. Negative for tingling, sensory change, focal weakness and loss of balance.   Psychiatric/Behavioral: The patient does not have insomnia.        All other systems reviewed and are negative.      Objective:     /78   Pulse 79   Temp 36.6 °C (97.9 °F)   Resp 14   Ht 1.676 m (5' 6\")   Wt 95.3 kg (210 lb)   SpO2 96%   BMI 33.89 kg/m²      Physical Exam   Constitutional: She is oriented to person, place, and time. She appears well-developed and well-nourished. No distress.   HENT:   Head: Normocephalic and atraumatic.   Mouth/Throat: Uvula is midline and oropharynx is clear and moist.   Eyes: Conjunctivae, EOM and lids are normal. Pupils are equal, round, and reactive to light.   Neck: Neck supple.   Cardiovascular: Normal rate, regular rhythm and normal heart sounds.  Exam reveals no gallop and no friction rub.    No murmur heard.  Pulmonary/Chest: Effort normal and breath sounds normal. No respiratory distress. She has no wheezes. She has no rales. "   Lymphadenopathy:     She has no cervical adenopathy.   Neurological: She is alert and oriented to person, place, and time. No cranial nerve deficit.   CN II-XII intact. Cerebellar function  intact. Motor coordination intact.  strength strong and symmetrical bilaterally. Proprioception intact. Strength 5/5 equal in the upper and lower extremities. Facial features symmetric with equal movement. No focal deficits.    Skin: Skin is warm and dry. No rash noted.   Psychiatric: She has a normal mood and affect. Her behavior is normal. Judgment and thought content normal. Her speech is rapid and/or pressured and tangential. Cognition and memory are normal.               Assessment/Plan:     1. Other migraine with status migrainosus, not intractable    - ketorolac (TORADOL) injection 60 mg; 2 mL by Intramuscular route Once.    Patient  given precautionary s/sx that mandate immediate follow up and evaluation in the ED. Advised of risks of not doing so.     Differential diagnoses, Supportive care, and indications for immediate follow-up discussed with patient.   Instructed to return to clinic or nearest emergency department for any change in condition, further concerns, or worsening of symptoms.    The patient demonstrated a good understanding and agreed with the treatment plan.    Zaira Pineda P.A.-C.

## 2018-06-15 ENCOUNTER — NON-PROVIDER VISIT (OUTPATIENT)
Dept: NEUROLOGY | Facility: MEDICAL CENTER | Age: 58
End: 2018-06-15
Payer: COMMERCIAL

## 2018-06-15 DIAGNOSIS — G43.001 MIGRAINE WITHOUT AURA AND WITH STATUS MIGRAINOSUS, NOT INTRACTABLE: ICD-10-CM

## 2018-06-15 DIAGNOSIS — R41.89 COGNITIVE CHANGE: ICD-10-CM

## 2018-06-15 DIAGNOSIS — I67.1 CEREBRAL ANEURYSM WITHOUT RUPTURE: ICD-10-CM

## 2018-06-15 PROCEDURE — 95816 EEG AWAKE AND DROWSY: CPT | Performed by: PSYCHIATRY & NEUROLOGY

## 2018-06-18 NOTE — PROCEDURES
"DATE OF SERVICE:  06/15/2018    This is an outpatient EEG, was done on 06/15/2018.    ROUTINE ELECTROENCEPHALOGRAM REPORT        NAME: Sharlene Garcia     REFERRING Dr: Tutu     DURATION: 26 minutes     INDICATION: EEG for pt w/ daily HA and frequent migraines, pt reports head always hurts and \"feels weird\".   HX of L cartoid aneurysm w/ coiling, anxiety, asthma, GERD        TECHNIQUE: 30 channel routine electroencephalogram (EEG) was performed in accordance with the international 10-20 system. The study was reviewed in bipolar and referential montages. The recording examined the patient during wakeful and drowsy state(s).      DESCRIPTION OF THE RECORD:        Background rhythm during awake stage shows well-organized, well-developed, average voltage 10 to 11 hertz alpha activity in the posterior regions.  It blocks with eye opening and it is bilaterally synchronous and symmetrical.  No spike-and-wave discharges but some short runs of theta/delta over the left.  Photic stimulation did not produce any abnormalities. Hyperventilation did not produce any abnormalities. Stage I sleep was achieved.        ACTIVATION PROCEDURES:       Hyperventilation and Photic Stimulation were done     ICTAL AND/OR INTERICTAL FINDINGS:    No focal or generalized epileptiform activity noted. No regional slowing was seen during this routine study.  No clinical events or seizures were reported or recorded during the study.       EKG: sampling of the EKG recording demonstrated sinus rhythm.          INTERPRETATION:           ________________________________________________________________________     This is abnormal routine EEG recording in the awake and drowsy/sleep state(s).     This scalp EEG denotes                                1. Mild focal cortical dysfunction over left --this patten would increase the risk of seizure - advise clinical correlation regarding management "         ________________________________________________________________________                           ____________________________________     MD ATIYA FOWLER    DD:  06/18/2018 07:01:58  DT:  06/18/2018 07:23:35    D#:  [id number]  Job#:  [id number]

## 2018-06-18 NOTE — PROGRESS NOTES
"ROUTINE ELECTROENCEPHALOGRAM REPORT      NAME: Sharlene Garcia DESIRAE    REFERRING Dr: Tutu    DURATION: 26 minutes    INDICATION: EEG for pt w/ daily HA and frequent migraines, pt reports head always hurts and \"feels weird\".   HX of L cartoid aneurysm w/ coiling, anxiety, asthma, GERD      TECHNIQUE: 30 channel routine electroencephalogram (EEG) was performed in accordance with the international 10-20 system. The study was reviewed in bipolar and referential montages. The recording examined the patient during wakeful and drowsy state(s).     DESCRIPTION OF THE RECORD:      Background rhythm during awake stage shows well-organized, well-developed, average voltage 10 to 11 hertz alpha activity in the posterior regions.  It blocks with eye opening and it is bilaterally synchronous and symmetrical.  No spike-and-wave discharges but some short runs of theta/delta over the left.  Photic stimulation did not produce any abnormalities. Hyperventilation did not produce any abnormalities. Stage I sleep was achieved.      ACTIVATION PROCEDURES:      Hyperventilation and Photic Stimulation were done    ICTAL AND/OR INTERICTAL FINDINGS:    No focal or generalized epileptiform activity noted. No regional slowing was seen during this routine study.  No clinical events or seizures were reported or recorded during the study.      EKG: sampling of the EKG recording demonstrated sinus rhythm.        INTERPRETATION:        ________________________________________________________________________    This is abnormal routine EEG recording in the awake and drowsy/sleep state(s).    This scalp EEG denotes         1. Mild focal cortical dysfunction over left --this patten would increase the risk of seizure - advise clinical correlation regarding management       ________________________________________________________________________                        "

## 2018-06-25 ENCOUNTER — TELEPHONE (OUTPATIENT)
Dept: NEUROLOGY | Facility: MEDICAL CENTER | Age: 58
End: 2018-06-25

## 2018-06-25 NOTE — TELEPHONE ENCOUNTER
Sharlene Garcia sent to  Neurology [facility]   Phone Number: [phone]             My EEG test results came back with no results.  What does that mean.  I am assuming then that the doctor does not need to see me and there are no concerns?????   Your office was so adamant that I get in and have this test and hounded me until I did and now I will hear nothing??????     Sharlene Garcia     Please advise    Thank you

## 2018-06-28 NOTE — TELEPHONE ENCOUNTER
"Tell the patient --- our team do not manage \"the mychart\"     Her EEG results are the following   ________________________________________________________________________       This is abnormal routine EEG recording in the awake and drowsy/sleep state(s).       This scalp EEG denotes                       1. Mild focal cortical dysfunction over left --this patten would increase the risk of seizure - advise clinical correlation regarding management           In the last visit, we add Diamox, did that help?   If her symptoms remain? If yes, does she would like us to add new medication?   We will need the patient's feedback before we could move to the next step.....     Spoke to patient gave above information. She just started taking the Diamox again .Due to cost she only took for 30 days. and cannot remember if it helped.    She stated vertigo gets really bad and comes and goes. The last thing is she has  episodes that her hands and feet go numb at times and her eyes act weird.        "

## 2018-06-29 RX ORDER — DIVALPROEX SODIUM 250 MG/1
250 TABLET, EXTENDED RELEASE ORAL
Qty: 30 TAB | Refills: 4 | Status: SHIPPED | OUTPATIENT
Start: 2018-06-29 | End: 2018-09-07 | Stop reason: SDUPTHER

## 2018-07-02 ENCOUNTER — TELEPHONE (OUTPATIENT)
Dept: NEUROLOGY | Facility: MEDICAL CENTER | Age: 58
End: 2018-07-02

## 2018-07-02 NOTE — TELEPHONE ENCOUNTER
Called and advised the patient to  the Depakote. She stated that she picked up the Diamox for three months. I advised her to grab the depakote so the prescription does not run out, and start that when the Diamox is gone as it is more affordable.

## 2018-07-03 RX ORDER — SUMATRIPTAN 100 MG/1
100 TABLET, FILM COATED ORAL
Qty: 10 TAB | Refills: 3 | Status: SHIPPED | OUTPATIENT
Start: 2018-07-03 | End: 2019-01-25

## 2018-07-03 RX ORDER — IBUPROFEN 800 MG/1
800 TABLET ORAL EVERY 8 HOURS PRN
Qty: 10 TAB | Refills: 3 | Status: SHIPPED | OUTPATIENT
Start: 2018-07-03 | End: 2019-01-25

## 2018-07-09 RX ORDER — TOPIRAMATE 100 MG/1
100 TABLET, FILM COATED ORAL 2 TIMES DAILY
Qty: 180 TAB | Refills: 1 | Status: SHIPPED | OUTPATIENT
Start: 2018-07-09 | End: 2018-09-07 | Stop reason: SDUPTHER

## 2018-09-07 ENCOUNTER — OFFICE VISIT (OUTPATIENT)
Dept: NEUROLOGY | Facility: MEDICAL CENTER | Age: 58
End: 2018-09-07
Payer: COMMERCIAL

## 2018-09-07 VITALS
RESPIRATION RATE: 16 BRPM | BODY MASS INDEX: 30.34 KG/M2 | SYSTOLIC BLOOD PRESSURE: 130 MMHG | HEART RATE: 69 BPM | TEMPERATURE: 98.4 F | OXYGEN SATURATION: 98 % | DIASTOLIC BLOOD PRESSURE: 80 MMHG | HEIGHT: 66 IN | WEIGHT: 188.8 LBS

## 2018-09-07 DIAGNOSIS — I67.1 CEREBRAL ANEURYSM WITHOUT RUPTURE: ICD-10-CM

## 2018-09-07 DIAGNOSIS — G43.709 CHRONIC MIGRAINE W/O AURA W/O STATUS MIGRAINOSUS, NOT INTRACTABLE: ICD-10-CM

## 2018-09-07 DIAGNOSIS — G43.011 INTRACTABLE MIGRAINE WITHOUT AURA AND WITH STATUS MIGRAINOSUS: ICD-10-CM

## 2018-09-07 PROCEDURE — 99214 OFFICE O/P EST MOD 30 MIN: CPT | Performed by: PSYCHIATRY & NEUROLOGY

## 2018-09-07 RX ORDER — DIVALPROEX SODIUM 250 MG/1
250 TABLET, EXTENDED RELEASE ORAL 2 TIMES DAILY
Qty: 60 TAB | Refills: 6 | Status: SHIPPED | OUTPATIENT
Start: 2018-09-07 | End: 2019-06-04 | Stop reason: SDUPTHER

## 2018-09-07 RX ORDER — TOPIRAMATE 100 MG/1
100 TABLET, FILM COATED ORAL 2 TIMES DAILY
Qty: 180 TAB | Refills: 1 | Status: SHIPPED | OUTPATIENT
Start: 2018-09-07 | End: 2019-05-18 | Stop reason: SDUPTHER

## 2018-09-07 NOTE — PROGRESS NOTES
NEUROLOGY NOTE    Referring Physician  Alberto Gandara M.D.      CHIEF COMPLAINT:  Headache responded with Topamax  Chief Complaint   Patient presents with   • Follow-Up     Tinnitus of both ears,cerebral aneurysm       PRESENT ILLNESS:       This 58-year-old woman who has had a history of migraines    1. Migraine under Topamax, without Topamax, headache became worse  2. Aneurysm status post coiling -- monitored by Dr Hazel every year ( MRI compatible)  3. Claustrophobia ( needs Xanax 2mg before procedure)  4. Chiari I malformation. Stable finding ( coughing did make her headache worse)  5. Tinnitus, Hearing loss    Headache  1. Location-- vertex, all over temporal-- changing  Location  2. Characteristics- throbbing  3. Associated--light   4. Worsening-- N/A  5. Relieving factors--   Rescue medicine N/A   6. Family History-  7. Every day- all day long  8. Severity-- can not work  9. Sleep-- no problem  10. Coffee intake-- not coffee addict      The patient just had one ENT referral recently      PAST MEDICAL HISTORY:  Past Medical History:   Diagnosis Date   • Allergy    • Anxiety     Due to financial issues, never took medication.   • ASTHMA    • Chronic bronchitis     Including multiple episodes of pnemonia, until    • GERD (gastroesophageal reflux disease)     Doing better now with weight loss 6/10.   • Headache(784.0)    • Hyperlipidemia LDL goal < 160 10/28/2010   • Menopausal and perimenopausal disorder    • Migraine     Rare, many years ago.       PAST SURGICAL HISTORY:  Past Surgical History:   Procedure Laterality Date   • RECOVERY  10/25/2015    Procedure: [facility];  Surgeon: [facility]-Recovery Surgery;  Location: SURGERY [facility];  Service:    • CRISTINA BY LAPAROSCOPY  2011    Performed by LUIS ALBERTO ROBLES at SURGERY [facility]   • HYSTEROSCOPY WITH VIDEO OPERATIVE  2010    Performed by PETER MORALES at SURGERY [facility]S   • PB  DELIVERY  ONLY     • PRIMARY C SECTION     • MAMMOPLASTY AUGMENTATION      And removal calcium deposits   • [place] ORAL SURGERY PROCEDURE      wisdom teeth extraction   • BUNIONECTOMY  1978/1984    x2   • PB ENLARGE BREAST WITH IMPLANT         FAMILY HISTORY:  Family History   Problem Relation Age of Onset   • Cancer Mother 53        Ovarian  at 57 from it   • Diabetes Father        SOCIAL HISTORY:  Social History     Social History   • Marital status:      Spouse name: N/A   • Number of children: N/A   • Years of education: N/A     Occupational History   • Not on file.     Social History Main Topics   • Smoking status: Never Smoker   • Smokeless tobacco: Never Used   • Alcohol use 0.0 - 0.5 oz/week      Comment: once a week   • Drug use: No   • Sexual activity: Yes     Partners: Male     Birth control/ protection: Post-Menopausal      Comment: menopausal since      Other Topics Concern   • Not on file     Social History Narrative   • No narrative on file     ALLERGIES:  Allergies   Allergen Reactions   • Nkda [No Known Drug Allergy]    • Other Environmental      TOBHX  History   Smoking Status   • Never Smoker   Smokeless Tobacco   • Never Used     ALCHX  History   Alcohol Use   • 0.0 - 0.5 oz/week     Comment: once a week     DRUGHX  History   Drug Use No           MEDICATIONS:  Current Outpatient Prescriptions   Medication   • topiramate (TOPAMAX) 100 MG Tab   • acetaZOLAMIDE (DIAMOX) 250 MG Tab   • cetirizine (ZYRTEC) 10 MG Tab   • diphenhydrAMINE (BENADRYL) 25 MG Tab   • sumatriptan (IMITREX) 100 MG tablet   • ibuprofen (MOTRIN) 800 MG Tab   • divalproex ER (DEPAKOTE ER) 250 MG TABLET SR 24 HR   • MethylPREDNISolone (MEDROL DOSEPAK) 4 MG Tablet Therapy Pack   • acetaminophen/caffeine/butalbital 325-40-50 mg (FIORICET) -40 MG Tab   • acetaminophen (TYLENOL) 325 MG Tab   • naproxen (NAPROSYN) 500 MG Tab   • topiramate (TOPAMAX) 50 MG tablet   • ondansetron (ZOFRAN ODT) 4 MG TABLET  "DISPERSIBLE   • atorvastatin (LIPITOR) 20 MG Tab   • potassium chloride SA (KDUR) 20 MEQ Tab CR     No current facility-administered medications for this visit.        REVIEW OF SYSTEM:    Constitutional: Denies fevers, Denies weight changes   Eyes: Denies changes in vision, no eye pain   Ears/Nose/Throat/Mouth: Denies nasal congestion or sore throat   Cardiovascular: Denies chest pain or palpitations   Respiratory: Denies SOB.   Gastrointestinal/Hepatic: Denies abdominal pain, nausea, vomiting, diarrhea, constipation or GI bleeding   Genitourinary: Denies bladder dysfunction, dysuria or frequency   Musculoskeletal/Rheum: Denies joint pain and swelling   Skin/Breast: Denies rash, denies breast lumps or discharge   Neurological: aneurysm status post coil  Psychiatric: denies mood disorder   Endocrine: denies hx of diabetes or thyroid dysfunction   Heme/Oncology/Lymph Nodes: Denies enlarged lymph nodes, denies brusing or known bleeding disorder   Allergic/Immunologic: Denies hx of allergies         PHYSICAL AND NEUROLOGICAL EXMAINATIONS:  VITAL SIGNS: /80   Pulse 69   Temp 36.9 °C (98.4 °F)   Resp 16   Ht 1.676 m (5' 6\")   Wt 85.6 kg (188 lb 12.8 oz)   SpO2 98%   BMI 30.47 kg/m²   CURRENT WEIGHT: [unfilled]  BMI: Body mass index is 30.47 kg/m².  PREVIOUS WEIGHTS:  Wt Readings from Last 25 Encounters:   09/07/18 85.6 kg (188 lb 12.8 oz)   06/03/18 95.3 kg (210 lb)   04/26/18 93.4 kg (206 lb)   04/12/18 89.4 kg (197 lb)   01/18/18 87.4 kg (192 lb 9.6 oz)   09/19/17 89.5 kg (197 lb 5 oz)   09/19/17 89.3 kg (196 lb 13.9 oz)   03/29/17 81.6 kg (180 lb)   12/01/16 89.7 kg (197 lb 12.8 oz)   08/31/16 88.6 kg (195 lb 6.4 oz)   06/29/16 88.5 kg (195 lb 3.2 oz)   05/11/16 78 kg (172 lb)   04/22/16 78 kg (172 lb)   10/26/15 81.2 kg (179 lb 0.2 oz)   10/24/15 78 kg (172 lb)   11/16/11 77.1 kg (170 lb)   10/10/11 78.5 kg (173 lb)   11/29/10 76.2 kg (168 lb)   10/05/10 75.8 kg (167 lb 3.2 oz)   03/26/10 83.5 kg (184 lb)   "       General appearance of patient: WDWN(+) NAD(+)    EYES  o Fundus : Papilledem(-) Exudates(-) Hemorrhage(-)  Nervous System  Orientation to time, place and person(+)  Memory normal(+)  Language: aphasia(-)  Knowledge: past(+) Current(+)  Attention(+)  Cranial Nerves  • Nerve 2: intact  • Nerve 3,4,6: intact  • Nerve 5 : intact  • Nerve 7: intact  • Nerve 8: intact  • Nerve 9 & 10: intact  • Nerve 11: intact  • Nerve 12: intact  Muscle Power and muscle tone: symmetric, normal in upper and lower  Sensory System: Pin sensation intact(+)  Reflexes: symmetric throughout  Cerebellar Function FNP normal   Gait : Steady(+) TandemGait steady(+)  Heart and Vascular  Peripheral Vasucular system : Edema (-) Swelling(-)  RHB, Breathing sound clear  abdomen bowel sound normoactive  Extremities freely moveable  Joints no contracture       NEUROIMAGING: I reviewed the MRI/CT of brain   First of all, I do not favor xanax, valium for tinnitus  Advise dietary restrictions      ________________________________________________________________________      ________________________________________________________________________        IMPRESSION:    1. Migraine under Topamax, without Topamax, headache became worse  2. Aneurysm status post coiling -- monitored by Dr Hazel every year ( MRI compatible)  3. Claustrophobia ( needs Xanax 2mg before procedure)  4. Chiari I malformation. Stable finding ( coughing did make her headache worse)  5. Abnormal EEG      PLAN/RECOMMENDATIONS:    The major concerns today are memory problems, funny spells of pain and hurting    Regarding the tinnitus, advise      1. ENT consultation-- audiology referral, hearing aids   2. Abnormal EEG    3. Diet Modification-- see reference    We will continue Topamax 100mg two times per day  Up Depakote to 250mg two times per day  STOP Diamox (Acetazolamide)      This time, we will also try  ONB- occipital nerve block and or botox injection may be an option in the  future too-- it would take a couple of trials before we can conclude that these injections does not help either        CC:  Alberto Gandara M.D.      9/2017  1.  Minimal supratentorial white matter disease most consistent with microvascular ischemic change versus demyelination or gliosis. Common findings for the patient's age.  2.  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 No evidence of intercurrent hemorrhage. No significant change.  3.  Chiari I malformation. Stable finding.  4.  Status post coiling supraclinoid left carotid terminus aneurysm. Expected susceptibility artifact from the coil pack. No definite recurrent aneurysm, however, MRA would be a better way to evaluate.            ________________________________________________________________________  REFERENCE ARNOLD-CHIARI TYPE I  ROUTINE ELECTROENCEPHALOGRAM REPORT      NAME: Sharlene Garcia      INTERPRETATION:        ________________________________________________________________________    This is abnormal routine EEG recording in the awake and drowsy/sleep state(s).    This scalp EEG denotes         1. Mild focal cortical dysfunction over left --this patten would increase the risk of seizure - advise clinical correlation regarding management       ________________________________________________________________________

## 2018-09-07 NOTE — PATIENT INSTRUCTIONS
________________________________________________________________________        IMPRESSION:    1. Migraine under Topamax, without Topamax, headache became worse  2. Aneurysm status post coiling -- monitored by Dr Hazel every year ( MRI compatible)  3. Claustrophobia ( needs Xanax 2mg before procedure)  4. Chiari I malformation. Stable finding ( coughing did make her headache worse)  5. Abnormal EEG      PLAN/RECOMMENDATIONS:    The major concerns today are memory problems, funny spells of pain and hurting    Regarding the tinnitus, advise      1. ENT consultation-- audiology referral, hearing aids   2. Abnormal EEG    3. Diet Modification-- see reference    We will continue Topamax 100mg two times per day  Up Depakote to 250mg two times per day  STOP Diamox (Acetazolamide)      This time, we will also try  ONB- occipital nerve block and or botox injection may be an option in the future too-- it would take a couple of trials before we can conclude that these injections does not help either        CC:  Alberto Gandara M.D.      9/2017  1.  Minimal supratentorial white matter disease most consistent with microvascular ischemic change versus demyelination or gliosis. Common findings for the patient's age.  2.  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 No evidence of intercurrent hemorrhage. No significant change.  3.  Chiari I malformation. Stable finding.  4.  Status post coiling supraclinoid left carotid terminus aneurysm. Expected susceptibility artifact from the coil pack. No definite recurrent aneurysm, however, MRA would be a better way to evaluate.

## 2018-09-10 DIAGNOSIS — I67.1 INTRACRANIAL ANEURYSM: ICD-10-CM

## 2018-09-11 ENCOUNTER — TELEPHONE (OUTPATIENT)
Dept: NEUROLOGY | Facility: MEDICAL CENTER | Age: 58
End: 2018-09-11

## 2018-09-11 NOTE — TELEPHONE ENCOUNTER
Sharlene ARIZMENDI Neurology [facility]   Phone Number: [phone]             I JUST RECEIVED MY APPT FOR MY MRI / MRA.  I WILL NEED A PRESCRIPTION FOR XANEX FOR THE DAY OF THE PROCEEDURE.     I HAVE SOME SERIOUS CLAUSTROPHOBIA AND REQUIRE XANEX FOR THE MRI.       MRI schedule for 09/22/2018.    Please advise

## 2018-09-13 ENCOUNTER — TELEPHONE (OUTPATIENT)
Dept: NEUROLOGY | Facility: MEDICAL CENTER | Age: 58
End: 2018-09-13

## 2018-09-13 DIAGNOSIS — F40.240 CLAUSTROPHOBIA: ICD-10-CM

## 2018-09-13 RX ORDER — ALPRAZOLAM 0.5 MG/1
0.5 TABLET ORAL
Qty: 2 TAB | Refills: 0 | Status: SHIPPED | OUTPATIENT
Start: 2018-09-13 | End: 2018-09-14

## 2018-09-13 NOTE — TELEPHONE ENCOUNTER
ALPRAZolam (XANAX) 0.5 MG Tab 9/13/2018 9/14/2018 9/13/2018          Take 1 Tab by mouth 1 time daily as needed for Sleep for up to 1 day.        Above script called into [facility] spoke to [facility].  Patient notified

## 2018-09-17 ENCOUNTER — TELEPHONE (OUTPATIENT)
Dept: NEUROLOGY | Facility: MEDICAL CENTER | Age: 58
End: 2018-09-17

## 2018-09-17 NOTE — TELEPHONE ENCOUNTER
Patient called stating 0.5 mg of Alprazolam will not help her. She states Dr Vides would give her 0.25 mg. She would like new script sent into pharmacy. Her MRI is for this Saturday.    Please advise    Thank you    Spoke to patient she states she needs stronger strength. Dr Vides gave her double the amount that was odered by you. She is very claustrophobic and needs to be able to fall asleep during testing.    Please advise          Patient had MRI needs results.

## 2018-09-22 ENCOUNTER — HOSPITAL ENCOUNTER (OUTPATIENT)
Dept: RADIOLOGY | Facility: MEDICAL CENTER | Age: 58
End: 2018-09-22
Attending: PSYCHIATRY & NEUROLOGY
Payer: COMMERCIAL

## 2018-09-22 DIAGNOSIS — G43.011 INTRACTABLE MIGRAINE WITHOUT AURA AND WITH STATUS MIGRAINOSUS: ICD-10-CM

## 2018-09-22 DIAGNOSIS — I67.1 CEREBRAL ANEURYSM WITHOUT RUPTURE: ICD-10-CM

## 2018-09-22 PROCEDURE — 70551 MRI BRAIN STEM W/O DYE: CPT

## 2018-09-22 PROCEDURE — 70544 MR ANGIOGRAPHY HEAD W/O DYE: CPT

## 2018-09-25 ENCOUNTER — TELEPHONE (OUTPATIENT)
Dept: NEUROLOGY | Facility: MEDICAL CENTER | Age: 58
End: 2018-09-25

## 2018-09-25 DIAGNOSIS — I72.9 ANEURYSM (HCC): ICD-10-CM

## 2018-09-25 NOTE — TELEPHONE ENCOUNTER
The radiologist who interpreted her MRA  Would recommend the patient to see interventional radiologist      Because the following    MR angiography    1.  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    2.  Consider neurointerventional radiology consult.    Please tell her, I already put the IR referral in [facility]

## 2018-09-28 ENCOUNTER — APPOINTMENT (OUTPATIENT)
Dept: NEUROLOGY | Facility: MEDICAL CENTER | Age: 58
End: 2018-09-28
Payer: COMMERCIAL

## 2018-10-08 ENCOUNTER — TELEPHONE (OUTPATIENT)
Dept: NEUROLOGY | Facility: MEDICAL CENTER | Age: 58
End: 2018-10-08

## 2018-10-19 ENCOUNTER — APPOINTMENT (OUTPATIENT)
Dept: NEUROLOGY | Facility: MEDICAL CENTER | Age: 58
End: 2018-10-19
Payer: COMMERCIAL

## 2018-11-26 ENCOUNTER — TELEPHONE (OUTPATIENT)
Dept: NEUROLOGY | Facility: MEDICAL CENTER | Age: 58
End: 2018-11-26

## 2018-11-27 ENCOUNTER — PATIENT MESSAGE (OUTPATIENT)
Dept: NEUROLOGY | Facility: MEDICAL CENTER | Age: 58
End: 2018-11-27

## 2018-11-30 ENCOUNTER — OFFICE VISIT (OUTPATIENT)
Dept: NEUROLOGY | Facility: MEDICAL CENTER | Age: 58
End: 2018-11-30
Payer: COMMERCIAL

## 2018-11-30 VITALS
OXYGEN SATURATION: 98 % | DIASTOLIC BLOOD PRESSURE: 86 MMHG | BODY MASS INDEX: 30.93 KG/M2 | SYSTOLIC BLOOD PRESSURE: 134 MMHG | TEMPERATURE: 97.7 F | HEIGHT: 66 IN | WEIGHT: 192.46 LBS | HEART RATE: 69 BPM

## 2018-11-30 PROCEDURE — 64405 NJX AA&/STRD GR OCPL NRV: CPT | Mod: 50 | Performed by: PHYSICIAN ASSISTANT

## 2018-11-30 PROCEDURE — S0020 INJECTION, BUPIVICAINE HYDRO: HCPCS | Performed by: PHYSICIAN ASSISTANT

## 2018-11-30 RX ORDER — BUPIVACAINE HYDROCHLORIDE 5 MG/ML
10 INJECTION, SOLUTION EPIDURAL; INTRACAUDAL ONCE
Status: COMPLETED | OUTPATIENT
Start: 2018-11-30 | End: 2018-11-30

## 2018-11-30 RX ORDER — TRIAMCINOLONE ACETONIDE 40 MG/ML
80 INJECTION, SUSPENSION INTRA-ARTICULAR; INTRAMUSCULAR ONCE
Status: COMPLETED | OUTPATIENT
Start: 2018-11-30 | End: 2018-11-30

## 2018-11-30 RX ADMIN — TRIAMCINOLONE ACETONIDE 80 MG: 40 INJECTION, SUSPENSION INTRA-ARTICULAR; INTRAMUSCULAR at 09:44

## 2018-11-30 RX ADMIN — BUPIVACAINE HYDROCHLORIDE 10 ML: 5 INJECTION, SOLUTION EPIDURAL; INTRACAUDAL at 09:44

## 2018-11-30 NOTE — PROGRESS NOTES
Est patient of Dr. Cam here today for initial botox.    We offered patient 6 injections but she is very fearful of getting shots - very anxious.  We made decision to just do 2 shots today as a result.  She may ultimately need to return for more injections but this way we can try at least 2 and she has less anxiety over process.    BONB procedure:    I performed a bilateral occipital nerve block in clinic today, injecting bupivacaine [5] cc and triamcinolone [40] mg in both sides.    Prior to performing the procedure, the risks and side-effects were discussed with patient including risk for infection, pain, loss of hair, worsening of symptoms.    The patient tolerated the procedure well; there were no complications.    Pt was encouraged to schedule a follow up with their regular neurologist.

## 2018-12-03 ENCOUNTER — OFFICE VISIT (OUTPATIENT)
Dept: CARDIOLOGY | Facility: MEDICAL CENTER | Age: 58
End: 2018-12-03
Payer: COMMERCIAL

## 2018-12-03 VITALS
HEART RATE: 66 BPM | DIASTOLIC BLOOD PRESSURE: 80 MMHG | HEIGHT: 66 IN | WEIGHT: 185 LBS | SYSTOLIC BLOOD PRESSURE: 136 MMHG | BODY MASS INDEX: 29.73 KG/M2

## 2018-12-03 DIAGNOSIS — I20.89 OTHER FORMS OF ANGINA PECTORIS: ICD-10-CM

## 2018-12-03 DIAGNOSIS — R07.89 OTHER CHEST PAIN: ICD-10-CM

## 2018-12-03 DIAGNOSIS — I20.1 ANGINA PECTORIS, VARIANT (HCC): ICD-10-CM

## 2018-12-03 DIAGNOSIS — E78.5 HYPERLIPIDEMIA WITH TARGET LDL LESS THAN 160: ICD-10-CM

## 2018-12-03 PROCEDURE — 99204 OFFICE O/P NEW MOD 45 MIN: CPT | Performed by: INTERNAL MEDICINE

## 2018-12-03 PROCEDURE — 93000 ELECTROCARDIOGRAM COMPLETE: CPT | Performed by: INTERNAL MEDICINE

## 2018-12-03 RX ORDER — NITROGLYCERIN 0.4 MG/1
0.4 TABLET SUBLINGUAL PRN
Qty: 25 TAB | Refills: 11 | Status: SHIPPED | OUTPATIENT
Start: 2018-12-03 | End: 2019-01-25

## 2018-12-03 RX ORDER — LEVOTHYROXINE SODIUM 0.05 MG/1
50 TABLET ORAL
COMMUNITY

## 2018-12-03 ASSESSMENT — ENCOUNTER SYMPTOMS
HEARTBURN: 0
SHORTNESS OF BREATH: 0
CHILLS: 0
DEPRESSION: 0
FEVER: 0
HEADACHES: 1
NERVOUS/ANXIOUS: 0
DIZZINESS: 0
COUGH: 0
NAUSEA: 0
MYALGIAS: 0
BRUISES/BLEEDS EASILY: 0
PND: 0
BLURRED VISION: 0
PALPITATIONS: 0
BACK PAIN: 1
EYE DISCHARGE: 0

## 2018-12-03 NOTE — LETTER
[facility]   [address], [address]  [place] [address]  Phone: [phone]  Fax: [phone]              Sharlene Garcia  1960    Encounter Date: 12/3/2018    Leno Becerril M.D.          PROGRESS NOTE:  Chief Complaint   Patient presents with   • Chest Pain       Subjective:   Sharlene Garcia is a 58 y.o. female who presents today in consultation at the request of Dr. Gandara for angina pectoris.  For 18-24 months the patient has had stereotyped episodes of the spontaneous appearance of the jaw discomfort and radiating down the throat into the central chest.  He can last 5-30 minutes.  It occurs spontaneously.  She has to stop desk work and leaning forward in her desk and hope it goes away.  He does not vary with breathing.  It is not related to rapid heart action.  It is not associated with dysphagia, nausea or shortness of breath.  About 2 weeks ago she was awakened at 3 AM because her  went to work at the early time and she developed a typical symptom.  Otherwise the episodes have not awakened her from sleep.  She has not tried to swallow food or liquid during an episode.  The episodes are becoming significantly more frequent and are more severe.    Recent lipid panel demonstrates a very high LDL of 188.  Also TSH is mildly abnormal.  Statin therapy and thyroid supplement has been started in the last few days.    She is prone to migraine headaches.  As noted below she has had embolization intracerebral aneurysm with good results.  Prior history of gastroesophageal reflux symptoms are not typical of the above syndrome.    Office EKG today is reviewed and is normal    Past Medical History:   Diagnosis Date   • Allergy    • Anxiety     Due to financial issues, never took medication.   • ASTHMA    • Chronic bronchitis     Including multiple episodes of pnemonia, until 2001   • GERD (gastroesophageal reflux disease) 2005    Doing better now with weight  loss 6/10.   • Headache(784.0)    • Hyperlipidemia LDL goal < 160 10/28/2010   • Menopausal and perimenopausal disorder    • Migraine     Rare, many years ago.     Past Surgical History:   Procedure Laterality Date   • RECOVERY  10/25/2015    Procedure: [facility] RECOVERY ONLY;  Surgeon: Ir-Recovery Surgery;  Location: SURGERY [facility];  Service:    • CRISTINA BY LAPAROSCOPY  2011    Performed by LUIS ALBERTO ROBLES at SURGERY [facility]   • HYSTEROSCOPY WITH VIDEO OPERATIVE  2010    Performed by PETER MORALES at SURGERY SAME DAY [facility] ORS   • PB  DELIVERY ONLY     • PRIMARY C SECTION     • MAMMOPLASTY AUGMENTATION      And removal calcium deposits   • RI ORAL SURGERY PROCEDURE      wisdom teeth extraction   • BUNIONECTOMY  1978/1984    x2   • PB ENLARGE BREAST WITH IMPLANT       Family History   Problem Relation Age of Onset   • Cancer Mother 53        Ovarian  at 57 from it   • Diabetes Father      Social History     Social History   • Marital status:      Spouse name: N/A   • Number of children: N/A   • Years of education: N/A     Occupational History   • Not on file.     Social History Main Topics   • Smoking status: Never Smoker   • Smokeless tobacco: Never Used   • Alcohol use 0.0 - 0.5 oz/week      Comment: once a week   • Drug use: No   • Sexual activity: Yes     Partners: Male     Birth control/ protection: Post-Menopausal      Comment: menopausal since      Other Topics Concern   • Not on file     Social History Narrative   • No narrative on file     Allergies   Allergen Reactions   • Nkda [No Known Drug Allergy]    • Other Environmental      Outpatient Encounter Prescriptions as of 12/3/2018   Medication Sig Dispense Refill   • levothyroxine (SYNTHROID) 50 MCG Tab Take 50 mcg by mouth Every morning on an empty stomach.     • nitroglycerin (NITROSTAT) 0.4 MG SL Tab Place 1 Tab under tongue as needed for Chest Pain. 25 Tab 11   • divalproex ER  (DEPAKOTE ER) 250 MG TABLET SR 24 HR Take 1 Tab by mouth 2 Times a Day. 60 Tab 6   • topiramate (TOPAMAX) 100 MG Tab Take 1 Tab by mouth 2 Times a Day. 180 Tab 1   • sumatriptan (IMITREX) 100 MG tablet Take 1 Tab by mouth Once PRN for Migraine for up to 1 dose. 10 Tab 3   • ibuprofen (MOTRIN) 800 MG Tab Take 1 Tab by mouth every 8 hours as needed for Headache. 10 Tab 3   • acetaminophen/caffeine/butalbital 325-40-50 mg (FIORICET) -40 MG Tab Take 1 Tab by mouth every four hours as needed for Headache. 30 Tab 0   • acetaminophen (TYLENOL) 325 MG Tab Take 2 Tabs by mouth every four hours as needed for Fever or Mild Pain. 30 Tab 0   • naproxen (NAPROSYN) 500 MG Tab Take 1 Tab by mouth 2 Times a Day. 60 Tab    • ondansetron (ZOFRAN ODT) 4 MG TABLET DISPERSIBLE Take 1 Tab by mouth every four hours as needed for Nausea/Vomiting (give PO if no IV route available). 10 Tab 0   • cetirizine (ZYRTEC) 10 MG Tab Take 1 Tab by mouth every day. 30 Tab    • atorvastatin (LIPITOR) 20 MG Tab Take 1 Tab by mouth every bedtime. 30 Tab    • diphenhydrAMINE (BENADRYL) 25 MG Tab Take 1 tablet by mouth at bedtime as needed.  May repeat 1 time. (insomnia). 30 Tab 0   • [DISCONTINUED] MethylPREDNISolone (MEDROL DOSEPAK) 4 MG Tablet Therapy Pack Take 1 Tab by mouth every day. (Patient not taking: Reported on 12/3/2018) 1 Kit 0   • [DISCONTINUED] potassium chloride SA (KDUR) 20 MEQ Tab CR Take 1 Tab by mouth every day. (Patient not taking: Reported on 12/3/2018) 60 Tab 11     No facility-administered encounter medications on file as of 12/3/2018.      Review of Systems   Constitutional: Negative for chills, fever and malaise/fatigue.   Eyes: Negative for blurred vision and discharge.   Respiratory: Negative for cough and shortness of breath.    Cardiovascular: Positive for chest pain. Negative for palpitations, leg swelling and PND.   Gastrointestinal: Negative for heartburn and nausea.   Genitourinary: Negative for dysuria and urgency.    "  Musculoskeletal: Positive for back pain. Negative for myalgias.   Skin: Negative for itching and rash.   Neurological: Positive for headaches. Negative for dizziness.   Endo/Heme/Allergies: Negative for environmental allergies. Does not bruise/bleed easily.   Psychiatric/Behavioral: Negative for depression. The patient is not nervous/anxious.         Objective:   /80 (BP Location: Left arm, Patient Position: Sitting, BP Cuff Size: Large adult)   Pulse 66   Ht 1.676 m (5' 6\")   Wt 83.9 kg (185 lb)   BMI 29.86 kg/m²      Physical Exam   Constitutional: She is oriented to person, place, and time. She appears well-developed and well-nourished.   Cardiovascular: Normal rate and regular rhythm.  Exam reveals no gallop and no friction rub.    No murmur heard.  No carotid bruits   Pulmonary/Chest: Effort normal and breath sounds normal.   Abdominal: Soft. There is no tenderness.   Musculoskeletal: She exhibits no edema or deformity.   Neurological: She is alert and oriented to person, place, and time.   Skin: Skin is warm and dry.       Assessment:     1. Other chest pain  EKG   2. Angina pectoris, variant (HCC)     3. Hyperlipidemia with target LDL less than 160     4. Other forms of angina pectoris (HCC)  NM-CARDIAC STRESS TEST       Medical Decision Making:  Today's Assessment / Status / Plan:   The syndrome is compatible with coronary artery spasm but certainly I am concerned also by the high LDL cholesterol and the possibility of fixed coronary stenosis.  She is already taking aspirin.  I have ordered sublingual nitroglycerin as a diagnostic agent for her syndrome  I have also ordered a chemical MPI with close follow-up thereafter.  Thank you for this consultation.      Alberto Gandara M.D.  [address] [place] [address] [address]  [address]  [place] [id number]  VIA Facsimile: [phone]                 "

## 2018-12-04 ENCOUNTER — TELEPHONE (OUTPATIENT)
Dept: CARDIOLOGY | Facility: MEDICAL CENTER | Age: 58
End: 2018-12-04

## 2018-12-04 LAB — EKG IMPRESSION: NORMAL

## 2018-12-04 NOTE — TELEPHONE ENCOUNTER
Ms. Garcia called me today to schedule her mpi. She left me a vm but when I called her back I was unable to leave a vm as her mailbox is full. 12/04 dl

## 2018-12-04 NOTE — PROGRESS NOTES
Chief Complaint   Patient presents with   • Chest Pain       Subjective:   Sharlene Garcia is a 58 y.o. female who presents today in consultation at the request of Dr. Gandara for angina pectoris.  For 18-24 months the patient has had stereotyped episodes of the spontaneous appearance of the jaw discomfort and radiating down the throat into the central chest.  He can last 5-30 minutes.  It occurs spontaneously.  She has to stop desk work and leaning forward in her desk and hope it goes away.  He does not vary with breathing.  It is not related to rapid heart action.  It is not associated with dysphagia, nausea or shortness of breath.  About 2 weeks ago she was awakened at 3 AM because her  went to work at the early time and she developed a typical symptom.  Otherwise the episodes have not awakened her from sleep.  She has not tried to swallow food or liquid during an episode.  The episodes are becoming significantly more frequent and are more severe.    Recent lipid panel demonstrates a very high LDL of 188.  Also TSH is mildly abnormal.  Statin therapy and thyroid supplement has been started in the last few days.    She is prone to migraine headaches.  As noted below she has had embolization intracerebral aneurysm with good results.  Prior history of gastroesophageal reflux symptoms are not typical of the above syndrome.    Office EKG today is reviewed and is normal    Past Medical History:   Diagnosis Date   • Allergy    • Anxiety     Due to financial issues, never took medication.   • ASTHMA    • Chronic bronchitis     Including multiple episodes of pnemonia, until 2001   • GERD (gastroesophageal reflux disease) 2005    Doing better now with weight loss 6/10.   • Headache(784.0)    • Hyperlipidemia LDL goal < 160 10/28/2010   • Menopausal and perimenopausal disorder 2005   • Migraine     Rare, many years ago.     Past Surgical History:   Procedure Laterality Date   • RECOVERY  10/25/2015    Procedure: [facility]  RECOV[facility] ONLY;  Surgeon: Ir-Recovery Surgery;  Location: SURGERY [facility] ORS;  Service:    • CRISTINA BY LAPAROSCOPY  2011    Performed by LUIS ALBERTO ROBLES at SURGERY [facility]S   • HYSTEROSCOPY WITH VIDEO OPERATIVE  2010    Performed by PETER MORALES at SURGERY SAME DAY [facility] ORS   • PB  DELIVERY ONLY     • PRIMARY C SECTION     • MAMMOPLASTY AUGMENTATION      And removal calcium deposits   • FL ORAL SURGERY PROCEDURE      wisdom teeth extraction   • BUNIONECTOMY  1978/1984    x2   • PB ENLARGE BREAST WITH IMPLANT       Family History   Problem Relation Age of Onset   • Cancer Mother 53        Ovarian  at 57 from it   • Diabetes Father      Social History     Social History   • Marital status:      Spouse name: N/A   • Number of children: N/A   • Years of education: N/A     Occupational History   • Not on file.     Social History Main Topics   • Smoking status: Never Smoker   • Smokeless tobacco: Never Used   • Alcohol use 0.0 - 0.5 oz/week      Comment: once a week   • Drug use: No   • Sexual activity: Yes     Partners: Male     Birth control/ protection: Post-Menopausal      Comment: menopausal since      Other Topics Concern   • Not on file     Social History Narrative   • No narrative on file     Allergies   Allergen Reactions   • Nkda [No Known Drug Allergy]    • Other Environmental      Outpatient Encounter Prescriptions as of 12/3/2018   Medication Sig Dispense Refill   • levothyroxine (SYNTHROID) 50 MCG Tab Take 50 mcg by mouth Every morning on an empty stomach.     • nitroglycerin (NITROSTAT) 0.4 MG SL Tab Place 1 Tab under tongue as needed for Chest Pain. 25 Tab 11   • divalproex ER (DEPAKOTE ER) 250 MG TABLET SR 24 HR Take 1 Tab by mouth 2 Times a Day. 60 Tab 6   • topiramate (TOPAMAX) 100 MG Tab Take 1 Tab by mouth 2 Times a Day. 180 Tab 1   • sumatriptan (IMITREX) 100 MG tablet Take 1 Tab by mouth Once PRN for Migraine for up to 1 dose.  10 Tab 3   • ibuprofen (MOTRIN) 800 MG Tab Take 1 Tab by mouth every 8 hours as needed for Headache. 10 Tab 3   • acetaminophen/caffeine/butalbital 325-40-50 mg (FIORICET) -40 MG Tab Take 1 Tab by mouth every four hours as needed for Headache. 30 Tab 0   • acetaminophen (TYLENOL) 325 MG Tab Take 2 Tabs by mouth every four hours as needed for Fever or Mild Pain. 30 Tab 0   • naproxen (NAPROSYN) 500 MG Tab Take 1 Tab by mouth 2 Times a Day. 60 Tab    • ondansetron (ZOFRAN ODT) 4 MG TABLET DISPERSIBLE Take 1 Tab by mouth every four hours as needed for Nausea/Vomiting (give PO if no IV route available). 10 Tab 0   • cetirizine (ZYRTEC) 10 MG Tab Take 1 Tab by mouth every day. 30 Tab    • atorvastatin (LIPITOR) 20 MG Tab Take 1 Tab by mouth every bedtime. 30 Tab    • diphenhydrAMINE (BENADRYL) 25 MG Tab Take 1 tablet by mouth at bedtime as needed.  May repeat 1 time. (insomnia). 30 Tab 0   • [DISCONTINUED] MethylPREDNISolone (MEDROL DOSEPAK) 4 MG Tablet Therapy Pack Take 1 Tab by mouth every day. (Patient not taking: Reported on 12/3/2018) 1 Kit 0   • [DISCONTINUED] potassium chloride SA (KDUR) 20 MEQ Tab CR Take 1 Tab by mouth every day. (Patient not taking: Reported on 12/3/2018) 60 Tab 11     No facility-administered encounter medications on file as of 12/3/2018.      Review of Systems   Constitutional: Negative for chills, fever and malaise/fatigue.   Eyes: Negative for blurred vision and discharge.   Respiratory: Negative for cough and shortness of breath.    Cardiovascular: Positive for chest pain. Negative for palpitations, leg swelling and PND.   Gastrointestinal: Negative for heartburn and nausea.   Genitourinary: Negative for dysuria and urgency.   Musculoskeletal: Positive for back pain. Negative for myalgias.   Skin: Negative for itching and rash.   Neurological: Positive for headaches. Negative for dizziness.   Endo/Heme/Allergies: Negative for environmental allergies. Does not bruise/bleed easily.  "  Psychiatric/Behavioral: Negative for depression. The patient is not nervous/anxious.         Objective:   /80 (BP Location: Left arm, Patient Position: Sitting, BP Cuff Size: Large adult)   Pulse 66   Ht 1.676 m (5' 6\")   Wt 83.9 kg (185 lb)   BMI 29.86 kg/m²     Physical Exam   Constitutional: She is oriented to person, place, and time. She appears well-developed and well-nourished.   Cardiovascular: Normal rate and regular rhythm.  Exam reveals no gallop and no friction rub.    No murmur heard.  No carotid bruits   Pulmonary/Chest: Effort normal and breath sounds normal.   Abdominal: Soft. There is no tenderness.   Musculoskeletal: She exhibits no edema or deformity.   Neurological: She is alert and oriented to person, place, and time.   Skin: Skin is warm and dry.       Assessment:     1. Other chest pain  EKG   2. Angina pectoris, variant (HCC)     3. Hyperlipidemia with target LDL less than 160     4. Other forms of angina pectoris (HCC)  NM-CARDIAC STRESS TEST       Medical Decision Making:  Today's Assessment / Status / Plan:   The syndrome is compatible with coronary artery spasm but certainly I am concerned also by the high LDL cholesterol and the possibility of fixed coronary stenosis.  She is already taking aspirin.  I have ordered sublingual nitroglycerin as a diagnostic agent for her syndrome  I have also ordered a chemical MPI with close follow-up thereafter.  Thank you for this consultation.  "

## 2018-12-28 ENCOUNTER — HOSPITAL ENCOUNTER (OUTPATIENT)
Dept: RADIOLOGY | Facility: MEDICAL CENTER | Age: 58
End: 2018-12-28
Attending: INTERNAL MEDICINE
Payer: COMMERCIAL

## 2018-12-28 DIAGNOSIS — I20.89 OTHER FORMS OF ANGINA PECTORIS: ICD-10-CM

## 2018-12-28 PROCEDURE — 78452 HT MUSCLE IMAGE SPECT MULT: CPT | Mod: 26 | Performed by: INTERNAL MEDICINE

## 2018-12-28 PROCEDURE — 93018 CV STRESS TEST I&R ONLY: CPT | Performed by: INTERNAL MEDICINE

## 2018-12-28 PROCEDURE — A9502 TC99M TETROFOSMIN: HCPCS

## 2018-12-28 PROCEDURE — 700111 HCHG RX REV CODE 636 W/ 250 OVERRIDE (IP)

## 2018-12-28 RX ORDER — REGADENOSON 0.08 MG/ML
INJECTION, SOLUTION INTRAVENOUS
Status: COMPLETED
Start: 2018-12-28 | End: 2018-12-28

## 2018-12-28 RX ADMIN — REGADENOSON 0.4 MG: 0.08 INJECTION, SOLUTION INTRAVENOUS at 16:18

## 2018-12-31 ENCOUNTER — APPOINTMENT (OUTPATIENT)
Dept: URGENT CARE | Facility: CLINIC | Age: 58
End: 2018-12-31
Payer: COMMERCIAL

## 2019-01-01 ENCOUNTER — OFFICE VISIT (OUTPATIENT)
Dept: URGENT CARE | Facility: CLINIC | Age: 59
End: 2019-01-01
Payer: COMMERCIAL

## 2019-01-01 VITALS
DIASTOLIC BLOOD PRESSURE: 85 MMHG | SYSTOLIC BLOOD PRESSURE: 130 MMHG | BODY MASS INDEX: 29.89 KG/M2 | WEIGHT: 186 LBS | OXYGEN SATURATION: 95 % | HEART RATE: 80 BPM | HEIGHT: 66 IN | RESPIRATION RATE: 18 BRPM | TEMPERATURE: 97.8 F

## 2019-01-01 DIAGNOSIS — J01.00 ACUTE MAXILLARY SINUSITIS, RECURRENCE NOT SPECIFIED: ICD-10-CM

## 2019-01-01 DIAGNOSIS — J22 LRTI (LOWER RESPIRATORY TRACT INFECTION): ICD-10-CM

## 2019-01-01 PROCEDURE — 99214 OFFICE O/P EST MOD 30 MIN: CPT | Performed by: PHYSICIAN ASSISTANT

## 2019-01-01 RX ORDER — DOXYCYCLINE HYCLATE 100 MG
100 TABLET ORAL 2 TIMES DAILY
Qty: 14 TAB | Refills: 0 | Status: SHIPPED | OUTPATIENT
Start: 2019-01-01 | End: 2019-01-08

## 2019-01-01 RX ORDER — CODEINE PHOSPHATE AND GUAIFENESIN 10; 100 MG/5ML; MG/5ML
5 SOLUTION ORAL EVERY 6 HOURS PRN
Qty: 100 ML | Refills: 0 | Status: SHIPPED | OUTPATIENT
Start: 2019-01-01 | End: 2019-01-06

## 2019-01-01 ASSESSMENT — ENCOUNTER SYMPTOMS
FEVER: 1
SINUS PAIN: 1
RHINORRHEA: 1
HEMOPTYSIS: 0
TINGLING: 0
SHORTNESS OF BREATH: 1
WHEEZING: 1
SORE THROAT: 1
PALPITATIONS: 0
FOCAL WEAKNESS: 0
SWEATS: 0
VOMITING: 0
MYALGIAS: 0
NAUSEA: 0
COUGH: 1
CHILLS: 0
SPUTUM PRODUCTION: 0
DIARRHEA: 0
ABDOMINAL PAIN: 0
HEADACHES: 1
SENSORY CHANGE: 0

## 2019-01-01 NOTE — PROGRESS NOTES
Subjective:      Sharlene Garcia is a 58 y.o. female who presents with Cough (Few days wet cough,chest congestion .)            Cough   This is a new problem. Episode onset: 4 days  The problem has been gradually worsening. The cough is non-productive. Associated symptoms include a fever (subjective fever 3 days ), headaches, nasal congestion, rhinorrhea, a sore throat, shortness of breath and wheezing. Pertinent negatives include no chest pain, chills, ear congestion, ear pain, hemoptysis, myalgias, postnasal drip, rash or sweats. She has tried OTC cough suppressant for the symptoms. The treatment provided no relief. Her past medical history is significant for asthma, bronchitis and pneumonia (last episode 20 years ago ).       Past Medical History:   Diagnosis Date   • Allergy    • Anxiety     Due to financial issues, never took medication.   • ASTHMA    • Chronic bronchitis     Including multiple episodes of pnemonia, until    • GERD (gastroesophageal reflux disease)     Doing better now with weight loss 6/10.   • Headache(784.0)    • Hyperlipidemia LDL goal < 160 10/28/2010   • Menopausal and perimenopausal disorder    • Migraine     Rare, many years ago.       Past Surgical History:   Procedure Laterality Date   • RECOVERY  10/25/2015    Procedure: [facility] RECOVERY ONLY;  Surgeon: Ir-Recovery Surgery;  Location: SURGERY [facility];  Service:    • CRISTINA BY LAPAROSCOPY  2011    Performed by LUIS ALBERTO ROBLES at SURGERY [facility] ORS   • HYSTEROSCOPY WITH VIDEO OPERATIVE  2010    Performed by PETER MORALES at SURGERY SAME DAY [facility] ORS   • PB  DELIVERY ONLY     • PRIMARY C SECTION     • MAMMOPLASTY AUGMENTATION      And removal calcium deposits   • [place] ORAL SURGERY PROCEDURE      wisdom teeth extraction   • BUNIONECTOMY  1978/1984    x2   • PB ENLARGE BREAST WITH IMPLANT         Family History   Problem Relation Age of Onset   • Cancer Mother 53        Ovarian  " at 57 from it   • Diabetes Father        Allergies   Allergen Reactions   • Nkda [No Known Drug Allergy]    • Other Environmental        Medications, Allergies, and current problem list reviewed today in Epic      Review of Systems   Constitutional: Positive for fever (subjective fever 3 days ) and malaise/fatigue. Negative for chills.   HENT: Positive for congestion, rhinorrhea, sinus pain and sore throat. Negative for ear discharge, ear pain and postnasal drip.    Respiratory: Positive for cough, shortness of breath and wheezing. Negative for hemoptysis and sputum production.    Cardiovascular: Negative for chest pain, palpitations and leg swelling.   Gastrointestinal: Negative for abdominal pain, diarrhea, nausea and vomiting.   Musculoskeletal: Negative for myalgias.   Skin: Negative for rash.   Neurological: Positive for headaches. Negative for tingling, sensory change and focal weakness.       All other systems reviewed and are negative.        Objective:     /85 (BP Location: Right arm, Patient Position: Sitting, BP Cuff Size: Adult)   Pulse 80   Temp 36.6 °C (97.8 °F) (Temporal)   Resp 18   Ht 1.676 m (5' 6\")   Wt 84.4 kg (186 lb)   SpO2 95%   BMI 30.02 kg/m²      Physical Exam   Constitutional: She is oriented to person, place, and time. She appears well-developed and well-nourished. No distress.   HENT:   Head: Normocephalic and atraumatic.   Right Ear: Tympanic membrane, external ear and ear canal normal.   Left Ear: Tympanic membrane, external ear and ear canal normal.   Nose: Mucosal edema and rhinorrhea present. Right sinus exhibits maxillary sinus tenderness. Left sinus exhibits maxillary sinus tenderness.   Mouth/Throat: Uvula is midline, oropharynx is clear and moist and mucous membranes are normal.   Neck: Neck supple.   Cardiovascular: Normal rate, regular rhythm and normal heart sounds.  Exam reveals no gallop and no friction rub.    No murmur heard.  Pulmonary/Chest: Effort " normal and breath sounds normal. No respiratory distress. She has no wheezes. She has no rales.   Deep, spasmodic cough    Lymphadenopathy:     She has no cervical adenopathy.   Neurological: She is alert and oriented to person, place, and time. No cranial nerve deficit.   Psychiatric: She has a normal mood and affect. Her behavior is normal. Judgment and thought content normal.               Assessment/Plan:     1. LRTI (lower respiratory tract infection)  doxycycline (VIBRAMYCIN) 100 MG Tab    guaifenesin-codeine (ROBITUSSIN AC) Solution oral solution   2. Acute maxillary sinusitis, recurrence not specified  doxycycline (VIBRAMYCIN) 100 MG Tab         Current Outpatient Prescriptions:   •  doxycycline (VIBRAMYCIN) 100 MG Tab, Take 1 Tab by mouth 2 times a day for 7 days., Disp: 14 Tab, Rfl: 0    •  guaifenesin-codeine (ROBITUSSIN AC) Solution oral solution, Take 5 mL by mouth every 6 hours as needed for Cough for up to 5 days., Disp: 100 mL, Rfl: 0  Sedation side effects discussed. No Driving or alcohol with medication given.    [facility] Aware web site evaluation: I have obtained and reviewed patient utilization report from [facility] database prior to writing prescription for controlled substance II, III or IV per [place] bill . Based on the report and my clinical assessment the prescription is medically necessary.         Differential diagnoses, Supportive care, and indications for immediate follow-up discussed with patient.   Instructed to return to clinic or nearest emergency department for any change in condition, further concerns, or worsening of symptoms.    The patient demonstrated a good understanding and agreed with the treatment plan.      Zaira Pineda P.A.-C.

## 2019-01-09 ENCOUNTER — OFFICE VISIT (OUTPATIENT)
Dept: CARDIOLOGY | Facility: MEDICAL CENTER | Age: 59
End: 2019-01-09
Payer: COMMERCIAL

## 2019-01-09 VITALS
HEIGHT: 66 IN | SYSTOLIC BLOOD PRESSURE: 132 MMHG | BODY MASS INDEX: 30.86 KG/M2 | DIASTOLIC BLOOD PRESSURE: 80 MMHG | WEIGHT: 192 LBS | HEART RATE: 70 BPM | OXYGEN SATURATION: 96 %

## 2019-01-09 DIAGNOSIS — E03.9 ACQUIRED HYPOTHYROIDISM: ICD-10-CM

## 2019-01-09 DIAGNOSIS — R07.89 OTHER CHEST PAIN: ICD-10-CM

## 2019-01-09 DIAGNOSIS — E78.5 HYPERLIPIDEMIA WITH TARGET LDL LESS THAN 160: ICD-10-CM

## 2019-01-09 PROCEDURE — 99213 OFFICE O/P EST LOW 20 MIN: CPT | Performed by: NURSE PRACTITIONER

## 2019-01-09 ASSESSMENT — ENCOUNTER SYMPTOMS
ABDOMINAL PAIN: 0
PALPITATIONS: 0
PND: 0
CLAUDICATION: 0
ORTHOPNEA: 0
WEAKNESS: 0
DIZZINESS: 0
SHORTNESS OF BREATH: 0
HEADACHES: 1
MYALGIAS: 0
COUGH: 0

## 2019-01-09 NOTE — LETTER
[facility]   [address], [facility] [address]  [place] [address]  Phone: [phone]  Fax: [phone]              Sharlene Garcia  1960    Encounter Date: 1/9/2019    BERT Singer          PROGRESS NOTE:  Chief Complaint   Patient presents with   • Chest Pain       Subjective:   Sharlene Garcia is a 58 y.o. female who presents today for follow-up for jaw pain with radiation to her chest.  Patient was seen by Dr. Becerril 12/3/18 for initial consultation at the request of Dr. Bueno for angina pectoris.  Recent lipid panel showed very high LDL of 188 and mildly abnormal TSH.  She was started on statin therapy and thyroid supplement at that time.  A nuclear stress test and sublingual nitroglycerin was prescribed as a diagnostic agent for her symptoms.     The chemical stress test was completed 12/28/18 which showed no signs of ischemia.     Today patient states she is feeling well.  She denies any further symptoms of chest pain radiating into her jaw.  Her discomfort was usually at rest and she denies any exertional symptoms.  She also denies palpitations, shortness of breath, or lower extremity edema.  She has not used any nitroglycerin as she has not needed it and has not had chest pain since last visit.    Past Medical History:   Diagnosis Date   • Acquired hypothyroidism 1/9/2019   • Allergy    • Anxiety     Due to financial issues, never took medication.   • ASTHMA    • Chronic bronchitis     Including multiple episodes of pnemonia, until 2001   • GERD (gastroesophageal reflux disease) 2005    Doing better now with weight loss 6/10.   • Headache(784.0)    • Hyperlipidemia LDL goal < 160 10/28/2010   • Menopausal and perimenopausal disorder 2005   • Migraine     Rare, many years ago.     Past Surgical History:   Procedure Laterality Date   • RECOVERY  10/25/2015    Procedure: [facility] RECOVERY ONLY;  Surgeon: [facility];  Location: SURGERY [facility]  ORS;  Service:    • CRISTINA BY LAPAROSCOPY  2011    Performed by LUIS ALBERTO ROBLES at SURGERY [facility] ORS   • HYSTEROSCOPY WITH VIDEO OPERATIVE  2010    Performed by PETER MORALES at SURGERY SAME DAY [facility] ORS   • PB  DELIVERY ONLY     • PRIMARY C SECTION     • MAMMOPLASTY AUGMENTATION      And removal calcium deposits   • [place] ORAL SURGERY PROCEDURE      wisdom teeth extraction   • BUNIONECTOMY  1978/1984    x2   • PB ENLARGE BREAST WITH IMPLANT       Family History   Problem Relation Age of Onset   • Cancer Mother 53        Ovarian  at 57 from it   • Diabetes Father      Social History     Social History   • Marital status:      Spouse name: N/A   • Number of children: N/A   • Years of education: N/A     Occupational History   • Not on file.     Social History Main Topics   • Smoking status: Never Smoker   • Smokeless tobacco: Never Used   • Alcohol use 0.0 - 0.5 oz/week      Comment: once a week   • Drug use: No   • Sexual activity: Yes     Partners: Male     Birth control/ protection: Post-Menopausal      Comment: menopausal since      Other Topics Concern   • Not on file     Social History Narrative   • No narrative on file     Allergies   Allergen Reactions   • Nkda [No Known Drug Allergy]    • Other Environmental      Outpatient Encounter Prescriptions as of 2019   Medication Sig Dispense Refill   • levothyroxine (SYNTHROID) 50 MCG Tab Take 50 mcg by mouth Every morning on an empty stomach.     • divalproex ER (DEPAKOTE ER) 250 MG TABLET SR 24 HR Take 1 Tab by mouth 2 Times a Day. 60 Tab 6   • topiramate (TOPAMAX) 100 MG Tab Take 1 Tab by mouth 2 Times a Day. 180 Tab 1   • atorvastatin (LIPITOR) 20 MG Tab Take 1 Tab by mouth every bedtime. 30 Tab    • nitroglycerin (NITROSTAT) 0.4 MG SL Tab Place 1 Tab under tongue as needed for Chest Pain. (Patient not taking: Reported on 2019) 25 Tab 11   • sumatriptan (IMITREX) 100 MG tablet Take 1 Tab by  "mouth Once PRN for Migraine for up to 1 dose. (Patient not taking: Reported on 1/9/2019) 10 Tab 3   • ibuprofen (MOTRIN) 800 MG Tab Take 1 Tab by mouth every 8 hours as needed for Headache. (Patient not taking: Reported on 1/9/2019) 10 Tab 3   • acetaminophen (TYLENOL) 325 MG Tab Take 2 Tabs by mouth every four hours as needed for Fever or Mild Pain. (Patient not taking: Reported on 1/9/2019) 30 Tab 0   • naproxen (NAPROSYN) 500 MG Tab Take 1 Tab by mouth 2 Times a Day. (Patient not taking: Reported on 1/9/2019) 60 Tab    • ondansetron (ZOFRAN ODT) 4 MG TABLET DISPERSIBLE Take 1 Tab by mouth every four hours as needed for Nausea/Vomiting (give PO if no IV route available). (Patient not taking: Reported on 1/9/2019) 10 Tab 0   • cetirizine (ZYRTEC) 10 MG Tab Take 1 Tab by mouth every day. (Patient not taking: Reported on 1/9/2019) 30 Tab    • diphenhydrAMINE (BENADRYL) 25 MG Tab Take 1 tablet by mouth at bedtime as needed.  May repeat 1 time. (insomnia). (Patient not taking: Reported on 1/9/2019) 30 Tab 0   • [DISCONTINUED] acetaminophen/caffeine/butalbital 325-40-50 mg (FIORICET) -40 MG Tab Take 1 Tab by mouth every four hours as needed for Headache. (Patient not taking: Reported on 1/9/2019) 30 Tab 0     No facility-administered encounter medications on file as of 1/9/2019.      Review of Systems   Constitutional: Negative for malaise/fatigue.   Respiratory: Negative for cough and shortness of breath.    Cardiovascular: Positive for chest pain (gone since december.). Negative for palpitations, orthopnea, claudication, leg swelling and PND.   Gastrointestinal: Negative for abdominal pain.   Musculoskeletal: Negative for myalgias.   Neurological: Positive for headaches (has migraine.). Negative for dizziness and weakness.        Objective:   /80 (BP Location: Left arm, Patient Position: Sitting, BP Cuff Size: Adult)   Pulse 70   Ht 1.676 m (5' 6\")   Wt 87.1 kg (192 lb)   SpO2 96%   BMI 30.99 kg/m²    "     Physical Exam   Constitutional: She is oriented to person, place, and time. She appears well-developed and well-nourished. No distress.   HENT:   Head: Normocephalic.   Eyes: Pupils are equal, round, and reactive to light. EOM are normal.   Neck: No JVD present.   Cardiovascular: Normal rate, regular rhythm and normal heart sounds.    No murmur heard.  Pulmonary/Chest: Effort normal and breath sounds normal. No respiratory distress. She has no wheezes. She has no rales. She exhibits no tenderness.   Abdominal: Soft. Bowel sounds are normal. There is no tenderness.   Musculoskeletal: She exhibits no edema.   Neurological: She is alert and oriented to person, place, and time.   Skin: Skin is warm and dry. She is not diaphoretic. No erythema.   Psychiatric: She has a normal mood and affect. Her behavior is normal. Judgment and thought content normal.   Nursing note and vitals reviewed.    ECHO 09/20/2017:  Normal left ventricular systolic function.   No evidence of valvular abnormality based on Doppler evaluation.   No prior study is available for comparison.   Normal left ventricular systolic function. Left ventricular ejection fraction is visually estimated to be 65%.      12/28/18 Cardiac Stress Test:   NUCLEAR IMAGING INTERPRETATION   Normal myocardial perfusion with no ischemia.   Normal left ventricular wall motion.  LV ejection fraction = 69%.   ECG INTERPRETATION   Negative stress ECG for ischemia.    Assessment:     1. Other chest pain     2. Hyperlipidemia with target LDL less than 160     3. Acquired hypothyroidism         Medical Decision Making:  Today's Assessment / Status / Plan:   Chest pain: Jaw pain with radiation to the chest has not reoccurred since December.  EKG showed NSR. MPI stress test (12/28/18) was negative for ischemia.  Chest pain likely not cardiac.  There is a possibility that she is having coronary spasm those causing her chest and jaw discomfort.  If she does develop her symptoms  again I would like her to use nitroglycerin to see if she gets relief within 5 minutes.    Hyperlipidemia: .  Possibly her elevated lipids are related to hypothyroidism.  Continue Lipitor 20 mg daily.  Recommend repeat lipid panel in 2 months.  She will follow with primary care for her lipids.    Hypothyroidism: Continue Synthroid 50 mcg daily.  Follow with primary care for thyroid monitoring.    Patient does not have a cardiac problem that needs to be followed in our office.  She will follow with her primary care for her hyperlipidemia and hypothyroidism.  However, should she need our services she is always welcome to return.     Collaborating Provider: Dr. Ivette Ross      No Recipients

## 2019-01-09 NOTE — PROGRESS NOTES
Chief Complaint   Patient presents with   • Chest Pain       Subjective:   Sharlene Garcia is a 58 y.o. female who presents today for follow-up for jaw pain with radiation to her chest.  Patient was seen by Dr. Becerril 12/3/18 for initial consultation at the request of Dr. Bueno for angina pectoris.  Recent lipid panel showed very high LDL of 188 and mildly abnormal TSH.  She was started on statin therapy and thyroid supplement at that time.  A nuclear stress test and sublingual nitroglycerin was prescribed as a diagnostic agent for her symptoms.     The chemical stress test was completed 18 which showed no signs of ischemia.     Today patient states she is feeling well.  She denies any further symptoms of chest pain radiating into her jaw.  Her discomfort was usually at rest and she denies any exertional symptoms.  She also denies palpitations, shortness of breath, or lower extremity edema.  She has not used any nitroglycerin as she has not needed it and has not had chest pain since last visit.    Past Medical History:   Diagnosis Date   • Acquired hypothyroidism 2019   • Allergy    • Anxiety     Due to financial issues, never took medication.   • ASTHMA    • Chronic bronchitis     Including multiple episodes of pnemonia, until    • GERD (gastroesophageal reflux disease)     Doing better now with weight loss 6/10.   • Headache(784.0)    • Hyperlipidemia LDL goal < 160 10/28/2010   • Menopausal and perimenopausal disorder    • Migraine     Rare, many years ago.     Past Surgical History:   Procedure Laterality Date   • RECOVERY  10/25/2015    Procedure: [facility] RECOVERY ONLY;  Surgeon: [facility];  Location: SURGERY [facility] ORS;  Service:    • CRISTINA BY LAPAROSCOPY  2011    Performed by LUIS ALBERTO ROBLES at SURGERY [facility] ORS   • HYSTEROSCOPY WITH VIDEO OPERATIVE  2010    Performed by PETER MORALES at SURGERY SAME DAY [facility] ORS   • PB  DELIVERY ONLY      • PRIMARY C SECTION     • MAMMOPLASTY AUGMENTATION      And removal calcium deposits   • [place] ORAL SURGERY PROCEDURE      wisdom teeth extraction   • BUNIONECTOMY  1978/1984    x2   • PB ENLARGE BREAST WITH IMPLANT       Family History   Problem Relation Age of Onset   • Cancer Mother 53        Ovarian  at 57 from it   • Diabetes Father      Social History     Social History   • Marital status:      Spouse name: N/A   • Number of children: N/A   • Years of education: N/A     Occupational History   • Not on file.     Social History Main Topics   • Smoking status: Never Smoker   • Smokeless tobacco: Never Used   • Alcohol use 0.0 - 0.5 oz/week      Comment: once a week   • Drug use: No   • Sexual activity: Yes     Partners: Male     Birth control/ protection: Post-Menopausal      Comment: menopausal since      Other Topics Concern   • Not on file     Social History Narrative   • No narrative on file     Allergies   Allergen Reactions   • Nkda [No Known Drug Allergy]    • Other Environmental      Outpatient Encounter Prescriptions as of 2019   Medication Sig Dispense Refill   • levothyroxine (SYNTHROID) 50 MCG Tab Take 50 mcg by mouth Every morning on an empty stomach.     • divalproex ER (DEPAKOTE ER) 250 MG TABLET SR 24 HR Take 1 Tab by mouth 2 Times a Day. 60 Tab 6   • topiramate (TOPAMAX) 100 MG Tab Take 1 Tab by mouth 2 Times a Day. 180 Tab 1   • atorvastatin (LIPITOR) 20 MG Tab Take 1 Tab by mouth every bedtime. 30 Tab    • nitroglycerin (NITROSTAT) 0.4 MG SL Tab Place 1 Tab under tongue as needed for Chest Pain. (Patient not taking: Reported on 2019) 25 Tab 11   • sumatriptan (IMITREX) 100 MG tablet Take 1 Tab by mouth Once PRN for Migraine for up to 1 dose. (Patient not taking: Reported on 2019) 10 Tab 3   • ibuprofen (MOTRIN) 800 MG Tab Take 1 Tab by mouth every 8 hours as needed for Headache. (Patient not taking: Reported on 2019) 10 Tab 3   • acetaminophen  "(TYLENOL) 325 MG Tab Take 2 Tabs by mouth every four hours as needed for Fever or Mild Pain. (Patient not taking: Reported on 1/9/2019) 30 Tab 0   • naproxen (NAPROSYN) 500 MG Tab Take 1 Tab by mouth 2 Times a Day. (Patient not taking: Reported on 1/9/2019) 60 Tab    • ondansetron (ZOFRAN ODT) 4 MG TABLET DISPERSIBLE Take 1 Tab by mouth every four hours as needed for Nausea/Vomiting (give PO if no IV route available). (Patient not taking: Reported on 1/9/2019) 10 Tab 0   • cetirizine (ZYRTEC) 10 MG Tab Take 1 Tab by mouth every day. (Patient not taking: Reported on 1/9/2019) 30 Tab    • diphenhydrAMINE (BENADRYL) 25 MG Tab Take 1 tablet by mouth at bedtime as needed.  May repeat 1 time. (insomnia). (Patient not taking: Reported on 1/9/2019) 30 Tab 0   • [DISCONTINUED] acetaminophen/caffeine/butalbital 325-40-50 mg (FIORICET) -40 MG Tab Take 1 Tab by mouth every four hours as needed for Headache. (Patient not taking: Reported on 1/9/2019) 30 Tab 0     No facility-administered encounter medications on file as of 1/9/2019.      Review of Systems   Constitutional: Negative for malaise/fatigue.   Respiratory: Negative for cough and shortness of breath.    Cardiovascular: Positive for chest pain (gone since december.). Negative for palpitations, orthopnea, claudication, leg swelling and PND.   Gastrointestinal: Negative for abdominal pain.   Musculoskeletal: Negative for myalgias.   Neurological: Positive for headaches (has migraine.). Negative for dizziness and weakness.        Objective:   /80 (BP Location: Left arm, Patient Position: Sitting, BP Cuff Size: Adult)   Pulse 70   Ht 1.676 m (5' 6\")   Wt 87.1 kg (192 lb)   SpO2 96%   BMI 30.99 kg/m²     Physical Exam   Constitutional: She is oriented to person, place, and time. She appears well-developed and well-nourished. No distress.   HENT:   Head: Normocephalic.   Eyes: Pupils are equal, round, and reactive to light. EOM are normal.   Neck: No JVD " present.   Cardiovascular: Normal rate, regular rhythm and normal heart sounds.    No murmur heard.  Pulmonary/Chest: Effort normal and breath sounds normal. No respiratory distress. She has no wheezes. She has no rales. She exhibits no tenderness.   Abdominal: Soft. Bowel sounds are normal. There is no tenderness.   Musculoskeletal: She exhibits no edema.   Neurological: She is alert and oriented to person, place, and time.   Skin: Skin is warm and dry. She is not diaphoretic. No erythema.   Psychiatric: She has a normal mood and affect. Her behavior is normal. Judgment and thought content normal.   Nursing note and vitals reviewed.    ECHO 09/20/2017:  Normal left ventricular systolic function.   No evidence of valvular abnormality based on Doppler evaluation.   No prior study is available for comparison.   Normal left ventricular systolic function. Left ventricular ejection fraction is visually estimated to be 65%.      12/28/18 Cardiac Stress Test:   NUCLEAR IMAGING INTERPRETATION   Normal myocardial perfusion with no ischemia.   Normal left ventricular wall motion.  LV ejection fraction = 69%.   ECG INTERPRETATION   Negative stress ECG for ischemia.    Assessment:     1. Other chest pain     2. Hyperlipidemia with target LDL less than 160     3. Acquired hypothyroidism         Medical Decision Making:  Today's Assessment / Status / Plan:   Chest pain: Jaw pain with radiation to the chest has not reoccurred since December.  EKG showed NSR. MPI stress test (12/28/18) was negative for ischemia.  Chest pain likely not cardiac.  There is a possibility that she is having coronary spasm those causing her chest and jaw discomfort.  If she does develop her symptoms again I would like her to use nitroglycerin to see if she gets relief within 5 minutes.    Hyperlipidemia: .  Possibly her elevated lipids are related to hypothyroidism.  Continue Lipitor 20 mg daily.  Recommend repeat lipid panel in 2 months.  She  will follow with primary care for her lipids.    Hypothyroidism: Continue Synthroid 50 mcg daily.  Follow with primary care for thyroid monitoring.    Patient does not have a cardiac problem that needs to be followed in our office.  She will follow with her primary care for her hyperlipidemia and hypothyroidism.  However, should she need our services she is always welcome to return.     Collaborating Provider: Dr. Ivette Ross

## 2019-01-25 ENCOUNTER — OFFICE VISIT (OUTPATIENT)
Dept: NEUROLOGY | Facility: MEDICAL CENTER | Age: 59
End: 2019-01-25
Payer: COMMERCIAL

## 2019-01-25 VITALS
HEIGHT: 66 IN | HEART RATE: 69 BPM | TEMPERATURE: 97.9 F | BODY MASS INDEX: 30.89 KG/M2 | WEIGHT: 192.2 LBS | SYSTOLIC BLOOD PRESSURE: 122 MMHG | DIASTOLIC BLOOD PRESSURE: 80 MMHG | OXYGEN SATURATION: 97 % | RESPIRATION RATE: 16 BRPM

## 2019-01-25 DIAGNOSIS — E53.8 B12 DEFICIENCY: ICD-10-CM

## 2019-01-25 DIAGNOSIS — E03.9 ACQUIRED HYPOTHYROIDISM: ICD-10-CM

## 2019-01-25 PROCEDURE — 99214 OFFICE O/P EST MOD 30 MIN: CPT | Performed by: PSYCHIATRY & NEUROLOGY

## 2019-01-25 ASSESSMENT — PATIENT HEALTH QUESTIONNAIRE - PHQ9: CLINICAL INTERPRETATION OF PHQ2 SCORE: 0

## 2019-01-25 NOTE — PATIENT INSTRUCTIONS
IMPRESSION:    1. Migraine under Topamax, without Topamax, headache became worse  2. Aneurysm status post coiling -- monitored by Dr Hazel every year ( MRI compatible)- the last MRA of brain was done Sep 2018  3. Claustrophobia ( needs Xanax 2mg before procedure)  4. Chiari I malformation. Stable finding ( coughing did make her headache worse)  5. Abnormal EEG      A. Advise cut down on pain medicine-- including over the counter pain pills-- the less rescue medicine, the less likely to develop rebound headache  The goal is to limit all rescue medicine to less than 2# per week. The first 2 weeks of medicine withdrawal would be tough      B. Advise exercise regularly, avoid skipping meals, avoid sleep deprivation, avoid stress...avoid too much coffee/tea/soda ( one cup per day is the upper limit)    C. Try preventive medicine, it would take two weeks( at least) to evaluate the effects of any preventive medicine-- please call us if any side effects, we could change to another preventive medicine on the phone    Currently took Topamax and depakote    D. ONB- occipital nerve block and or botox injection may be an option in the future too-- it would take a couple of trials before we can conclude that these injections does not help either      The patient's intractable headache failed high dose of topamax, depakote or nerve block,  It is reasonable to apply for botox for her headache prevention--- the patient usually went to go to urgent care for intractable headache      We will continue     Topamax 100mg two times per day  Depakote to 250mg two times per day  Consider Botox-- the patient's intractable headache did not fully respond to Topamax, Depakote and Occipital nerve block        CC:  Alberto Gandara M.D.

## 2019-01-25 NOTE — PROGRESS NOTES
NEUROLOGY NOTE    Referring Physician  Alberto Gandara M.D.      CHIEF COMPLAINT:  Headache responded with Topamax  Chief Complaint   Patient presents with   • Follow-Up     Cerebral aneurysm with out rupture       PRESENT ILLNESS:     Tried on ONB but did not notice major benefits      This 58-year-old woman who has had a history of migraines    1. Migraine under Topamax, without Topamax, headache became worse  2. Aneurysm status post coiling -- monitored by Dr Hazel every year ( MRI compatible)  3. Claustrophobia ( needs Xanax 2mg before procedure)  4. Chiari I malformation. Stable finding ( coughing did make her headache worse)  5. Tinnitus, Hearing loss    Headache intractable for years  1. Location-- vertex, all over temporal-- changing  Location  2. Characteristics- throbbing  3. Associated--light   4. Worsening--   5. Relieving factors--   Rescue medicine   Tried topamax and depakote but headache remains    6. Family History-  7. Every day- all day long  8. Severity-- can not work  9. Sleep-- no problem  10. Coffee intake-- not coffee addict          PAST MEDICAL HISTORY:  Past Medical History:   Diagnosis Date   • Acquired hypothyroidism 1/9/2019   • Allergy    • Anxiety     Due to financial issues, never took medication.   • ASTHMA    • Chronic bronchitis     Including multiple episodes of pnemonia, until 2001   • GERD (gastroesophageal reflux disease) 2005    Doing better now with weight loss 6/10.   • Headache(784.0)    • Hyperlipidemia LDL goal < 160 10/28/2010   • Menopausal and perimenopausal disorder 2005   • Migraine     Rare, many years ago.       PAST SURGICAL HISTORY:  Past Surgical History:   Procedure Laterality Date   • RECOVERY  10/25/2015    Procedure: [facility] RECOVERY ONLY;  Surgeon: [facility];  Location: [facility];  Service:    • CRISTINA BY LAPAROSCOPY  11/16/2011    Performed by LUIS ALBERTO ROBLES at [facility]   • HYSTEROSCOPY WITH VIDEO OPERATIVE   2010    Performed by PETER MORALES at SURGERY SAME DAY [facility]   • PB  DELIVERY ONLY     • PRIMARY C SECTION     • MAMMOPLASTY AUGMENTATION      And removal calcium deposits   • MA ORAL SURGERY PROCEDURE      wisdom teeth extraction   • BUNIONECTOMY  1978/1984    x2   • PB ENLARGE BREAST WITH IMPLANT         FAMILY HISTORY:  Family History   Problem Relation Age of Onset   • Cancer Mother 53        Ovarian  at 57 from it   • Diabetes Father        SOCIAL HISTORY:  Social History     Social History   • Marital status:      Spouse name: N/A   • Number of children: N/A   • Years of education: N/A     Occupational History   • Not on file.     Social History Main Topics   • Smoking status: Never Smoker   • Smokeless tobacco: Never Used   • Alcohol use 0.0 - 0.5 oz/week      Comment: once a week   • Drug use: No   • Sexual activity: Yes     Partners: Male     Birth control/ protection: Post-Menopausal      Comment: menopausal since      Other Topics Concern   • Not on file     Social History Narrative   • No narrative on file     ALLERGIES:  Allergies   Allergen Reactions   • Nkda [No Known Drug Allergy]    • Other Environmental      TOBHX  History   Smoking Status   • Never Smoker   Smokeless Tobacco   • Never Used     ALCHX  History   Alcohol Use   • 0.0 - 0.5 oz/week     Comment: once a week     DRUGHX  History   Drug Use No           MEDICATIONS:  Current Outpatient Prescriptions   Medication   • levothyroxine (SYNTHROID) 50 MCG Tab   • divalproex ER (DEPAKOTE ER) 250 MG TABLET SR 24 HR   • topiramate (TOPAMAX) 100 MG Tab   • cetirizine (ZYRTEC) 10 MG Tab   • atorvastatin (LIPITOR) 20 MG Tab   • diphenhydrAMINE (BENADRYL) 25 MG Tab   • nitroglycerin (NITROSTAT) 0.4 MG SL Tab   • sumatriptan (IMITREX) 100 MG tablet   • ibuprofen (MOTRIN) 800 MG Tab   • acetaminophen (TYLENOL) 325 MG Tab   • naproxen (NAPROSYN) 500 MG Tab   • ondansetron (ZOFRAN ODT) 4 MG TABLET  "DISPERSIBLE     No current facility-administered medications for this visit.        REVIEW OF SYSTEM:    Constitutional: Denies fevers, Denies weight changes   Eyes: Denies changes in vision, no eye pain   Ears/Nose/Throat/Mouth: Denies nasal congestion or sore throat   Cardiovascular: Denies chest pain or palpitations   Respiratory: Denies SOB.   Gastrointestinal/Hepatic: Denies abdominal pain, nausea, vomiting, diarrhea, constipation or GI bleeding   Genitourinary: Denies bladder dysfunction, dysuria or frequency   Musculoskeletal/Rheum: Denies joint pain and swelling   Skin/Breast: Denies rash, denies breast lumps or discharge   Neurological: aneurysm status post coil  Psychiatric: denies mood disorder   Endocrine: denies hx of diabetes or thyroid dysfunction   Heme/Oncology/Lymph Nodes: Denies enlarged lymph nodes, denies brusing or known bleeding disorder   Allergic/Immunologic: Denies hx of allergies         PHYSICAL AND NEUROLOGICAL EXMAINATIONS:  VITAL SIGNS: /80 (BP Location: Right arm, Patient Position: Sitting, BP Cuff Size: Adult)   Pulse 69   Temp 36.6 °C (97.9 °F) (Temporal)   Resp 16   Ht 1.676 m (5' 6\")   Wt 87.2 kg (192 lb 3.2 oz)   SpO2 97%   BMI 31.02 kg/m²   CURRENT WEIGHT: [unfilled]  BMI: Body mass index is 31.02 kg/m².  PREVIOUS WEIGHTS:  Wt Readings from Last 25 Encounters:   01/25/19 87.2 kg (192 lb 3.2 oz)   01/09/19 87.1 kg (192 lb)   01/01/19 84.4 kg (186 lb)   12/03/18 83.9 kg (185 lb)   11/30/18 87.3 kg (192 lb 7.4 oz)   09/07/18 85.6 kg (188 lb 12.8 oz)   06/03/18 95.3 kg (210 lb)   04/26/18 93.4 kg (206 lb)   04/12/18 89.4 kg (197 lb)   01/18/18 87.4 kg (192 lb 9.6 oz)   09/19/17 89.5 kg (197 lb 5 oz)   09/19/17 89.3 kg (196 lb 13.9 oz)   03/29/17 81.6 kg (180 lb)   12/01/16 89.7 kg (197 lb 12.8 oz)   08/31/16 88.6 kg (195 lb 6.4 oz)   06/29/16 88.5 kg (195 lb 3.2 oz)   05/11/16 78 kg (172 lb)   04/22/16 78 kg (172 lb)   10/26/15 81.2 kg (179 lb 0.2 oz)   10/24/15 78 kg (172 " lb)   11/16/11 77.1 kg (170 lb)   10/10/11 78.5 kg (173 lb)   11/29/10 76.2 kg (168 lb)   10/05/10 75.8 kg (167 lb 3.2 oz)   03/26/10 83.5 kg (184 lb)       General appearance of patient: WDWN(+) NAD(+)    EYES  o Fundus : Papilledem(-) Exudates(-) Hemorrhage(-)  Nervous System  Orientation to time, place and person(+)  Memory normal(+)  Language: aphasia(-)  Knowledge: past(+) Current(+)  Attention(+)  Cranial Nerves  • Nerve 2: intact  • Nerve 3,4,6: intact  • Nerve 5 : intact  • Nerve 7: intact  • Nerve 8: intact  • Nerve 9 & 10: intact  • Nerve 11: intact  • Nerve 12: intact  Muscle Power and muscle tone: symmetric, normal in upper and lower  Sensory System: Pin sensation intact(+)  Reflexes: symmetric throughout  Cerebellar Function FNP normal   Gait : Steady(+) TandemGait steady(+)  Heart and Vascular  Peripheral Vasucular system : Edema (-) Swelling(-)  RHB, Breathing sound clear  abdomen bowel sound normoactive  Extremities freely moveable  Joints no contracture       NEUROIMAGING: I reviewed the MRI/CT of brain   First of all, I do not favor xanax, valium for tinnitus  Advise dietary restrictions      ________________________________________________________________________      ________________________________________________________________________        IMPRESSION:    1. Migraine under Topamax, without Topamax, headache became worse  2. Aneurysm status post coiling -- monitored by Dr Hazel every year ( MRI compatible)- the last MRA of brain was done Sep 2018  3. Claustrophobia ( needs Xanax 2mg before procedure)  4. Chiari I malformation. Stable finding ( coughing did make her headache worse)  5. Abnormal EEG      A. Advise cut down on pain medicine-- including over the counter pain pills-- the less rescue medicine, the less likely to develop rebound headache  The goal is to limit all rescue medicine to less than 2# per week. The first 2 weeks of medicine withdrawal would be tough      B. Advise  exercise regularly, avoid skipping meals, avoid sleep deprivation, avoid stress...avoid too much coffee/tea/soda ( one cup per day is the upper limit)    C. Try preventive medicine, it would take two weeks( at least) to evaluate the effects of any preventive medicine-- please call us if any side effects, we could change to another preventive medicine on the phone    Currently took Topamax and depakote    D. ONB- occipital nerve block and or botox injection may be an option in the future too-- it would take a couple of trials before we can conclude that these injections does not help either      The patient's intractable headache failed high dose of topamax, depakote or nerve block,  It is reasonable to apply for botox for her headache prevention--- the patient usually went to go to urgent care for intractable headache      We will continue     Topamax 100mg two times per day  Depakote to 250mg two times per day  Consider Botox-- the patient's intractable headache did not fully respond to Topamax, Depakote and Occipital nerve block        CC:  Alberto Gandara M.D.      9/2017  1.  Minimal supratentorial white matter disease most consistent with microvascular ischemic change versus demyelination or gliosis. Common findings for the patient's age.  2.  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 No evidence of intercurrent hemorrhage. No significant change.  3.  Chiari I malformation. Stable finding.  4.  Status post coiling supraclinoid left carotid terminus aneurysm. Expected susceptibility artifact from the coil pack. No definite recurrent aneurysm, however, MRA would be a better way to evaluate.            ________________________________________________________________________  REFERENCE ARNOLD-CHIARI TYPE I  ROUTINE ELECTROENCEPHALOGRAM REPORT      NAME: Sharlene Garcia      INTERPRETATION:        ________________________________________________________________________    This is abnormal routine EEG  recording in the awake and drowsy/sleep state(s).    This scalp EEG denotes         1. Mild focal cortical dysfunction over left --this patten would increase the risk of seizure - advise clinical correlation regarding management       ________________________________________________________________________

## 2019-03-07 ENCOUNTER — TELEPHONE (OUTPATIENT)
Dept: NEUROLOGY | Facility: MEDICAL CENTER | Age: 59
End: 2019-03-07

## 2019-03-07 NOTE — TELEPHONE ENCOUNTER
Pt called and stated she would like to have [facility] paperwork filled out. She was see in January, she will fax paperwork to the office, it will be reviewed by myself and Debbi before scheduling with Yennifer for paperwork.

## 2019-03-20 NOTE — PROGRESS NOTES
Patient  was seen in the Urgent care on the 10th of March and wanted a refill of the benzonatate. She would like a call back to see if it could be picked up today.   Report given to Cornel Younger flight RN, en route to  pt and transport to regional.

## 2019-04-11 ENCOUNTER — OFFICE VISIT (OUTPATIENT)
Dept: NEUROLOGY | Facility: MEDICAL CENTER | Age: 59
End: 2019-04-11
Payer: COMMERCIAL

## 2019-04-11 VITALS
DIASTOLIC BLOOD PRESSURE: 74 MMHG | OXYGEN SATURATION: 98 % | BODY MASS INDEX: 30.86 KG/M2 | SYSTOLIC BLOOD PRESSURE: 122 MMHG | HEIGHT: 66 IN | HEART RATE: 74 BPM | WEIGHT: 192 LBS | RESPIRATION RATE: 16 BRPM | TEMPERATURE: 97.3 F

## 2019-04-11 PROCEDURE — 99213 OFFICE O/P EST LOW 20 MIN: CPT | Performed by: PHYSICIAN ASSISTANT

## 2019-04-11 NOTE — PROGRESS NOTES
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    They report to me that they are missing work occasionally due to migraines    Review of the medical chart and interviewing patient, I completed paperwork and it is scanned into media.     RTC as previously scheduled.    Total time with this visit: 20    Minutes face-to-face with patient. More than 50% of this visit was spent reviewing medical chart and speaking with the patient about their condition and how it affects their ability to do their job.

## 2019-05-30 RX ORDER — TOPIRAMATE 100 MG/1
TABLET, FILM COATED ORAL
Qty: 60 TAB | Refills: 3 | Status: SHIPPED | OUTPATIENT
Start: 2019-05-30 | End: 2019-08-05 | Stop reason: SDUPTHER

## 2019-05-30 NOTE — TELEPHONE ENCOUNTER
Was the patient seen in the last year in this department? Yes    Does patient have an active prescription for medications requested? Yes    Received Request Via: Pharmacy      Pt is establishing with you on 8/5.

## 2019-05-30 NOTE — TELEPHONE ENCOUNTER
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  Most recent clinic note reviewed.  Patient is on topiramate and Depakote for headache prevention.  Dose of topiramate is 100 mg twice daily.  Will renew until patient is established with me in August 2019.

## 2019-08-05 ENCOUNTER — OFFICE VISIT (OUTPATIENT)
Dept: NEUROLOGY | Facility: MEDICAL CENTER | Age: 59
End: 2019-08-05
Payer: COMMERCIAL

## 2019-08-05 VITALS
HEIGHT: 66 IN | RESPIRATION RATE: 16 BRPM | TEMPERATURE: 97.6 F | WEIGHT: 185 LBS | BODY MASS INDEX: 29.73 KG/M2 | SYSTOLIC BLOOD PRESSURE: 118 MMHG | HEART RATE: 70 BPM | DIASTOLIC BLOOD PRESSURE: 72 MMHG | OXYGEN SATURATION: 97 %

## 2019-08-05 DIAGNOSIS — I67.1 CEREBRAL ANEURYSM WITHOUT RUPTURE: ICD-10-CM

## 2019-08-05 DIAGNOSIS — N95.9 MENOPAUSAL AND PERIMENOPAUSAL DISORDER: ICD-10-CM

## 2019-08-05 DIAGNOSIS — G93.5 CHIARI MALFORMATION TYPE I (HCC): ICD-10-CM

## 2019-08-05 PROCEDURE — 99215 OFFICE O/P EST HI 40 MIN: CPT | Performed by: PSYCHIATRY & NEUROLOGY

## 2019-08-05 RX ORDER — TOPIRAMATE 100 MG/1
TABLET, FILM COATED ORAL
Qty: 60 TAB | Refills: 5 | Status: SHIPPED | OUTPATIENT
Start: 2019-08-05 | End: 2020-04-28 | Stop reason: SDUPTHER

## 2019-08-05 RX ORDER — PROPRANOLOL HYDROCHLORIDE 20 MG/1
20 TABLET ORAL 2 TIMES DAILY
Qty: 60 TAB | Refills: 4 | Status: SHIPPED | OUTPATIENT
Start: 2019-08-05 | End: 2019-11-20 | Stop reason: SDUPTHER

## 2019-08-05 RX ORDER — DIVALPROEX SODIUM 250 MG/1
250 TABLET, EXTENDED RELEASE ORAL 2 TIMES DAILY
Qty: 60 TAB | Refills: 5 | Status: SHIPPED | OUTPATIENT
Start: 2019-08-05 | End: 2020-01-28

## 2019-08-05 ASSESSMENT — ENCOUNTER SYMPTOMS
FEVER: 0
SORE THROAT: 0
HALLUCINATIONS: 0
WEIGHT LOSS: 0
SHORTNESS OF BREATH: 0
FALLS: 0
BRUISES/BLEEDS EASILY: 0
EYE DISCHARGE: 0
ABDOMINAL PAIN: 0

## 2019-08-05 NOTE — PROGRESS NOTES
Chief Complaint   Patient presents with   • New Patient     Chronic migraine     Patient is referred by Dr. Cam for initial consult.    History of present illness:  Sharlene Garcia 59 y.o. female presents today for history of cerebral aneurysm without rupture, migraine, Chiari I malformation.   History is obtained from patient.  and Patient is accompanied by self. 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    Duration/timing: see below  Context:   Migraines: Intractable headache for years, temporal region with changing locations, throbbing, photosensitivity/noise sensitivity/worse with activity, N/V, duration of at least 4 hours, frequency of headache days are daily, 15/30 migraine days.   Cerebral aneurysm: Status close coiling and monitored by Dr. Hazel, coil is MRI compatible.  October 2015.  Chiari I malformation: chronic/stable  Location: As above  Quality: As above  Severity: Moderate to severe pain migraines  Modifying factors: See below  Associated signs/symptoms: occasional snoring, used to have choking episodes and now on wedge pillow   Denies: bladder incontinence, bowel incontinence, vision changes, weakness, numbness/tingling, swallowing difficulties, speech disturbance, depression, anxiety, loss of consciousness, hallucinations, abnormal movements, diplopia, autonomic symptoms, falls and snoring/apnea during sleep     Patient has tried:  -Topiramate 100 mg twice daily  -Depakote 250 mg twice daily  -Imitrex - with good benefit if taken on time, unknown dose   -Aleve/motrin - > 10 days a month, with fair benefit  -ONB - relief for 8 hours  -Botox - ordered but did not get scheduled  -Massage - possible benefit     States now she feels like warm water is painful for her when washing her hands. This is new. She was weeding this weekend and usually causes her some aches and pain in the joints and this time she was in so much pain she was crying.     Currently she has her own office, takes nap  every lunch. That helps her headaches.       Past medical history:   Past Medical History:   Diagnosis Date   • Acquired hypothyroidism 2019   • Allergy    • Anxiety     Due to financial issues, never took medication.   • ASTHMA    • Chronic bronchitis     Including multiple episodes of pnemonia, until    • GERD (gastroesophageal reflux disease)     Doing better now with weight loss 6/10.   • Headache(784.0)    • Hyperlipidemia LDL goal < 160 10/28/2010   • Menopausal and perimenopausal disorder    • Migraine     Rare, many years ago.       Past surgical history:   Past Surgical History:   Procedure Laterality Date   • RECOVERY  10/25/2015    Procedure: [facility] RECOVERY ONLY;  Surgeon: [facility];  Location: SURGERY [facility];  Service:    • CRISTINA BY LAPAROSCOPY  2011    Performed by LUIS ALBERTO ROBLES at SURGERY [facility]   • HYSTEROSCOPY WITH VIDEO OPERATIVE  2010    Performed by PETER MORALES at SURGERY SAME DAY [facility] ORS   • PB  DELIVERY ONLY     • PRIMARY C SECTION     • MAMMOPLASTY AUGMENTATION      And removal calcium deposits   • DC ORAL SURGERY PROCEDURE      wisdom teeth extraction   • BUNIONECTOMY  1978/1984    x2   • PB ENLARGE BREAST WITH IMPLANT         Family history:   Family History   Problem Relation Age of Onset   • Cancer Mother 53        Ovarian  at 57 from it   • Diabetes Father        Social history:   Tobacco Use   • Smoking status: Never Smoker   • Smokeless tobacco: Never Used   Substance and Sexual Activity   • Alcohol use: Yes     Alcohol/week: 0.0 - 0.5 oz     Comment: once a week   • Drug use: No   • Sexual activity: Yes     Partners: Male     Birth control/protection: Post-Menopausal     Comment: menopausal since        Current medications:   Current Outpatient Medications   Medication   • topiramate (TOPAMAX) 100 MG Tab   • divalproex ER (DEPAKOTE ER) 250 MG TABLET SR 24 HR   • propranolol (INDERAL) 20  "MG Tab   • levothyroxine (SYNTHROID) 50 MCG Tab   • cetirizine (ZYRTEC) 10 MG Tab   • atorvastatin (LIPITOR) 20 MG Tab   • diphenhydrAMINE (BENADRYL) 25 MG Tab     No current facility-administered medications for this visit.        Medication Allergy:  Allergies   Allergen Reactions   • Nkda [No Known Drug Allergy]    • Other Environmental        Review of Systems   Constitutional: Negative for fever and weight loss.   HENT: Negative for sore throat.    Eyes: Negative for discharge.   Respiratory: Negative for shortness of breath.    Cardiovascular: Negative for leg swelling.   Gastrointestinal: Negative for abdominal pain.   Genitourinary: Negative for dysuria.   Musculoskeletal: Negative for falls.   Skin: Negative for rash.   Neurological:        As per HPI   Endo/Heme/Allergies: Does not bruise/bleed easily.   Psychiatric/Behavioral: Negative for hallucinations.       Physical examination:   Vitals:    08/05/19 0840   BP: 118/72   BP Location: Left arm   Patient Position: Sitting   BP Cuff Size: Adult   Pulse: 70   Resp: 16   Temp: 36.4 °C (97.6 °F)   TempSrc: Temporal   SpO2: 97%   Weight: 83.9 kg (185 lb)   Height: 1.676 m (5' 6\")     General: Patient in well nourished in no apparent distress. Elevated BMI.   Eyes: Ophthalmoscopic examination performed but discs cannot be visualized well enough to characterize bilaterally.  HENT: Normocephalic, atraumatic. No tenderness to palpation of the APOORVA/JOAN  Cardiovascular: No lower extremity edema.  Respiratory: Normal respiratory effort.   Skin: No appreciable signs of acute rashes or bruising.   Musculoskeletal: No signs of joint or muscle swelling.   Psychiatric: Pleasant.     NEUROLOGICAL EXAM:   Mental status: Awake, alert and fully oriented to person, place, time and situation. Normal attention, concentration and fund of knowledge for education level.   Speech and language: Speech is fluent without errors and clear.  Cranial nerve exam:  II: Pupils are equally " round and reactive to light. Visual fields are intact by confrontation.  III, IV, VI: EOMI, no diplopia, no ptosis.  V: Sensation to light touch is normal over V1-3 distributions bilaterally.    VII: Facial movements are symmetrical. There is no facial droop. .  VIII: Hearing intact to soft speech and finger rub bilaterally  IX: Palate elevates symmetrically, uvula is midline. Dysarthria is not present.  XI: Shoulder shrug are symmetrical and strong.   XII: Tongue protrudes midline.     Motor exam:  Muscle tone is normal in all 4 limbs.    Muscle strength: Patient is limited effort due to soreness from prior gardening.  She is at least a 4-5 proximally upper and lower extremity.    Sensory exam:  Intact to Light touch in bilateral upper and lower extremity.    Deep tendon reflexes:       Right  Left  Biceps   1/4  1/4  Triceps  1/4  1/4  Brachioradialis 1/4  1/4  Knee jerk  1/4  1/4  Ankle jerk  0/4  0/4   bilateral toes are downgoing to plantar stimulation..    Coordination: shows a normal finger-nose-finger  Gait: Casual gait is normal., Heel walk is normal. and Toe walk is normal.      ANCILLARY DATA REVIEWED:   Lab Data Review:  Lab Results   Component Value Date/Time    WBC 4.7 (L) 09/21/2017 03:52 AM    RBC 3.87 (L) 09/21/2017 03:52 AM    HEMOGLOBIN 11.7 (L) 09/21/2017 03:52 AM    HEMATOCRIT 35.3 (L) 09/21/2017 03:52 AM    MCV 91.2 09/21/2017 03:52 AM    MCH 30.2 09/21/2017 03:52 AM    MCHC 33.1 (L) 09/21/2017 03:52 AM    MPV 11.8 09/21/2017 03:52 AM    NEUTSPOLYS 28.10 (L) 09/21/2017 03:52 AM    LYMPHOCYTES 58.80 (H) 09/21/2017 03:52 AM    MONOCYTES 6.40 09/21/2017 03:52 AM    EOSINOPHILS 5.40 09/21/2017 03:52 AM    BASOPHILS 0.90 09/21/2017 03:52 AM    HYPOCHROMIA 1+ 03/26/2010 10:07 AM      Lab Results   Component Value Date/Time    SODIUM 139 09/21/2017 03:52 AM    POTASSIUM 3.8 09/21/2017 03:52 AM    CHLORIDE 111 09/21/2017 03:52 AM    CO2 21 09/21/2017 03:52 AM    GLUCOSE 93 09/21/2017 03:52 AM    BUN 20  09/21/2017 03:52 AM    CREATININE 0.85 09/21/2017 03:52 AM     Lab Results   Component Value Date/Time    ASTSGOT 17 09/21/2017 0352    ALTSGPT 49 09/21/2017 0352    ALKPHOSPHAT 80 09/21/2017 0352    ALBUMIN 3.5 09/21/2017 0352     Lab Results   Component Value Date/Time    HBA1C 5.6 10/25/2015 03:12 PM      EEG routine June 2018 interpreted by Dr. Cam for feeling weird with a headache: Cortical dysfunction over the left    Imaging:   MRI brain without contrast September 2018:  1.  Chiari I malformation unchanged from previous exam.  2.  Developmental venous anomaly in the ventral midline deborah unchanged from previous exam.  3.  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  4.  Minimal periventricular and juxtacortical white matter changes consistent with chronic microvascular ischemic gliosis.    MRA head without contrast September 2018:  1.  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  2.  Consider neurointerventional radiology consult.    Records reviewed: Patient has seen Mishel Francis and Dr. Cam.  Mishel Francis filled out FMLA disability related to her chronic migraines.      ASSESSMENT AND PLAN:  1. Chronic intractable migraine without aura or status migrainosus: 15+ migraine days a month.  Cannot rule out component of medication overuse headache given her use of abortive therapies over 10 days/month.  Do not think there is a contribution from her cerebral aneurysm given DrYandel of her migraines.  Prior MRI brain without contrast September 2018 with Chiari I malformation (suspect incidental) with mild white matter changes but otherwise no concerning findings.  She is menopausal.  -Very long discussion with patient with regards to other treatment options to optimize her headache control including but not limited to Botox, CGRP antagonist, other oral medications.  We discussed the procedure descriptions, risks and benefits,  side effects.  After much discussion he is decided to do a trial of propranolol.  Asthma is minimal.  -Trial of propranolol 10 mg twice daily for a week then increase to 20 mill grams twice daily  -Treatment expectations discussed, headache diary recommended  -Continue topiramate 100 mg twice daily and Depakote 250 mg extended release twice daily for headache prevention  - CBC WITH DIFFERENTIAL; Future -monitoring while Depakote  - Comp Metabolic Panel; Future -monitoring while Depakote  -Continue massage  -Consider MRI C-spine if more concerning cervicogenic qualities    2. Cerebral aneurysm without rupture: History of.  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  At that time a neuro interventional radiology consult was recommended.  I do not believe she has seen one.  Her coil is MRI compatible.  - MR-MRA HEAD-W/O; Future -due to enlargement    3. Chiari malformation type I (HCC): No signs of increased intracranial pressure/idiopathic intracranial hypertension, likely incidental.  She does not have a Valsalva type headaches.  -Monitor    4. Menopausal and perimenopausal disorder: Low risk for Depakote and topiramate since she cannot get pregnant    FOLLOW-UP: Return for 3-4 months .  For medication management, follow-up labs and imaging  EDUCATION AND COUNSELING:  -I personally discussed the following with the patient:   Risks/benefits/side effects/alternatives of medication including but not limited to drowsiness, sedation, dizziness, increased risk for falls, cardiovascular effects (hypotension, cardiac arrhythmias, death), weight changes, increased risk for depression, anxiety, suicide, psychosis and mood changes and avoid abrupt cessation of medication., Diagnosis, prognosis, and treatment options discussed with patient at length.  , Recommend regular exercise, proper hydration, healthy diet and stress  reduction.  and The above procedure risks/benefits/side effects/alternatives.    The patient understands and agrees that due to the complexity of his/her diagnosis, results of any testing and further recommendations will typically be discussed/made during a face to face encounter in my office. The patient and/or family further understands it is their responsibility to keep proper follow up.     Disclaimer  This dictation was created using voice recognition software. I have made every reasonable attempt to avoid dictation errors, but this document may contain an error not identified before finalizing. If the error changes the accuracy of the document, I would appreciate it being brought to my attention. Thank you very much.     Bailey Londono MD  Neurology, Neurophysiology  [facility]

## 2019-08-07 ENCOUNTER — PATIENT MESSAGE (OUTPATIENT)
Dept: NEUROLOGY | Facility: MEDICAL CENTER | Age: 59
End: 2019-08-07

## 2019-08-07 DIAGNOSIS — F40.240 CLAUSTROPHOBIA: ICD-10-CM

## 2019-08-07 RX ORDER — ALPRAZOLAM 0.25 MG/1
0.25 TABLET ORAL PRN
Qty: 2 TAB | Refills: 0 | Status: SHIPPED | OUTPATIENT
Start: 2019-08-07 | End: 2019-08-08

## 2019-08-07 NOTE — PROGRESS NOTES
Narx Check report reviewed.  Patient is low risk.  She has sent me a my chart message needing this for claustrophobia for MRA.  I will provide this for comfort.  We will communicate patient via my chart to have a  and to be aware of sedation.  Avoid taking this with other sedating medications and substances.    Bailey Londono MD  Neurology - Neurophysiology  [facility]

## 2019-08-17 ENCOUNTER — HOSPITAL ENCOUNTER (OUTPATIENT)
Dept: RADIOLOGY | Facility: MEDICAL CENTER | Age: 59
End: 2019-08-17
Attending: PSYCHIATRY & NEUROLOGY
Payer: COMMERCIAL

## 2019-08-17 DIAGNOSIS — I67.1 CEREBRAL ANEURYSM WITHOUT RUPTURE: ICD-10-CM

## 2019-08-17 DIAGNOSIS — N95.9 MENOPAUSAL AND PERIMENOPAUSAL DISORDER: ICD-10-CM

## 2019-08-17 DIAGNOSIS — G93.5 CHIARI MALFORMATION TYPE I (HCC): ICD-10-CM

## 2019-08-17 PROCEDURE — 70544 MR ANGIOGRAPHY HEAD W/O DYE: CPT

## 2019-08-27 RX ORDER — SUMATRIPTAN 100 MG/1
100 TABLET, FILM COATED ORAL
Qty: 10 TAB | Refills: 3 | Status: SHIPPED | OUTPATIENT
Start: 2019-08-27 | End: 2020-04-08

## 2019-08-27 NOTE — TELEPHONE ENCOUNTER
Was the patient seen in the last year in this department? Yes    Does patient have an active prescription for medications requested? Yes    Received Request Via: Pharmacy    Dr Bardales is OOTO and patient it ouf of medication.  Dr. Novoa, can you please refill medication for patient? Thank you.

## 2019-11-21 RX ORDER — PROPRANOLOL HYDROCHLORIDE 20 MG/1
TABLET ORAL
Qty: 180 TAB | Refills: 1 | Status: SHIPPED | OUTPATIENT
Start: 2019-11-21 | End: 2020-05-26 | Stop reason: SDUPTHER

## 2019-12-12 ENCOUNTER — OFFICE VISIT (OUTPATIENT)
Dept: URGENT CARE | Facility: MEDICAL CENTER | Age: 59
End: 2019-12-12
Payer: COMMERCIAL

## 2019-12-12 VITALS
SYSTOLIC BLOOD PRESSURE: 142 MMHG | BODY MASS INDEX: 30.53 KG/M2 | HEART RATE: 62 BPM | HEIGHT: 66 IN | OXYGEN SATURATION: 97 % | TEMPERATURE: 96.7 F | DIASTOLIC BLOOD PRESSURE: 94 MMHG | WEIGHT: 190 LBS

## 2019-12-12 DIAGNOSIS — G43.019 INTRACTABLE MIGRAINE WITHOUT AURA AND WITHOUT STATUS MIGRAINOSUS: ICD-10-CM

## 2019-12-12 DIAGNOSIS — J40 BRONCHITIS: Primary | ICD-10-CM

## 2019-12-12 DIAGNOSIS — J06.9 URI WITH COUGH AND CONGESTION: ICD-10-CM

## 2019-12-12 PROCEDURE — 99214 OFFICE O/P EST MOD 30 MIN: CPT | Performed by: PHYSICIAN ASSISTANT

## 2019-12-12 RX ORDER — DOXYCYCLINE HYCLATE 100 MG
100 TABLET ORAL 2 TIMES DAILY
Qty: 14 TAB | Refills: 0 | Status: SHIPPED | OUTPATIENT
Start: 2019-12-12 | End: 2019-12-19

## 2019-12-12 RX ORDER — DEXTROMETHORPHAN HYDROBROMIDE AND PROMETHAZINE HYDROCHLORIDE 15; 6.25 MG/5ML; MG/5ML
5 SYRUP ORAL EVERY 4 HOURS PRN
Qty: 120 ML | Refills: 0 | Status: SHIPPED | OUTPATIENT
Start: 2019-12-12 | End: 2020-05-26

## 2019-12-12 RX ORDER — METHYLPREDNISOLONE 4 MG/1
TABLET ORAL
Qty: 21 TAB | Refills: 0 | Status: SHIPPED | OUTPATIENT
Start: 2019-12-12 | End: 2020-05-26

## 2019-12-12 RX ORDER — PROMETHAZINE HYDROCHLORIDE 25 MG/1
25 TABLET ORAL EVERY 6 HOURS PRN
Qty: 30 TAB | Refills: 0 | Status: SHIPPED | OUTPATIENT
Start: 2019-12-12

## 2019-12-12 RX ORDER — BUTALBITAL, ACETAMINOPHEN, CAFFEINE AND CODEINE PHOSPHATE 50; 325; 40; 30 MG/1; MG/1; MG/1; MG/1
1 CAPSULE ORAL EVERY 6 HOURS PRN
Qty: 30 CAP | Refills: 0 | Status: SHIPPED | OUTPATIENT
Start: 2019-12-12 | End: 2019-12-17

## 2019-12-12 RX ORDER — KETOROLAC TROMETHAMINE 30 MG/ML
60 INJECTION, SOLUTION INTRAMUSCULAR; INTRAVENOUS ONCE
Status: COMPLETED | OUTPATIENT
Start: 2019-12-12 | End: 2019-12-12

## 2019-12-12 RX ADMIN — KETOROLAC TROMETHAMINE 60 MG: 30 INJECTION, SOLUTION INTRAMUSCULAR; INTRAVENOUS at 08:18

## 2019-12-12 NOTE — PROGRESS NOTES
Subjective:      Pt is a 59 y.o. female who presents with Cough (respiratory infection X2 weeks )            HPI  This is a new problem. PT presents to  clinic today complaining of sore throat,  pressure in ears, cough, fatigue, runny nose, wheezing and SOB. Pt notes coughing fits are triggering her chronic migraines and aleve, Imitrex and ibuprofen are not helping.  PT denies CP, NVD, abdominal pain, joint pain. PT states these symptoms began around 3 days ago. PT states the pain is a 9/10 with coughing fits, aching in nature and worse at night. Pt has not taken any RX medications for this condition. The pt's medication list, problem list, and allergies have been evaluated and reviewed during today's visit.    PMH:  Past Medical History:   Diagnosis Date   • Acquired hypothyroidism 2019   • Allergy    • Anxiety     Due to financial issues, never took medication.   • ASTHMA    • Chronic bronchitis     Including multiple episodes of pnemonia, until    • GERD (gastroesophageal reflux disease)     Doing better now with weight loss 6/10.   • Headache(784.0)    • Hyperlipidemia LDL goal < 160 10/28/2010   • Menopausal and perimenopausal disorder    • Migraine     Rare, many years ago.       PSH:  Past Surgical History:   Procedure Laterality Date   • RECOVERY  10/25/2015    Procedure: [facility] RECOVERY ONLY;  Surgeon: [facility] Surgery;  Location: SURGERY [facility];  Service:    • CRISTINA BY LAPAROSCOPY  2011    Performed by LUIS ALBERTO ROBLES at SURGERY [facility]   • HYSTEROSCOPY WITH VIDEO OPERATIVE  2010    Performed by PETER MORALES at SURGERY SAME DAY [facility] OR[facility]   • PB  DELIVERY ONLY     • PRIMARY C SECTION     • MAMMOPLASTY AUGMENTATION      And removal calcium deposits   • [place] ORAL SURGERY PROCEDURE      wisdom teeth extraction   • BUNIONECTOMY  1978/1984    x2   • PB ENLARGE BREAST WITH IMPLANT         Fam Hx:    family history includes Cancer (age  of onset: 53) in her mother; Diabetes in her father.  Family Status   Relation Name Status   • Mo   at age 57        ovarian cancer   • Fa  Alive   • MAunt  Alive   • MGMo          MVA   • MGFa          unknown   • PGMo   at age 89   • PGFa   at age 89        Unknown       Soc HX:  Social History     Socioeconomic History   • Marital status:      Spouse name: Not on file   • Number of children: Not on file   • Years of education: Not on file   • Highest education level: Not on file   Occupational History   • Not on file   Social Needs   • Financial resource strain: Not on file   • Food insecurity:     Worry: Not on file     Inability: Not on file   • Transportation needs:     Medical: Not on file     Non-medical: Not on file   Tobacco Use   • Smoking status: Never Smoker   • Smokeless tobacco: Never Used   Substance and Sexual Activity   • Alcohol use: Yes     Alcohol/week: 0.0 - 0.5 oz     Comment: once a week   • Drug use: No   • Sexual activity: Yes     Partners: Male     Birth control/protection: Post-Menopausal     Comment: menopausal since    Lifestyle   • Physical activity:     Days per week: Not on file     Minutes per session: Not on file   • Stress: Not on file   Relationships   • Social connections:     Talks on phone: Not on file     Gets together: Not on file     Attends Mosque service: Not on file     Active member of club or organization: Not on file     Attends meetings of clubs or organizations: Not on file     Relationship status: Not on file   • Intimate partner violence:     Fear of current or ex partner: Not on file     Emotionally abused: Not on file     Physically abused: Not on file     Forced sexual activity: Not on file   Other Topics Concern   • Not on file   Social History Narrative   • Not on file         Medications:    Current Outpatient Medications:   •  promethazine (PHENERGAN) 25 MG Tab, Take 1 Tab by mouth every 6 hours as  needed for Nausea/Vomiting., Disp: 30 Tab, Rfl: 0  •  butalbital-acetaminophen-caffeine-codeine (FIORICET W/CODEINE) -86-30 MG per capsule, Take 1 Cap by mouth every 6 hours as needed for Migraine for up to 5 days., Disp: 30 Cap, Rfl: 0  •  doxycycline (VIBRAMYCIN) 100 MG Tab, Take 1 Tab by mouth 2 times a day for 7 days., Disp: 14 Tab, Rfl: 0  •  methylPREDNISolone (MEDROL DOSEPAK) 4 MG Tablet Therapy Pack, Follow schedule on package instructions., Disp: 21 Tab, Rfl: 0  •  promethazine-dextromethorphan (PROMETHAZINE-DM) 6.25-15 MG/5ML syrup, Take 5 mL by mouth every four hours as needed., Disp: 120 mL, Rfl: 0  •  propranolol (INDERAL) 20 MG Tab, TAKE 1 TABLET BY MOUTH TWICE A DAY, Disp: 180 Tab, Rfl: 1  •  sumatriptan (IMITREX) 100 MG tablet, Take 1 Tab by mouth Once PRN for Migraine for up to 1 dose., Disp: 10 Tab, Rfl: 3  •  topiramate (TOPAMAX) 100 MG Tab, TAKE 1 TABLET BY MOUTH TWICE A DAY, Disp: 60 Tab, Rfl: 5  •  divalproex ER (DEPAKOTE ER) 250 MG TABLET SR 24 HR, Take 1 Tab by mouth 2 Times a Day., Disp: 60 Tab, Rfl: 5  •  levothyroxine (SYNTHROID) 50 MCG Tab, Take 50 mcg by mouth Every morning on an empty stomach., Disp: , Rfl:   •  cetirizine (ZYRTEC) 10 MG Tab, Take 1 Tab by mouth every day., Disp: 30 Tab, Rfl:   •  atorvastatin (LIPITOR) 20 MG Tab, Take 1 Tab by mouth every bedtime., Disp: 30 Tab, Rfl:   •  diphenhydrAMINE (BENADRYL) 25 MG Tab, Take 1 tablet by mouth at bedtime as needed.  May repeat 1 time. (insomnia)., Disp: 30 Tab, Rfl: 0    Current Facility-Administered Medications:   •  ketorolac (TORADOL) injection 60 mg, 60 mg, Intramuscular, Once, Herman Mejia P.A.-C.      Allergies:  Nkda [no known drug allergy] and Other environmental    ROS    Review of Systems   Constitutional: Positive for malaise/fatigue. Negative for fever and diaphoresis.   HENT: Positive for congestion and sore throat. Negative for ear discharge, hearing loss, nosebleeds and tinnitus.    Eyes: Negative for  "blurred vision, double vision and photophobia.   Respiratory: Positive for cough, sputum production, shortness of breath and wheezing. Negative for hemoptysis.    Cardiovascular: Negative for chest pain and palpitations.   Gastrointestinal: Negative for nausea, vomiting, abdominal pain, diarrhea and constipation.   Genitourinary: Negative for dysuria and flank pain.   Musculoskeletal: Negative for joint pain and myalgias.   Skin: Negative for itching and rash.   Neurological:  Negative for dizziness, tingling and weakness. +migraine headaches  Endo/Heme/Allergies: Does not bruise/bleed easily.   Psychiatric/Behavioral: Negative for depression. The patient is not nervous/anxious.           Objective:     /94 (BP Location: Left arm, Patient Position: Sitting)   Pulse 62   Temp 35.9 °C (96.7 °F) (Temporal)   Ht 1.676 m (5' 6\")   Wt 86.2 kg (190 lb)   SpO2 97%   BMI 30.67 kg/m²      Physical Exam       Physical Exam   Constitutional: PT is oriented to person, place, and time. PT appears well-developed and well-nourished. +Mild distress from active migraines.   HENT:   Head: Normocephalic and atraumatic.   Right Ear: Hearing, tympanic membrane, external ear and ear canal normal.   Left Ear: Hearing, tympanic membrane, external ear and ear canal normal.   Nose: Mucosal edema, rhinorrhea and sinus tenderness present. Right sinus exhibits frontal sinus tenderness. Left sinus exhibits frontal sinus tenderness.   Mouth/Throat: Uvula is midline. Mucous membranes are pale. Posterior oropharyngeal edema and posterior oropharyngeal erythema present. No oropharyngeal exudate.   Eyes: Conjunctivae normal and EOM are normal. Pupils are equal, round, and reactive to light. Right eye exhibits no discharge. Left eye exhibits no discharge.   Neck: Normal range of motion. Neck supple. No thyromegaly present.   Cardiovascular: Normal rate, regular rhythm, normal heart sounds and intact distal pulses.  Exam reveals no gallop " and no friction rub.    No murmur heard.  Pulmonary/Chest: Effort normal. No respiratory distress. PT has wheezes. PT has no rales. PT exhibits tenderness.   Abdominal: Soft. Bowel sounds are normal. PT exhibits no distension and no mass. There is no tenderness. There is no rebound and no guarding.   Musculoskeletal: Normal range of motion. PT exhibits no edema and no tenderness.   Lymphadenopathy:     PT has no cervical adenopathy.   Neurological: Pt is alert and oriented to person, place, and time. Pt has normal reflexes. No cranial nerve deficit.   Skin: Skin is warm and dry. No rash noted. No erythema.   Psychiatric: PT has a normal mood and affect. Pt behavior is normal. Judgment and thought content normal.        Assessment/Plan:       1. Bronchitis    - doxycycline (VIBRAMYCIN) 100 MG Tab; Take 1 Tab by mouth 2 times a day for 7 days.  Dispense: 14 Tab; Refill: 0  - methylPREDNISolone (MEDROL DOSEPAK) 4 MG Tablet Therapy Pack; Follow schedule on package instructions.  Dispense: 21 Tab; Refill: 0    2. URI with cough and congestion    - methylPREDNISolone (MEDROL DOSEPAK) 4 MG Tablet Therapy Pack; Follow schedule on package instructions.  Dispense: 21 Tab; Refill: 0  - promethazine-dextromethorphan (PROMETHAZINE-DM) 6.25-15 MG/5ML syrup; Take 5 mL by mouth every four hours as needed.  Dispense: 120 mL; Refill: 0    3. Intractable migraine without aura and without status migrainosus    - ketorolac (TORADOL) injection 60 mg  - promethazine (PHENERGAN) 25 MG Tab; Take 1 Tab by mouth every 6 hours as needed for Nausea/Vomiting.  Dispense: 30 Tab; Refill: 0  - butalbital-acetaminophen-caffeine-codeine (FIORICET W/CODEINE) -13-30 MG per capsule; Take 1 Cap by mouth every 6 hours as needed for Migraine for up to 5 days.  Dispense: 30 Cap; Refill: 0  - methylPREDNISolone (MEDROL DOSEPAK) 4 MG Tablet Therapy Pack; Follow schedule on package instructions.  Dispense: 21 Tab; Refill: 0    [place]  Aware web site  evaluation: I have obtained and reviewed patient utilization report from [facility] pharmacy database prior to writing prescription for controlled substance II, III or IV per [place] bill . Based on the report and my clinical assessment the prescription is medically necessary.   NSAIDs for pain 1-5, Fioricet for pain 6-10 or to help get to sleep.  Concern for worsening symptoms of URI which shortly could transition to pneumonia and worsening sinus congestion and infection with powerful cough keeping pt up at night as they must sleep upright to avoid coughing fits.  Diff DX: Bronchitis, Sinusitis, Pneumonia, Influenza, Viral URI, Allergies  Rest, fluids encouraged.  OTC decongestant for congestion/cough  AVS with medical info given.  Pt was in full understanding and agreement with the plan.  Differential diagnosis, natural history, supportive care, and indications for immediate follow-up discussed. All questions answered. Patient agrees with the plan of care.  Follow-up as needed if symptoms worsen or fail to improve to PCP, Urgent care or Emergency Room.

## 2019-12-12 NOTE — LETTER
[facility]  [facility]  [address]  [place] [address]     December 12, 2019    Patient: Sharlene Garcia   YOB: 1960   Date of Visit: 12/12/2019       To Whom It May Concern:    Sharlene Garcia was seen and treated in our department on 12/12/2019. Please excuse from work for today.     Sincerely,     Maura Adhikari, Med Ass't

## 2020-01-29 RX ORDER — DIVALPROEX SODIUM 250 MG/1
TABLET, EXTENDED RELEASE ORAL
Qty: 180 TAB | Refills: 1 | Status: SHIPPED | OUTPATIENT
Start: 2020-01-29 | End: 2020-05-26 | Stop reason: SDUPTHER

## 2020-04-09 ENCOUNTER — TELEPHONE (OUTPATIENT)
Dept: NEUROLOGY | Facility: MEDICAL CENTER | Age: 60
End: 2020-04-09

## 2020-04-28 RX ORDER — TOPIRAMATE 100 MG/1
TABLET, FILM COATED ORAL
Qty: 180 TAB | Refills: 1 | OUTPATIENT
Start: 2020-04-28

## 2020-04-28 NOTE — TELEPHONE ENCOUNTER
Received request via: Patient     Was the patient seen in the last year in this department? Yes    Does the patient have an active prescription (recently filled or refills available) for medication(s) requested? No     Spoke with patient notified her medication was denied due to patient not having a follow up appointment with Dr Londono , patient is almost out of the Topiramate and is concerned she won't be able to make it for her appointment.     She is scheduled to be seen on 05/26/2020 at 11:00 AM with Dr Londono. Patient would like to know if she can get medication filled until appointment date ?     If not please let me know so I can notify patient .

## 2020-04-29 RX ORDER — TOPIRAMATE 100 MG/1
TABLET, FILM COATED ORAL
Qty: 60 TAB | Refills: 0 | Status: SHIPPED | OUTPATIENT
Start: 2020-04-29 | End: 2020-05-26

## 2020-05-26 ENCOUNTER — OFFICE VISIT (OUTPATIENT)
Dept: NEUROLOGY | Facility: MEDICAL CENTER | Age: 60
End: 2020-05-26
Payer: COMMERCIAL

## 2020-05-26 VITALS
WEIGHT: 189.38 LBS | SYSTOLIC BLOOD PRESSURE: 122 MMHG | OXYGEN SATURATION: 98 % | HEIGHT: 66 IN | DIASTOLIC BLOOD PRESSURE: 76 MMHG | TEMPERATURE: 97.7 F | HEART RATE: 66 BPM | BODY MASS INDEX: 30.44 KG/M2

## 2020-05-26 DIAGNOSIS — G93.5 CHIARI MALFORMATION TYPE I (HCC): ICD-10-CM

## 2020-05-26 DIAGNOSIS — I67.1 CEREBRAL ANEURYSM WITHOUT RUPTURE: ICD-10-CM

## 2020-05-26 PROCEDURE — 99214 OFFICE O/P EST MOD 30 MIN: CPT | Performed by: PSYCHIATRY & NEUROLOGY

## 2020-05-26 RX ORDER — DIVALPROEX SODIUM 250 MG/1
TABLET, EXTENDED RELEASE ORAL
Qty: 180 TAB | Refills: 3 | Status: SHIPPED | OUTPATIENT
Start: 2020-05-26 | End: 2021-07-26

## 2020-05-26 RX ORDER — PROPRANOLOL HYDROCHLORIDE 20 MG/1
TABLET ORAL
Qty: 180 TAB | Refills: 3 | Status: SHIPPED | OUTPATIENT
Start: 2020-05-26 | End: 2021-04-27

## 2020-05-26 RX ORDER — SUMATRIPTAN 100 MG/1
TABLET, FILM COATED ORAL
Qty: 8 TAB | Refills: 5 | Status: SHIPPED | OUTPATIENT
Start: 2020-05-26 | End: 2020-09-08

## 2020-05-26 RX ORDER — TOPIRAMATE 50 MG/1
TABLET, FILM COATED ORAL
Qty: 60 TAB | Refills: 11 | Status: SHIPPED | OUTPATIENT
Start: 2020-05-26 | End: 2021-04-19

## 2020-05-26 ASSESSMENT — ENCOUNTER SYMPTOMS
DIZZINESS: 1
WEIGHT LOSS: 0
HALLUCINATIONS: 0
SORE THROAT: 0
FEVER: 0
SHORTNESS OF BREATH: 0
FALLS: 0

## 2020-05-26 NOTE — PROGRESS NOTES
Chief Complaint   Patient presents with   • Headache     follow up     History of present illness:  Sharlene Garcia 59 y.o. female presents today for history of cerebral aneurysm without rupture, migraine, Chiari I malformation.   She is a prior patient of Dr. Cam.  History is obtained from patient.  and Patient is accompanied by self. 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     Duration/timing: see below  Context:   Migraines: Intractable headache for years, temporal region with changing locations, throbbing, photosensitivity/noise sensitivity/worse with activity, N/V, duration of at least 4 hours, frequency of headache days are daily, 15/30 migraine days.   Cerebral aneurysm: Status close coiling and monitored by Dr. Hazel, coil is MRI compatible.  October 2015.  Chiari I malformation: chronic/stable  Location: As above  Quality: As above  Severity: Moderate to severe pain migraines  Modifying factors: See below  Associated signs/symptoms: occasional snoring, used to have choking episodes and now on wedge pillow   Denies: bladder incontinence, bowel incontinence, vision changes, weakness, numbness/tingling, swallowing difficulties, speech disturbance, depression, anxiety, loss of consciousness, hallucinations, abnormal movements, diplopia, autonomic symptoms, falls and snoring/apnea during sleep     Patient has tried:  -Topiramate 100 mg twice daily; stopped in 5/2020 no refills, restarted 5/2020  -Depakote 250 mg twice daily  -Imitrex - with good benefit if taken on time, unknown dose   -Aleve/motrin - taken almost every day  -ONB - relief for 8 hours  -Botox - patient declines due to concerns of side effects  -Massage - possible benefit   -Propranolol - denies side effects (dizziness?), 20mg BID    Subjective: Patient was last seen in neurology clinic August 2019.    Migraines: headaches are there all the time.  Migraine frequency is otherwise unchanged from what is mentioned above.  Imitrex taken  PRN - 3. PRN OTC meds (Aleve) - taken almost every day. Does breathing exercises and prevention techniques to help reduce migraine frequency and abort the headache. Propranolol helped migraines when first started - the migraines frequency was improved. She has been off topiramate for a week with worsening due to lack of refills.  She declines Botox due to to what she has heard about it.    Dizziness: states when she stands up then she falls over. Not lightheadedness. She gets a wave of dizziness when she is upright and walking. Never happens when she is sitting. Occurs soon after she gets up. No LOC. The dizziness is described as room spinning. Tinnitus+. No clear hearing loss.  She saw ENT but has not seen them in over a year.  She was lost to follow-up due to father-in-law's death.        Past medical history:   Past Medical History:   Diagnosis Date   • Acquired hypothyroidism 2019   • Allergy    • Anxiety     Due to financial issues, never took medication.   • ASTHMA    • Chronic bronchitis     Including multiple episodes of pnemonia, until    • GERD (gastroesophageal reflux disease)     Doing better now with weight loss 6/10.   • Headache(784.0)    • Hyperlipidemia LDL goal < 160 10/28/2010   • Menopausal and perimenopausal disorder    • Migraine     Rare, many years ago.       Past surgical history:   Past Surgical History:   Procedure Laterality Date   • RECOVERY  10/25/2015    Procedure: [facility] RECOVERY ONLY;  Surgeon: [facility];  Location: SURGERY [facility];  Service:    • CRISTINA BY LAPAROSCOPY  2011    Performed by LUIS ALBERTO ROBLES at SURGERY [facility] ORS   • HYSTEROSCOPY WITH VIDEO OPERATIVE  2010    Performed by PETER MORALES at SURGERY SAME DAY [facility] ORS   • PB  DELIVERY ONLY     • PRIMARY C SECTION     • MAMMOPLASTY AUGMENTATION      And removal calcium deposits   • DC ORAL SURGERY PROCEDURE      wisdom teeth extraction   •  BUNIONECTOMY  1978/1984    x2   • PB ENLARGE BREAST WITH IMPLANT         Family history:   Family History   Problem Relation Age of Onset   • Cancer Mother 53        Ovarian  at 57 from it   • Diabetes Father        Social history:   Tobacco Use   • Smoking status: Never Smoker   • Smokeless tobacco: Never Used   Substance and Sexual Activity   • Alcohol use: Yes     Alcohol/week: 0.0 - 0.5 oz     Comment: once a week   • Drug use: No   • Sexual activity: Yes     Partners: Male     Birth control/protection: Post-Menopausal     Comment: menopausal since        Current medications:   Current Outpatient Medications   Medication   • topiramate (TOPAMAX) 100 MG Tab   • sumatriptan (IMITREX) 100 MG tablet   • divalproex ER (DEPAKOTE ER) 250 MG TABLET SR 24 HR   • promethazine (PHENERGAN) 25 MG Tab   • methylPREDNISolone (MEDROL DOSEPAK) 4 MG Tablet Therapy Pack   • promethazine-dextromethorphan (PROMETHAZINE-DM) 6.25-15 MG/5ML syrup   • propranolol (INDERAL) 20 MG Tab   • levothyroxine (SYNTHROID) 50 MCG Tab   • cetirizine (ZYRTEC) 10 MG Tab   • atorvastatin (LIPITOR) 20 MG Tab   • diphenhydrAMINE (BENADRYL) 25 MG Tab     No current facility-administered medications for this visit.        Medication Allergy:  Allergies   Allergen Reactions   • Nkda [No Known Drug Allergy]    • Other Environmental        Review of Systems   Constitutional: Negative for fever and weight loss.   HENT: Negative for sore throat.    Respiratory: Negative for shortness of breath.    Cardiovascular: Negative for leg swelling.   Musculoskeletal: Negative for falls.   Skin: Negative for rash.   Neurological: Positive for dizziness.        As per HPI   Psychiatric/Behavioral: Negative for hallucinations.       Physical examination:   Vitals:    20 1050   BP: 122/76   BP Location: Left arm   Patient Position: Sitting   BP Cuff Size: Adult   Pulse: 66   Temp: 36.5 °C (97.7 °F)   SpO2: 98%   Weight: 85.9 kg (189 lb 6 oz)   Height: 1.676 m  "(5' 6\")     General: Patient in well nourished in no apparent distress. Elevated BMI.   Eyes: Ophthalmoscopic examination performed but discs cannot be visualized well enough to characterize bilaterally.  Prior exam  HENT: Normocephalic, atraumatic. Mallapatic score 3  Cardiovascular: No lower extremity edema.  Respiratory: Normal respiratory effort.   Skin: No appreciable signs of acute rashes or bruising.   Musculoskeletal: No signs of joint or muscle swelling.   Psychiatric: Pleasant.     NEUROLOGICAL EXAM:   Mental status: Awake, alert and fully oriented to person, place, time and situation. Normal attention, concentration and fund of knowledge for education level.   Speech and language: Speech is fluent without errors and clear.  Cranial nerve exam:  II: Pupils are equally round and reactive to light. Visual fields are intact by confrontation.  Prior exam  III, IV, VI: EOMI, no diplopia, no ptosis.  V: Sensation to light touch is normal over V1-3 distributions bilaterally.    VII: Facial movements are symmetrical. There is no facial droop.   VIII: Hearing intact to soft speech and finger rub bilaterally  IX: Dysarthria is not present.  XI: Shoulder shrug are symmetrical and strong.   XII: Tongue protrudes midline.     Motor exam:  Muscle tone is normal in all 4 limbs.    Muscle strength: Patient is limited effort due to soreness from prior gardening (this is not changed today).  She is at least a 4-5 proximally upper and lower extremity.    Sensory exam:  Intact to Light touch in bilateral upper and lower extremity.    Deep tendon reflexes: unchanged       Right  Left  Biceps   1/4  1/4  Triceps  1/4  1/4  Brachioradialis 1/4  1/4  Knee jerk  1/4  1/4  Ankle jerk  0/4  0/4   bilateral toes are downgoing to plantar stimulation..    Coordination: shows a normal finger-nose-finger  Gait: Casual gait is normal.  Other than some astasia-abasia      ANCILLARY DATA REVIEWED:   Lab Data Review:  Lab Results   Component " Value Date/Time    WBC 4.7 (L) 09/21/2017 03:52 AM    RBC 3.87 (L) 09/21/2017 03:52 AM    HEMOGLOBIN 11.7 (L) 09/21/2017 03:52 AM    HEMATOCRIT 35.3 (L) 09/21/2017 03:52 AM    MCV 91.2 09/21/2017 03:52 AM    MCH 30.2 09/21/2017 03:52 AM    MCHC 33.1 (L) 09/21/2017 03:52 AM    MPV 11.8 09/21/2017 03:52 AM    NEUTSPOLYS 28.10 (L) 09/21/2017 03:52 AM    LYMPHOCYTES 58.80 (H) 09/21/2017 03:52 AM    MONOCYTES 6.40 09/21/2017 03:52 AM    EOSINOPHILS 5.40 09/21/2017 03:52 AM    BASOPHILS 0.90 09/21/2017 03:52 AM    HYPOCHROMIA 1+ 03/26/2010 10:07 AM      Lab Results   Component Value Date/Time    SODIUM 139 09/21/2017 03:52 AM    POTASSIUM 3.8 09/21/2017 03:52 AM    CHLORIDE 111 09/21/2017 03:52 AM    CO2 21 09/21/2017 03:52 AM    GLUCOSE 93 09/21/2017 03:52 AM    BUN 20 09/21/2017 03:52 AM    CREATININE 0.85 09/21/2017 03:52 AM     Lab Results   Component Value Date/Time    ASTSGOT 17 09/21/2017 0352    ALTSGPT 49 09/21/2017 0352    ALKPHOSPHAT 80 09/21/2017 0352    ALBUMIN 3.5 09/21/2017 0352     Lab Results   Component Value Date/Time    HBA1C 5.6 10/25/2015 03:12 PM      EEG routine June 2018 interpreted by Dr. Cam for feeling weird with a headache: Cortical dysfunction over the left    Imaging:   MRI brain without contrast September 2018:  1.  Chiari I malformation unchanged from previous exam.  2.  Developmental venous anomaly in the ventral midline deborah unchanged from previous exam.  3.  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  4.  Minimal periventricular and juxtacortical white matter changes consistent with chronic microvascular ischemic gliosis.    MRA head without contrast September 2019:  Stable 4 mm bilobed residual neck of previously coiled left carotid terminus aneurysm.    Records reviewed: None      ASSESSMENT AND PLAN:  1. Chronic intractable migraine without aura or status migrainosus: 15+ migraine days a month.  Cannot rule out component of medication overuse headache given  "her use of abortive therapies over 10 days/month.  Do not think there is a contribution from her cerebral aneurysm given DrYandel of her migraines.  Prior MRI brain without contrast September 2018 with Chiari I malformation (suspect incidental) with mild white matter changes but otherwise no concerning findings.  She is menopausal.  -Continue propranolol 20 mg twice daily, hold on further titration due to reports of \"dizziness\" that is somewhat nonspecific, refills provided today  -Continue Depakote 250 mg extended release twice daily, refills provided today  -Restart topiramate 50 mg tablet with taper to goal dose of 100 mg daily, will consider increasing dose to 100 mg twice daily as previously prescribed to history of Chiari formation  -Thorough discussion with regards to treatment options, medication overuse headache, excessive use of Aleve, reasons for reducing Aleve/NSAID intake, risk/benefits/side effects of medication such as Botox.  Patient would prefer behavioral techniques to reduce headache frequency more so than medication.  However this may be challenging after returning to work.  Consider work note/FMLA if needed.  Will reevaluate headaches after reinitiation of topiramate.  Consider CGRP antagonists.  -Continue topiramate 100 mg twice daily and Depakote 250 mg extended release twice daily for headache prevention  - CBC WITH DIFFERENTIAL; Future -monitoring while Depakote, reordered today  - Comp Metabolic Panel; Future -monitoring while Depakote, reordered today  -Continue behavioral techniques to prevent headaches  -Consider MRI C-spine if more concerning cervicogenic qualities    2. Cerebral aneurysm without rupture: History of.  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  At that time a neuro interventional radiology consult was recommended.  I do not believe she has seen one.  Her coil is MRI " compatible.  - MR-MRA HEAD-W/O; Future -repeat annually, next due September 2020  -Blood pressure control, will defer to PCP    3. Chiari malformation type I (HCC): No signs of increased intracranial pressure/idiopathic intracranial hypertension, likely incidental.  She does not have a Valsalva type headaches.  She is on topiramate.  -Monitor    4. Menopausal and perimenopausal disorder: Low risk for Depakote and topiramate since she cannot get pregnant    5. Dizziness, new: Patient describes it as room spinning, vertigo which she has had in the past.  In the setting of tinnitus.  Due to the worsening and uncertain diagnosis by ENT I think is reasonable to refer turned back to them for evaluation.  Differential does include Ménière's disease.  Denies any exacerbation with turning her head or trouble with driving.  No focal exam findings to suggest secondary etiology such as stroke.  -ENT referral provided      FOLLOW-UP: Return in about 3 months (around 8/26/2020).  For medication management  EDUCATION AND COUNSELING:  -I personally discussed the following with the patient:   Risks/benefits/side effects/alternatives of medication including but not limited to drowsiness, sedation, dizziness, increased risk for falls, cardiovascular effects (hypotension, cardiac arrhythmias, death), weight changes, renal dysfunction, kidney stones, glaucoma, increased risk for depression, anxiety, suicide, psychosis and mood changes and avoid abrupt cessation of medication., [place] state motor vehicle saftey laws and mandatory reporting. and Diagnosis, prognosis, and treatment options discussed with patient at length.       I personally discussed the risks/benefits/alternatives of temporarily postponing non-urgent workup during the current COVID-19 pandemic.     The patient understands and agrees that due to the complexity of his/her diagnosis, results of any testing and further recommendations will typically be discussed/made during  a face to face encounter in my office. The patient and/or family further understands it is their responsibility to keep proper follow up.     Disclaimer  This dictation was created using voice recognition software. I have made every reasonable attempt to avoid dictation errors, but this document may contain an error not identified before finalizing. If the error changes the accuracy of the document, I would appreciate it being brought to my attention. Thank you very much.     Bailey Londono MD  Neurology, Neurophysiology  [facility]

## 2020-05-27 ENCOUNTER — HOSPITAL ENCOUNTER (OUTPATIENT)
Dept: LAB | Facility: MEDICAL CENTER | Age: 60
End: 2020-05-27
Attending: PSYCHIATRY & NEUROLOGY
Payer: COMMERCIAL

## 2020-05-27 LAB
ALBUMIN SERPL BCP-MCNC: 4.4 G/DL (ref 3.2–4.9)
ALBUMIN/GLOB SERPL: 1.9 G/DL
ALP SERPL-CCNC: 59 U/L (ref 30–99)
ALT SERPL-CCNC: 31 U/L (ref 2–50)
ANION GAP SERPL CALC-SCNC: 11 MMOL/L (ref 7–16)
AST SERPL-CCNC: 20 U/L (ref 12–45)
BASOPHILS # BLD AUTO: 1.1 % (ref 0–1.8)
BASOPHILS # BLD: 0.07 K/UL (ref 0–0.12)
BILIRUB SERPL-MCNC: 0.5 MG/DL (ref 0.1–1.5)
BUN SERPL-MCNC: 17 MG/DL (ref 8–22)
CALCIUM SERPL-MCNC: 8.9 MG/DL (ref 8.5–10.5)
CHLORIDE SERPL-SCNC: 108 MMOL/L (ref 96–112)
CO2 SERPL-SCNC: 23 MMOL/L (ref 20–33)
CREAT SERPL-MCNC: 0.79 MG/DL (ref 0.5–1.4)
EOSINOPHIL # BLD AUTO: 0.3 K/UL (ref 0–0.51)
EOSINOPHIL NFR BLD: 4.7 % (ref 0–6.9)
ERYTHROCYTE [DISTWIDTH] IN BLOOD BY AUTOMATED COUNT: 46.2 FL (ref 35.9–50)
GLOBULIN SER CALC-MCNC: 2.3 G/DL (ref 1.9–3.5)
GLUCOSE SERPL-MCNC: 79 MG/DL (ref 65–99)
HCT VFR BLD AUTO: 41.3 % (ref 37–47)
HGB BLD-MCNC: 13.4 G/DL (ref 12–16)
IMM GRANULOCYTES # BLD AUTO: 0.01 K/UL (ref 0–0.11)
IMM GRANULOCYTES NFR BLD AUTO: 0.2 % (ref 0–0.9)
LYMPHOCYTES # BLD AUTO: 3.43 K/UL (ref 1–4.8)
LYMPHOCYTES NFR BLD: 53.3 % (ref 22–41)
MCH RBC QN AUTO: 31.9 PG (ref 27–33)
MCHC RBC AUTO-ENTMCNC: 32.4 G/DL (ref 33.6–35)
MCV RBC AUTO: 98.3 FL (ref 81.4–97.8)
MONOCYTES # BLD AUTO: 0.43 K/UL (ref 0–0.85)
MONOCYTES NFR BLD AUTO: 6.7 % (ref 0–13.4)
NEUTROPHILS # BLD AUTO: 2.2 K/UL (ref 2–7.15)
NEUTROPHILS NFR BLD: 34 % (ref 44–72)
NRBC # BLD AUTO: 0 K/UL
NRBC BLD-RTO: 0 /100 WBC
PLATELET # BLD AUTO: 250 K/UL (ref 164–446)
PMV BLD AUTO: 12.5 FL (ref 9–12.9)
POTASSIUM SERPL-SCNC: 4 MMOL/L (ref 3.6–5.5)
PROT SERPL-MCNC: 6.7 G/DL (ref 6–8.2)
RBC # BLD AUTO: 4.2 M/UL (ref 4.2–5.4)
SODIUM SERPL-SCNC: 142 MMOL/L (ref 135–145)
WBC # BLD AUTO: 6.4 K/UL (ref 4.8–10.8)

## 2020-05-27 PROCEDURE — 36415 COLL VENOUS BLD VENIPUNCTURE: CPT

## 2020-05-27 PROCEDURE — 80053 COMPREHEN METABOLIC PANEL: CPT

## 2020-05-27 PROCEDURE — 85025 COMPLETE CBC W/AUTO DIFF WBC: CPT

## 2020-08-03 ENCOUNTER — OFFICE VISIT (OUTPATIENT)
Dept: NEUROLOGY | Facility: MEDICAL CENTER | Age: 60
End: 2020-08-03
Payer: COMMERCIAL

## 2020-08-03 VITALS
HEART RATE: 78 BPM | HEIGHT: 65 IN | RESPIRATION RATE: 16 BRPM | SYSTOLIC BLOOD PRESSURE: 128 MMHG | OXYGEN SATURATION: 97 % | BODY MASS INDEX: 30.34 KG/M2 | WEIGHT: 182.1 LBS | DIASTOLIC BLOOD PRESSURE: 82 MMHG | TEMPERATURE: 98.1 F

## 2020-08-03 DIAGNOSIS — G93.5 CHIARI MALFORMATION TYPE I (HCC): ICD-10-CM

## 2020-08-03 DIAGNOSIS — I67.1 CEREBRAL ANEURYSM WITHOUT RUPTURE: ICD-10-CM

## 2020-08-03 PROCEDURE — 99214 OFFICE O/P EST MOD 30 MIN: CPT | Performed by: PSYCHIATRY & NEUROLOGY

## 2020-08-03 ASSESSMENT — FIBROSIS 4 INDEX: FIB4 SCORE: 0.86

## 2020-08-03 ASSESSMENT — ENCOUNTER SYMPTOMS
SHORTNESS OF BREATH: 0
SORE THROAT: 0
HALLUCINATIONS: 0
FEVER: 0
DIZZINESS: 1
FALLS: 0
WEIGHT LOSS: 0

## 2020-08-03 NOTE — PROGRESS NOTES
Chief Complaint   Patient presents with   • Follow-Up     Chronic migraines      History of present illness:  Sharlene Garcia 59 y.o. female presents today for history of cerebral aneurysm without rupture, migraine, Chiari I malformation.   She is a prior patient of Dr. Cam.  History is obtained from patient.  and Patient is accompanied by self. 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     Duration/timing: see below  Context:   Migraines: Intractable headache for years, temporal region with changing locations, throbbing, photosensitivity/noise sensitivity/worse with activity, N/V, duration of at least 4 hours, frequency of headache days are daily, 15/30 migraine days.   Cerebral aneurysm: Status close coiling and monitored by Dr. Hazel, coil is MRI compatible.  October 2015.  Chiari I malformation: chronic/stable  Location: As above  Quality: As above  Severity: Moderate to severe pain migraines  Modifying factors: See below  Associated signs/symptoms: occasional snoring, used to have choking episodes and now on wedge pillow   Denies: bladder incontinence, bowel incontinence, vision changes, weakness, numbness/tingling, swallowing difficulties, speech disturbance, depression, anxiety, loss of consciousness, hallucinations, abnormal movements, diplopia, autonomic symptoms, falls and snoring/apnea during sleep     Patient has tried:  -Topiramate 100 mg twice daily; stopped in 5/2020 no refills, restarted 5/2020  -Depakote 250 mg twice daily  -Imitrex - with good benefit if taken on time, unknown dose   -Aleve/motrin - taken almost every day  -ONB - relief for 8 hours  -Botox - patient declines due to concerns of side effects  -Massage - possible benefit   -Propranolol - denies side effects (dizziness?), 20mg BID    Subjective: Patient was last seen in neurology clinic 5/2020    Migraines: Her head has been feeling more pressure lately. When she feels more pressure she gets nauseous and sick. Hard to  "function with this. Getting back on the topiramate has helped. She hasn't had to go to urgent care which likely means she is doing well according to patient. She takes one imitrex a week on average.     She overall feels like crap. Feels like her head is not clearing. She is having sharp little \"pings\" in her head moving around. She has had this pain before once in a while before. These are happening at least every other day. Duration of a second. About 3 \"pings\" a day. They are moderate in nature. Occur in the scalp region. Not reproducible. Hard to described otherwise.     Dizziness: never saw ENT.  Previously described as \"Not lightheadedness. She gets a wave of dizziness when she is upright and walking. Never happens when she is sitting. Occurs soon after she gets up. No LOC. The dizziness is described as room spinning. Tinnitus+. No clear hearing loss.  She saw ENT but has not seen them in over a year.  She was lost to follow-up due to father-in-law's death.\"      Past medical history:   Past Medical History:   Diagnosis Date   • Acquired hypothyroidism 2019   • Allergy    • Anxiety     Due to financial issues, never took medication.   • ASTHMA    • Chronic bronchitis     Including multiple episodes of pnemonia, until    • GERD (gastroesophageal reflux disease)     Doing better now with weight loss 6/10.   • Headache(784.0)    • Hyperlipidemia LDL goal < 160 10/28/2010   • Menopausal and perimenopausal disorder    • Migraine     Rare, many years ago.       Past surgical history:   Past Surgical History:   Procedure Laterality Date   • RECOVERY  10/25/2015    Procedure: [facility] RECOVERY ONLY;  Surgeon: [facility]-Rec[facility] Surgery;  Location: SURGERY [facility] ORS;  Service:    • CRISTINA BY LAPAROSCOPY  2011    Performed by LUIS ALBERTO ROBLES at SURGERY [facility] ORS   • HYSTEROSCOPY WITH VIDEO OPERATIVE  2010    Performed by PETER MORALES at SURGERY SAME DAY [facility] ORS   • PB  " DELIVERY ONLY     • PRIMARY C SECTION     • MAMMOPLASTY AUGMENTATION      And removal calcium deposits   • DC ORAL SURGERY PROCEDURE      wisdom teeth extraction   • BUNIONECTOMY  1978/1984    x2   • PB ENLARGE BREAST WITH IMPLANT         Family history:   Family History   Problem Relation Age of Onset   • Cancer Mother 53        Ovarian  at 57 from it   • Diabetes Father        Social history:   Tobacco Use   • Smoking status: Never Smoker   • Smokeless tobacco: Never Used   Substance and Sexual Activity   • Alcohol use: Yes     Alcohol/week: 0.0 - 0.5 oz     Comment: once a week   • Drug use: No   • Sexual activity: Yes     Partners: Male     Birth control/protection: Post-Menopausal     Comment: menopausal since        Current medications:   Current Outpatient Medications   Medication   • sumatriptan (IMITREX) 100 MG tablet   • topiramate (TOPAMAX) 50 MG tablet   • propranolol (INDERAL) 20 MG Tab   • divalproex ER (DEPAKOTE ER) 250 MG TABLET SR 24 HR   • promethazine (PHENERGAN) 25 MG Tab   • levothyroxine (SYNTHROID) 50 MCG Tab   • cetirizine (ZYRTEC) 10 MG Tab   • atorvastatin (LIPITOR) 20 MG Tab   • diphenhydrAMINE (BENADRYL) 25 MG Tab     No current facility-administered medications for this visit.        Medication Allergy:  Allergies   Allergen Reactions   • Nkda [No Known Drug Allergy]    • Other Environmental        Review of Systems   Constitutional: Negative for fever and weight loss.   HENT: Negative for sore throat.    Respiratory: Negative for shortness of breath.    Cardiovascular: Negative for leg swelling.   Musculoskeletal: Negative for falls.   Skin: Negative for rash.   Neurological: Positive for dizziness.        As per HPI   Psychiatric/Behavioral: Negative for hallucinations.       Physical examination:   Vitals:    20 1439   BP: 128/82   BP Location: Left arm   Pulse: 78   Resp: 16   Temp: 36.7 °C (98.1 °F)   TempSrc: Temporal   SpO2: 97%   Weight: 82.6 kg (182  "lb 1.6 oz)   Height: 1.651 m (5' 5\")     General: Patient in well nourished in no apparent distress. Elevated BMI.   Eyes: Ophthalmoscopic examination performed but discs cannot be visualized well enough to characterize bilaterally.  Prior exam  HENT: Normocephalic, atraumatic. APOORVA/JOAN non tender bilaterally  Cardiovascular: No lower extremity edema.  Respiratory: Normal respiratory effort.   Skin: No appreciable signs of acute rashes or bruising.   Musculoskeletal: No signs of joint or muscle swelling.   Psychiatric: Pleasant. Talkative.    NEUROLOGICAL EXAM:   Mental status: Awake, alert and fully oriented to person, place, time and situation. Normal attention, concentration and fund of knowledge for education level.   Speech and language: Speech is fluent without errors and clear.  Cranial nerve exam:  II: Pupils are equally round and reactive to light. Visual fields are intact by confrontation.  P  III, IV, VI: EOMI, no diplopia, no ptosis.  V: Sensation to light touch is normal over V1-3 distributions bilaterally.    VII: Facial movements are symmetrical. There is no facial droop.   VIII: Hearing intact to soft speech   IX: Dysarthria is not present.  XI: Shoulder shrug are symmetrical and strong. Prior exam  XII: Tongue protrudes midline. Prior exam     Motor exam:  Muscle tone is normal in all 4 limbs.    Muscle strength: Patient is moving all extremities equally.    Sensory exam:  Intact to Light touch in bilateral upper and lower extremity.    Deep tendon reflexes: unchanged       Right  Left  Biceps   1/4  1/4  Triceps  1/4  1/4  Brachioradialis 1/4  1/4  Knee jerk  1/4  1/4  Ankle jerk  0/4  0/4   bilateral toes are downgoing to plantar stimulation..    Coordination: shows a normal finger-nose-finger  Gait: Casual gait is normal.        ANCILLARY DATA REVIEWED:   Lab Data Review:  Lab Results   Component Value Date/Time    WBC 6.4 05/27/2020 10:39 AM    RBC 4.20 05/27/2020 10:39 AM    HEMOGLOBIN 13.4 " 05/27/2020 10:39 AM    HEMATOCRIT 41.3 05/27/2020 10:39 AM    MCV 98.3 (H) 05/27/2020 10:39 AM    MCH 31.9 05/27/2020 10:39 AM    MCHC 32.4 (L) 05/27/2020 10:39 AM    MPV 12.5 05/27/2020 10:39 AM    NEUTSPOLYS 34.00 (L) 05/27/2020 10:39 AM    LYMPHOCYTES 53.30 (H) 05/27/2020 10:39 AM    MONOCYTES 6.70 05/27/2020 10:39 AM    EOSINOPHILS 4.70 05/27/2020 10:39 AM    BASOPHILS 1.10 05/27/2020 10:39 AM    HYPOCHROMIA 1+ 03/26/2010 10:07 AM      Lab Results   Component Value Date/Time    SODIUM 142 05/27/2020 10:39 AM    POTASSIUM 4.0 05/27/2020 10:39 AM    CHLORIDE 108 05/27/2020 10:39 AM    CO2 23 05/27/2020 10:39 AM    GLUCOSE 79 05/27/2020 10:39 AM    BUN 17 05/27/2020 10:39 AM    CREATININE 0.79 05/27/2020 10:39 AM     Lab Results   Component Value Date/Time    ASTSGOT 17 09/21/2017 0352    ALTSGPT 49 09/21/2017 0352    ALKPHOSPHAT 80 09/21/2017 0352    ALBUMIN 3.5 09/21/2017 0352     Lab Results   Component Value Date/Time    HBA1C 5.6 10/25/2015 03:12 PM      EEG routine June 2018 interpreted by Dr. Cam for feeling weird with a headache: Cortical dysfunction over the left    Imaging:   MRA 2019 August head:  Stable 4 mm bilobed residual neck of previously coiled left carotid terminus aneurysm.    Records reviewed: None      ASSESSMENT AND PLAN:  1. Chronic intractable migraine without aura or status migrainosus: 15+ migraine days a month.  Originally there was a consideration of medication overuse component as well.  Do not think there is a contribution from her cerebral aneurysm given duration of her migraines.  Prior MRI brain without contrast September 2018 with Chiari I malformation (suspect incidental) with mild white matter changes but otherwise no concerning findings.  She is menopausal.  -Continue propranolol, Depakote, topiramate preventative therapy  -Continue Imitrex as needed for abortive therapy  -Consider CGRP antagonists if severe worsening of her migraines  -CBC, CMP every 6 months with Depakote  "level, next in winter 2020  -Consider MRI C-spine if more concerning cervicogenic qualities    2. Cerebral aneurysm without rupture: History of.  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  Her coil placed by Dr. Rey is MRI compatible.  -Repeat MRA of the head  -Consult placed to neuroIR for monitoring   -Blood pressure control, will defer to PCP    3. Chiari malformation type I (HCC): No signs of increased intracranial pressure/idiopathic intracranial hypertension, likely incidental.  She does not have a Valsalva type headaches.  She is on topiramate.  -Monitor    4. Cognitive fog, new: Depakote and ammonia level ordered, consider L carnitine if ammonia elevated. Previously slightly elevated in 2017    5. Dizziness, persistent: Patient describes it as room spinning, vertigo which she has had in the past.  In the setting of tinnitus.  Due to the worsening and uncertain diagnosis by ENT I think is reasonable to refer turned back to them for evaluation.  Differential does include Ménière's disease.  Denies any exacerbation with turning her head or trouble with driving.  No focal exam findings to suggest secondary etiology such as stroke.  -Recommend follow up with ENT    6. Abnormal sensations, new: Patient developing \"pains\" in her head in random places.  Not entirely consistent with nummular headaches or per primary stabbing headaches.  We discussed management options including but not limited to medications versus clinical monitoring-opted for clinical monitoring.  No physical exam findings suggest more concerning etiology.    -Consider repeat MRI of the head      FOLLOW-UP: Return in about 3 months (around 11/3/2020).  For reevaluation of her abnormal sensation  EDUCATION AND COUNSELING:  -I personally discussed the following with the patient:   Medical reasoning as above reason for consultation and further " management of her cerebral aneurysm     The patient understands and agrees that due to the complexity of his/her diagnosis, results of any testing and further recommendations will typically be discussed/made during a face to face encounter in my office. The patient and/or family further understands it is their responsibility to keep proper follow up.     Disclaimer  This dictation was created using voice recognition software. I have made every reasonable attempt to avoid dictation errors, but this document may contain an error not identified before finalizing. If the error changes the accuracy of the document, I would appreciate it being brought to my attention. Thank you very much.     Bailey Londono MD  Neurology, Neurophysiology  [facility]

## 2020-08-04 ENCOUNTER — TELEPHONE (OUTPATIENT)
Dept: NEUROLOGY | Facility: MEDICAL CENTER | Age: 60
End: 2020-08-04

## 2020-08-04 DIAGNOSIS — F40.240 CLAUSTROPHOBIA: ICD-10-CM

## 2020-08-04 RX ORDER — ALPRAZOLAM 0.5 MG/1
0.5 TABLET ORAL
Qty: 2 TAB | Refills: 0 | Status: SHIPPED | OUTPATIENT
Start: 2020-08-04 | End: 2020-11-04

## 2020-08-04 NOTE — TELEPHONE ENCOUNTER
----- Message from Bailey Londono M.D. sent at 8/4/2020 10:24 AM PDT -----  Can you please let the patient know that I ordered a consult to Dr. Rey?  I want him to comment and follow her for her cerebral aneurysms since there is residual after his coil.  She may want to hold off on the MRA of the head until seeing him in case he wants a different type of study.  Please let me know if she has any questions and document she communicated understanding.

## 2020-08-04 NOTE — TELEPHONE ENCOUNTER
Patient verbally understood . Patient is concerned for her MRA . Patient would like to know if she can be prescribed xanax for the image she needs to get done ?    Patient has taken the medication before to help her relax for any type of imaging that she had done in the past . Patient is requesting two pills to be ordered if possible . Per patient she will have her  drive her to the scheduled appointment .

## 2020-08-05 ENCOUNTER — HOSPITAL ENCOUNTER (OUTPATIENT)
Dept: LAB | Facility: MEDICAL CENTER | Age: 60
End: 2020-08-05
Attending: PSYCHIATRY & NEUROLOGY
Payer: COMMERCIAL

## 2020-08-05 DIAGNOSIS — G93.5 CHIARI MALFORMATION TYPE I (HCC): ICD-10-CM

## 2020-08-05 DIAGNOSIS — N95.9 MENOPAUSAL AND PERIMENOPAUSAL DISORDER: ICD-10-CM

## 2020-08-05 DIAGNOSIS — I67.1 CEREBRAL ANEURYSM WITHOUT RUPTURE: ICD-10-CM

## 2020-08-05 LAB
ALBUMIN SERPL BCP-MCNC: 4.5 G/DL (ref 3.2–4.9)
ALBUMIN/GLOB SERPL: 1.9 G/DL
ALP SERPL-CCNC: 71 U/L (ref 30–99)
ALT SERPL-CCNC: 32 U/L (ref 2–50)
AMMONIA PLAS-SCNC: 29 UMOL/L (ref 11–45)
ANION GAP SERPL CALC-SCNC: 13 MMOL/L (ref 7–16)
AST SERPL-CCNC: 20 U/L (ref 12–45)
BASOPHILS # BLD AUTO: 1 % (ref 0–1.8)
BASOPHILS # BLD: 0.07 K/UL (ref 0–0.12)
BILIRUB SERPL-MCNC: 0.3 MG/DL (ref 0.1–1.5)
BUN SERPL-MCNC: 15 MG/DL (ref 8–22)
CALCIUM SERPL-MCNC: 9.4 MG/DL (ref 8.5–10.5)
CHLORIDE SERPL-SCNC: 108 MMOL/L (ref 96–112)
CO2 SERPL-SCNC: 23 MMOL/L (ref 20–33)
CREAT SERPL-MCNC: 0.84 MG/DL (ref 0.5–1.4)
EOSINOPHIL # BLD AUTO: 0.2 K/UL (ref 0–0.51)
EOSINOPHIL NFR BLD: 3 % (ref 0–6.9)
ERYTHROCYTE [DISTWIDTH] IN BLOOD BY AUTOMATED COUNT: 45.7 FL (ref 35.9–50)
GLOBULIN SER CALC-MCNC: 2.4 G/DL (ref 1.9–3.5)
GLUCOSE SERPL-MCNC: 96 MG/DL (ref 65–99)
HCT VFR BLD AUTO: 43.1 % (ref 37–47)
HGB BLD-MCNC: 13.7 G/DL (ref 12–16)
IMM GRANULOCYTES # BLD AUTO: 0.01 K/UL (ref 0–0.11)
IMM GRANULOCYTES NFR BLD AUTO: 0.1 % (ref 0–0.9)
LYMPHOCYTES # BLD AUTO: 3.7 K/UL (ref 1–4.8)
LYMPHOCYTES NFR BLD: 55.5 % (ref 22–41)
MCH RBC QN AUTO: 31.9 PG (ref 27–33)
MCHC RBC AUTO-ENTMCNC: 31.8 G/DL (ref 33.6–35)
MCV RBC AUTO: 100.2 FL (ref 81.4–97.8)
MONOCYTES # BLD AUTO: 0.41 K/UL (ref 0–0.85)
MONOCYTES NFR BLD AUTO: 6.1 % (ref 0–13.4)
NEUTROPHILS # BLD AUTO: 2.28 K/UL (ref 2–7.15)
NEUTROPHILS NFR BLD: 34.3 % (ref 44–72)
NRBC # BLD AUTO: 0 K/UL
NRBC BLD-RTO: 0 /100 WBC
PLATELET # BLD AUTO: 247 K/UL (ref 164–446)
PMV BLD AUTO: 13 FL (ref 9–12.9)
POTASSIUM SERPL-SCNC: 4.4 MMOL/L (ref 3.6–5.5)
PROT SERPL-MCNC: 6.9 G/DL (ref 6–8.2)
RBC # BLD AUTO: 4.3 M/UL (ref 4.2–5.4)
SODIUM SERPL-SCNC: 144 MMOL/L (ref 135–145)
VALPROATE SERPL-MCNC: 39.7 UG/ML (ref 50–100)
WBC # BLD AUTO: 6.7 K/UL (ref 4.8–10.8)

## 2020-08-05 PROCEDURE — 80164 ASSAY DIPROPYLACETIC ACD TOT: CPT

## 2020-08-05 PROCEDURE — 85025 COMPLETE CBC W/AUTO DIFF WBC: CPT

## 2020-08-05 PROCEDURE — 36415 COLL VENOUS BLD VENIPUNCTURE: CPT

## 2020-08-05 PROCEDURE — 80053 COMPREHEN METABOLIC PANEL: CPT

## 2020-08-05 PROCEDURE — 82140 ASSAY OF AMMONIA: CPT

## 2020-08-05 NOTE — TELEPHONE ENCOUNTER
Called [facility] pharmacy spoke with pharmacist medication was called in . She verbally understood the directions for medication . Patient is aware medication was called in .

## 2020-08-06 ENCOUNTER — PATIENT MESSAGE (OUTPATIENT)
Dept: NEUROLOGY | Facility: MEDICAL CENTER | Age: 60
End: 2020-08-06

## 2020-08-06 ENCOUNTER — HOSPITAL ENCOUNTER (OUTPATIENT)
Dept: LAB | Facility: MEDICAL CENTER | Age: 60
End: 2020-08-06
Attending: PSYCHIATRY & NEUROLOGY
Payer: COMMERCIAL

## 2020-08-06 DIAGNOSIS — D75.89 MACROCYTOSIS WITHOUT ANEMIA: ICD-10-CM

## 2020-08-06 LAB
FOLATE SERPL-MCNC: 15.1 NG/ML
VIT B12 SERPL-MCNC: 533 PG/ML (ref 211–911)

## 2020-08-06 PROCEDURE — 82746 ASSAY OF FOLIC ACID SERUM: CPT

## 2020-08-06 PROCEDURE — 83921 ORGANIC ACID SINGLE QUANT: CPT

## 2020-08-06 PROCEDURE — 83090 ASSAY OF HOMOCYSTEINE: CPT

## 2020-08-06 PROCEDURE — 82607 VITAMIN B-12: CPT

## 2020-08-06 PROCEDURE — 36415 COLL VENOUS BLD VENIPUNCTURE: CPT

## 2020-08-06 NOTE — TELEPHONE ENCOUNTER
----- Message from Bailey Londono M.D. sent at 8/6/2020  1:13 PM PDT -----  Please mail patient's lab order to her.  Let her know I want her to get the labs done to follow-up on her red blood cells being large.    B12, MMA, folate, homocystine

## 2020-08-06 NOTE — PATIENT COMMUNICATION
Patient verbally understood she will go back to the lab , patient does not want the labs sent she will just go to the lab .     Patient wanted to let you know she has her MRA scheduled on 08/15/2020

## 2020-08-06 NOTE — PROGRESS NOTES
Microcytosis without anemia.  Progressively worsening.  Will order basic studies.  May need to referral to hemetology.

## 2020-08-07 LAB — HCYS SERPL-SCNC: 10.91 UMOL/L

## 2020-08-09 LAB — METHYLMALONATE SERPL-SCNC: 0.13 UMOL/L (ref 0–0.4)

## 2020-08-15 ENCOUNTER — HOSPITAL ENCOUNTER (OUTPATIENT)
Dept: RADIOLOGY | Facility: MEDICAL CENTER | Age: 60
End: 2020-08-15
Attending: PSYCHIATRY & NEUROLOGY
Payer: COMMERCIAL

## 2020-08-15 DIAGNOSIS — I67.1 CEREBRAL ANEURYSM WITHOUT RUPTURE: ICD-10-CM

## 2020-08-15 PROCEDURE — 70544 MR ANGIOGRAPHY HEAD W/O DYE: CPT | Mod: MG

## 2020-09-08 RX ORDER — SUMATRIPTAN 100 MG/1
TABLET, FILM COATED ORAL
Qty: 8 TAB | Refills: 3 | Status: SHIPPED | OUTPATIENT
Start: 2020-09-08

## 2020-09-08 NOTE — TELEPHONE ENCOUNTER
Received request via: Pharmacy    Was the patient seen in the last year in this department? Yes    Does the patient have an active prescription (recently filled or refills available) for medication(s) requested? No     Patient was last seen on 08/03/2020 , medication last filled on 05/26/2020

## 2021-01-05 ENCOUNTER — TELEPHONE (OUTPATIENT)
Dept: NEUROLOGY | Facility: MEDICAL CENTER | Age: 61
End: 2021-01-05

## 2021-01-05 NOTE — TELEPHONE ENCOUNTER
I returned a call from [facility] pharmacy with the phone number left on the voice mail. The on the voice mail it said he had a question about drug interaction but the represnetative I spoke to said he didn't find any note on the pt file about any question.

## 2021-03-15 DIAGNOSIS — Z23 NEED FOR VACCINATION: ICD-10-CM

## 2021-03-26 ENCOUNTER — TELEPHONE (OUTPATIENT)
Dept: NEUROLOGY | Facility: MEDICAL CENTER | Age: 61
End: 2021-03-26

## 2021-03-26 NOTE — TELEPHONE ENCOUNTER
The pt called and stated she is moving to [place] and she is asking if Dr Londono has any Neurologist she recommends over there.

## 2021-03-29 NOTE — TELEPHONE ENCOUNTER
Dr. Silverio Ellis is a movement disorders specialist and may be seeing general neurology if she would like to look into it.

## 2021-03-30 ENCOUNTER — TELEPHONE (OUTPATIENT)
Dept: NEUROLOGY | Facility: MEDICAL CENTER | Age: 61
End: 2021-03-30

## 2021-03-30 NOTE — TELEPHONE ENCOUNTER
The pt called and LVM stating she moved to [place] and for us to send the prescriptions to over there. But she didn't leave the name of the medications or the pharmacy. I called her back with the number she left ( it is the same with the one on the chart.) but it goes directly to the voice mail. I left a message asking the meds name and the pharmacy she wants us to send too. I left my direct line.

## 2021-07-26 RX ORDER — PROPRANOLOL HYDROCHLORIDE 20 MG/1
TABLET ORAL
Qty: 180 TABLET | Refills: 0 | Status: SHIPPED | OUTPATIENT
Start: 2021-07-26

## 2021-07-26 RX ORDER — DIVALPROEX SODIUM 250 MG/1
TABLET, EXTENDED RELEASE ORAL
Qty: 180 TABLET | Refills: 0 | Status: SHIPPED | OUTPATIENT
Start: 2021-07-26

## 2021-07-26 NOTE — TELEPHONE ENCOUNTER
Received request via: Pharmacy    Was the patient seen in the last year in this department? Yes 8/3/20- Patient never scheduled follow up. It looks like patient was moving to [place], I LVM to confirm with patient.    Does the patient have an active prescription (recently filled or refills available) for medication(s) requested?   Yes.

## 2022-06-23 NOTE — TELEPHONE ENCOUNTER
sumatriptan (IMITREX) 100 MG tablet (Discontinued) 10 Tab 3/3 8/31/2016 9/18/2017    Sig - Route: Take 1 Tab by mouth Once PRN for Migraine for up to 1 dose. - Oral      ibuprofen (MOTRIN) tablet 800 mg (Discontinued)   9/17/2017 9/18/2017    Sig - Route: Take 4 Tabs by mouth 3 times a day as needed for Mild Pain. - Oral       Patient sent a [employer] message asking for refills of these two  Medications. Previously prescribed by Dr. Koch.                Was the patient seen in the last year in this department? Yes  4/12/18    Does patient have an active prescription for medications requested? Yes     Received Request Via: Pharmacy    Next scheduled follow up appointment: 9/7/18  
35

## 2023-06-23 NOTE — TELEPHONE ENCOUNTER
LVM to inform  
Limited refill provided, patient relocated to [place].  
Received request via: Pharmacy    Was the patient seen in the last year in this department? Yes 8-3-20    Does the patient have an active prescription (recently filled or refills available) for medication(s) requested? No  
0 = swallows foods/liquids without difficulty